# Patient Record
Sex: FEMALE | Race: WHITE | NOT HISPANIC OR LATINO | URBAN - METROPOLITAN AREA
[De-identification: names, ages, dates, MRNs, and addresses within clinical notes are randomized per-mention and may not be internally consistent; named-entity substitution may affect disease eponyms.]

---

## 2019-03-29 ENCOUNTER — INPATIENT (INPATIENT)
Facility: HOSPITAL | Age: 64
LOS: 0 days | Discharge: HOME | End: 2019-03-29
Attending: STUDENT IN AN ORGANIZED HEALTH CARE EDUCATION/TRAINING PROGRAM | Admitting: STUDENT IN AN ORGANIZED HEALTH CARE EDUCATION/TRAINING PROGRAM

## 2019-03-29 ENCOUNTER — TRANSCRIPTION ENCOUNTER (OUTPATIENT)
Age: 64
End: 2019-03-29

## 2019-03-29 VITALS
SYSTOLIC BLOOD PRESSURE: 152 MMHG | RESPIRATION RATE: 16 BRPM | TEMPERATURE: 97 F | HEART RATE: 60 BPM | DIASTOLIC BLOOD PRESSURE: 87 MMHG | OXYGEN SATURATION: 95 %

## 2019-03-29 VITALS
HEART RATE: 92 BPM | OXYGEN SATURATION: 100 % | SYSTOLIC BLOOD PRESSURE: 169 MMHG | RESPIRATION RATE: 20 BRPM | HEIGHT: 62 IN | WEIGHT: 132.06 LBS | TEMPERATURE: 96 F | DIASTOLIC BLOOD PRESSURE: 89 MMHG

## 2019-03-29 DIAGNOSIS — E87.6 HYPOKALEMIA: ICD-10-CM

## 2019-03-29 DIAGNOSIS — R07.9 CHEST PAIN, UNSPECIFIED: ICD-10-CM

## 2019-03-29 DIAGNOSIS — E78.00 PURE HYPERCHOLESTEROLEMIA, UNSPECIFIED: ICD-10-CM

## 2019-03-29 DIAGNOSIS — I10 ESSENTIAL (PRIMARY) HYPERTENSION: ICD-10-CM

## 2019-03-29 LAB
ALBUMIN SERPL ELPH-MCNC: 4.7 G/DL — SIGNIFICANT CHANGE UP (ref 3.5–5.2)
ALBUMIN SERPL ELPH-MCNC: 4.8 G/DL — SIGNIFICANT CHANGE UP (ref 3.5–5.2)
ALP SERPL-CCNC: 73 U/L — SIGNIFICANT CHANGE UP (ref 30–115)
ALP SERPL-CCNC: 77 U/L — SIGNIFICANT CHANGE UP (ref 30–115)
ALT FLD-CCNC: 11 U/L — SIGNIFICANT CHANGE UP (ref 0–41)
ALT FLD-CCNC: 11 U/L — SIGNIFICANT CHANGE UP (ref 0–41)
ANION GAP SERPL CALC-SCNC: 14 MMOL/L — SIGNIFICANT CHANGE UP (ref 7–14)
ANION GAP SERPL CALC-SCNC: 14 MMOL/L — SIGNIFICANT CHANGE UP (ref 7–14)
AST SERPL-CCNC: 20 U/L — SIGNIFICANT CHANGE UP (ref 0–41)
AST SERPL-CCNC: 21 U/L — SIGNIFICANT CHANGE UP (ref 0–41)
BILIRUB SERPL-MCNC: 0.4 MG/DL — SIGNIFICANT CHANGE UP (ref 0.2–1.2)
BILIRUB SERPL-MCNC: 0.4 MG/DL — SIGNIFICANT CHANGE UP (ref 0.2–1.2)
BUN SERPL-MCNC: 11 MG/DL — SIGNIFICANT CHANGE UP (ref 10–20)
BUN SERPL-MCNC: 8 MG/DL — LOW (ref 10–20)
CALCIUM SERPL-MCNC: 9.4 MG/DL — SIGNIFICANT CHANGE UP (ref 8.5–10.1)
CALCIUM SERPL-MCNC: 9.4 MG/DL — SIGNIFICANT CHANGE UP (ref 8.5–10.1)
CHLORIDE SERPL-SCNC: 104 MMOL/L — SIGNIFICANT CHANGE UP (ref 98–110)
CHLORIDE SERPL-SCNC: 105 MMOL/L — SIGNIFICANT CHANGE UP (ref 98–110)
CK MB CFR SERPL CALC: 1.8 NG/ML — SIGNIFICANT CHANGE UP (ref 0.6–6.3)
CK SERPL-CCNC: 113 U/L — SIGNIFICANT CHANGE UP (ref 0–225)
CO2 SERPL-SCNC: 23 MMOL/L — SIGNIFICANT CHANGE UP (ref 17–32)
CO2 SERPL-SCNC: 24 MMOL/L — SIGNIFICANT CHANGE UP (ref 17–32)
CREAT SERPL-MCNC: 0.5 MG/DL — LOW (ref 0.7–1.5)
CREAT SERPL-MCNC: 0.6 MG/DL — LOW (ref 0.7–1.5)
GLUCOSE SERPL-MCNC: 107 MG/DL — HIGH (ref 70–99)
GLUCOSE SERPL-MCNC: 113 MG/DL — HIGH (ref 70–99)
HCT VFR BLD CALC: 37 % — SIGNIFICANT CHANGE UP (ref 37–47)
HCT VFR BLD CALC: 38.6 % — SIGNIFICANT CHANGE UP (ref 37–47)
HCV AB S/CO SERPL IA: 0.54 S/CO — SIGNIFICANT CHANGE UP (ref 0–0.79)
HCV AB SERPL-IMP: SIGNIFICANT CHANGE UP
HGB BLD-MCNC: 12.3 G/DL — SIGNIFICANT CHANGE UP (ref 12–16)
HGB BLD-MCNC: 12.7 G/DL — SIGNIFICANT CHANGE UP (ref 12–16)
MCHC RBC-ENTMCNC: 29.1 PG — SIGNIFICANT CHANGE UP (ref 27–31)
MCHC RBC-ENTMCNC: 29.2 PG — SIGNIFICANT CHANGE UP (ref 27–31)
MCHC RBC-ENTMCNC: 32.9 G/DL — SIGNIFICANT CHANGE UP (ref 32–37)
MCHC RBC-ENTMCNC: 33.2 G/DL — SIGNIFICANT CHANGE UP (ref 32–37)
MCV RBC AUTO: 87.9 FL — SIGNIFICANT CHANGE UP (ref 81–99)
MCV RBC AUTO: 88.3 FL — SIGNIFICANT CHANGE UP (ref 81–99)
NRBC # BLD: 0 /100 WBCS — SIGNIFICANT CHANGE UP (ref 0–0)
NRBC # BLD: 0 /100 WBCS — SIGNIFICANT CHANGE UP (ref 0–0)
PLATELET # BLD AUTO: 214 K/UL — SIGNIFICANT CHANGE UP (ref 130–400)
PLATELET # BLD AUTO: 235 K/UL — SIGNIFICANT CHANGE UP (ref 130–400)
POTASSIUM SERPL-MCNC: 3.4 MMOL/L — LOW (ref 3.5–5)
POTASSIUM SERPL-MCNC: 3.9 MMOL/L — SIGNIFICANT CHANGE UP (ref 3.5–5)
POTASSIUM SERPL-SCNC: 3.4 MMOL/L — LOW (ref 3.5–5)
POTASSIUM SERPL-SCNC: 3.9 MMOL/L — SIGNIFICANT CHANGE UP (ref 3.5–5)
PROT SERPL-MCNC: 7.1 G/DL — SIGNIFICANT CHANGE UP (ref 6–8)
PROT SERPL-MCNC: 7.1 G/DL — SIGNIFICANT CHANGE UP (ref 6–8)
RBC # BLD: 4.21 M/UL — SIGNIFICANT CHANGE UP (ref 4.2–5.4)
RBC # BLD: 4.37 M/UL — SIGNIFICANT CHANGE UP (ref 4.2–5.4)
RBC # FLD: 14.5 % — SIGNIFICANT CHANGE UP (ref 11.5–14.5)
RBC # FLD: 14.6 % — HIGH (ref 11.5–14.5)
SODIUM SERPL-SCNC: 142 MMOL/L — SIGNIFICANT CHANGE UP (ref 135–146)
SODIUM SERPL-SCNC: 142 MMOL/L — SIGNIFICANT CHANGE UP (ref 135–146)
TROPONIN T SERPL-MCNC: <0.01 NG/ML — SIGNIFICANT CHANGE UP
TROPONIN T SERPL-MCNC: <0.01 NG/ML — SIGNIFICANT CHANGE UP
WBC # BLD: 6.57 K/UL — SIGNIFICANT CHANGE UP (ref 4.8–10.8)
WBC # BLD: 7.49 K/UL — SIGNIFICANT CHANGE UP (ref 4.8–10.8)
WBC # FLD AUTO: 6.57 K/UL — SIGNIFICANT CHANGE UP (ref 4.8–10.8)
WBC # FLD AUTO: 7.49 K/UL — SIGNIFICANT CHANGE UP (ref 4.8–10.8)

## 2019-03-29 RX ORDER — ACETAMINOPHEN 500 MG
2 TABLET ORAL
Qty: 0 | Refills: 0 | DISCHARGE
Start: 2019-03-29

## 2019-03-29 RX ORDER — AMLODIPINE BESYLATE 2.5 MG/1
5 TABLET ORAL DAILY
Qty: 0 | Refills: 0 | Status: DISCONTINUED | OUTPATIENT
Start: 2019-03-30 | End: 2019-03-29

## 2019-03-29 RX ORDER — ASPIRIN/CALCIUM CARB/MAGNESIUM 324 MG
81 TABLET ORAL DAILY
Qty: 0 | Refills: 0 | Status: DISCONTINUED | OUTPATIENT
Start: 2019-03-29 | End: 2019-03-29

## 2019-03-29 RX ORDER — ACETAMINOPHEN 500 MG
650 TABLET ORAL EVERY 6 HOURS
Qty: 0 | Refills: 0 | Status: DISCONTINUED | OUTPATIENT
Start: 2019-03-29 | End: 2019-03-29

## 2019-03-29 RX ORDER — CHLORHEXIDINE GLUCONATE 213 G/1000ML
1 SOLUTION TOPICAL
Qty: 0 | Refills: 0 | Status: DISCONTINUED | OUTPATIENT
Start: 2019-03-29 | End: 2019-03-29

## 2019-03-29 RX ORDER — NICOTINE POLACRILEX 2 MG
1 GUM BUCCAL DAILY
Qty: 0 | Refills: 0 | Status: DISCONTINUED | OUTPATIENT
Start: 2019-03-29 | End: 2019-03-29

## 2019-03-29 RX ORDER — NICOTINE POLACRILEX 2 MG
1 GUM BUCCAL
Qty: 0 | Refills: 0 | DISCHARGE
Start: 2019-03-29

## 2019-03-29 RX ORDER — ASPIRIN/CALCIUM CARB/MAGNESIUM 324 MG
1 TABLET ORAL
Qty: 0 | Refills: 0 | DISCHARGE
Start: 2019-03-29

## 2019-03-29 RX ADMIN — Medication 1 PATCH: at 10:39

## 2019-03-29 RX ADMIN — Medication 81 MILLIGRAM(S): at 03:17

## 2019-03-29 NOTE — H&P ADULT - PROBLEM SELECTOR PLAN 1
Also strongly suggestive of muscular skeletal disease, very concerned about risk factors. Serial cardiac enzymes and ask cardiology to see

## 2019-03-29 NOTE — DISCHARGE NOTE PROVIDER - CARE PROVIDER_API CALL
Micha Sharma (MD)  Internal Medicine  [address] [place] [address]  Phone: [phone]  Fax: [phone]  Follow Up Time:     Quirino Mehta)  Cardiovascular Disease; Interventional Cardiology  [address] [place] [address]  Phone: [phone]  Fax: [phone]  Follow Up Time:

## 2019-03-29 NOTE — H&P ADULT - NSHPLABSRESULTS_GEN_ALL_CORE
EKG -  to me nsr with possible junctional ST depression (ER called it normal EKG)                          12.3   7.49  )-----------( 214      ( 29 Mar 2019 01:30 )             37.0     03-29    142  |  104  |  11  ----------------------------<  113<H>  3.4<L>   |  24  |  0.6<L>    Ca    9.4      29 Mar 2019 01:30    TPro  7.1  /  Alb  4.8  /  TBili  0.4  /  DBili  x   /  AST  21  /  ALT  11  /  AlkPhos  73  03-29              Lactate Trend    CARDIAC MARKERS ( 29 Mar 2019 01:30 )  x     / <0.01 ng/mL / x     / x     / x          CAPILLARY BLOOD GLUCOSE    CXR per discussion with ER is normal

## 2019-03-29 NOTE — CONSULT NOTE ADULT - SUBJECTIVE AND OBJECTIVE BOX
CARDIOLOGY CONSULT NOTE     CHIEF COMPLAINT/REASON FOR CONSULT:    HPI:  65yo female presents to the ER with chest pains for 3 days. Location is left with travel to middle and also right sided with pain level reaching 6/10. Notes that she actively lifts weights for exercise. Family history includes mother with heart disease and patient does use tobacco (but told ER she is a former smoker) (29 Mar 2019 04:53)      PAST MEDICAL & SURGICAL HISTORY:  Hypertension  High blood cholesterol      Cardiac Risks:   [x ]HTN, [ ] DM, [x ] Smoking, [x ] FH,  [ ] Lipids        MEDICATIONS:  MEDICATIONS  (STANDING):  aspirin  chewable 81 milliGRAM(s) Oral daily  chlorhexidine 4% Liquid 1 Application(s) Topical <User Schedule>      FAMILY HISTORY:      SOCIAL HISTORY:      [ ] Marital status   Allergies    No Known Allergies          	    REVIEW OF SYSTEMS:  CONSTITUTIONAL: No fever, weight loss, or fatigue  EYES: No eye pain, visual disturbances, or discharge  ENMT:  No difficulty hearing, tinnitus, vertigo; No sinus or throat pain  NECK: No pain or stiffness  RESPIRATORY: No cough, wheezing, chills or hemoptysis; No Shortness of Breath  CARDIOVASCULAR: see above  GASTROINTESTINAL: No abdominal or epigastric pain. No nausea, vomiting, or hematemesis; No diarrhea or constipation. No melena or hematochezia.  GENITOURINARY: No dysuria, frequency, hematuria, or incontinence  NEUROLOGICAL: No headaches, memory loss, loss of strength, numbness, or tremors  SKIN: No itching, burning, rashes, or lesions   	    [ ] All others negative	  [ ] Unable to obtain    PHYSICAL EXAM:  T(C): 35.8 (03-29-19 @ 00:59), Max: 35.8 (03-29-19 @ 00:59)  HR: 92 (03-29-19 @ 00:59) (92 - 92)  BP: 169/89 (03-29-19 @ 00:59) (169/89 - 169/89)  RR: 20 (03-29-19 @ 00:59) (20 - 20)  SpO2: 100% (03-29-19 @ 00:59) (100% - 100%)  Wt(kg): --  I&O's Summary      Appearance: Normal	  Psychiatry: A & O x 3, Mood & affect appropriate  HEENT:   Normal oral mucosa, PERRL, EOMI	  Lymphatic: No lymphadenopathy  Cardiovascular: Normal S1 S2,RRR, No JVD, No murmurs  Respiratory: Lungs clear to auscultation	  Gastrointestinal:  Soft, Non-tender, + BS	  Skin: No rashes, No ecchymoses, No cyanosis	  Neurologic: Non-focal  Extremities: Normal range of motion, No clubbing, cyanosis or edema  Vascular: Peripheral pulses palpable 2+ bilaterally      ECG:  	nsr nsst    	  LABS:	 	    CARDIAC MARKERS:                                    12.3   7.49  )-----------( 214      ( 29 Mar 2019 01:30 )             37.0     03-29    142  |  104  |  11  ----------------------------<  113<H>  3.4<L>   |  24  |  0.6<L>    Ca    9.4      29 Mar 2019 01:30    TPro  7.1  /  Alb  4.8  /  TBili  0.4  /  DBili  x   /  AST  21  /  ALT  11  /  AlkPhos  73  03-29

## 2019-03-29 NOTE — CONSULT NOTE ADULT - ASSESSMENT
Patient 3 days moving chest pain. Not exertional. Pain worse if move sternum. Sternum tender palpation. Neg stress she claims aug. Would r/o mi. F/U Dr galarza
no

## 2019-03-29 NOTE — DISCHARGE NOTE NURSING/CASE MANAGEMENT/SOCIAL WORK - NSDCDPATPORTLINK_GEN_ALL_CORE
You can access the [facility] Patient Portal, offered by [facility], by registering with the following website: [email or web address]

## 2019-03-29 NOTE — ED ADULT NURSE NOTE - NSIMPLEMENTINTERV_GEN_ALL_ED
Implemented All Universal Safety Interventions:  [place] to call system. Call bell, personal items and telephone within reach. Instruct patient to call for assistance. Room bathroom lighting operational. Non-slip footwear when patient is off stretcher. Physically safe environment: no spills, clutter or unnecessary equipment. Stretcher in lowest position, wheels locked, appropriate side rails in place.

## 2019-03-29 NOTE — H&P ADULT - HISTORY OF PRESENT ILLNESS
65yo female presents to the ER with chest pains for 3 days. Location is left with travel to middle and also right sided. Notes that she actively lifts weights for exercise. Family history includes mother with heart disease and patient does use tobacco 63yo female presents to the ER with chest pains for 3 days. Location is left with travel to middle and also right sided with pain level reaching 6/10. Notes that she actively lifts weights for exercise. Family history includes mother with heart disease and patient does use tobacco 63yo female presents to the ER with chest pains for 3 days. Location is left with travel to middle and also right sided with pain level reaching 6/10. Notes that she actively lifts weights for exercise. Family history includes mother with heart disease and patient does use tobacco (but told ER she is a former smoker)

## 2019-03-29 NOTE — DISCHARGE NOTE PROVIDER - HOSPITAL COURSE
65yo female presents to the ER with chest pains for 3 days likely atypical after lifting heavy weights during exercise. Admitted to telemonitoring with 2 sets of CE being negative, and Normal Echo, ruled out ACS. Cardio evaluated and suggest it to be atypical chest pain likely musculoskeltal and fu with cardiology as OP.         patient clinically stable for discharge home today and advised to avid heavy weight lifting for a week.

## 2019-03-29 NOTE — DISCHARGE NOTE PROVIDER - NSDCCPCAREPLAN_GEN_ALL_CORE_FT
PRINCIPAL DISCHARGE DIAGNOSIS  Diagnosis: Chest pain  Assessment and Plan of Treatment: atypical chest pain. analgesic PRN. avoid heavy lifting. fu Cardio OP      SECONDARY DISCHARGE DIAGNOSES  Diagnosis: Hypokalemia  Assessment and Plan of Treatment: improved

## 2019-04-02 DIAGNOSIS — E87.6 HYPOKALEMIA: ICD-10-CM

## 2019-04-02 DIAGNOSIS — I10 ESSENTIAL (PRIMARY) HYPERTENSION: ICD-10-CM

## 2019-04-02 DIAGNOSIS — R07.89 OTHER CHEST PAIN: ICD-10-CM

## 2019-04-02 DIAGNOSIS — R07.9 CHEST PAIN, UNSPECIFIED: ICD-10-CM

## 2019-04-02 DIAGNOSIS — E78.00 PURE HYPERCHOLESTEROLEMIA, UNSPECIFIED: ICD-10-CM

## 2019-06-04 NOTE — ED ADULT TRIAGE NOTE - BP NONINVASIVE SYSTOLIC (MM HG)
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

## 2021-04-20 NOTE — ED PROVIDER NOTE - RESPIRATORY NEGATIVE STATEMENT, MLM
Called patient to schedule a screening mammogram PATIENTPHONEMESSAGE: left message.-     Additional information      no shortness of breath.

## 2021-05-07 NOTE — PATIENT PROFILE ADULT - DO YOU FEEL UNSAFE AT WORK?
Telephone call was made to patient Marcelle Solorzano at the request of Dr. Jenn Lee regarding visit to the OB ED last evening.  Dr. Lee determined that the patient's urinalysis indicated probable urinary tract infection and recommended that patient be started on Keflex 500 mg 4 times daily for 7 days.    Patient had knowledge that she had no known medication allergies.  She requested that prescription be sent to the [facility]'s pharmacy in [facility].  
no

## 2021-07-30 PROBLEM — I10 ESSENTIAL (PRIMARY) HYPERTENSION: Chronic | Status: ACTIVE | Noted: 2019-03-29

## 2021-07-30 PROBLEM — E78.00 PURE HYPERCHOLESTEROLEMIA, UNSPECIFIED: Chronic | Status: ACTIVE | Noted: 2019-03-29

## 2021-11-10 PROBLEM — Z00.00 ENCOUNTER FOR PREVENTIVE HEALTH EXAMINATION: Status: ACTIVE | Noted: 2021-11-10

## 2021-11-16 ENCOUNTER — APPOINTMENT (OUTPATIENT)
Dept: GASTROENTEROLOGY | Facility: CLINIC | Age: 66
End: 2021-11-16
Payer: MEDICARE

## 2021-11-16 DIAGNOSIS — Z86.79 PERSONAL HISTORY OF OTHER DISEASES OF THE CIRCULATORY SYSTEM: ICD-10-CM

## 2021-11-16 DIAGNOSIS — Z86.69 PERSONAL HISTORY OF OTHER DISEASES OF THE NERVOUS SYSTEM AND SENSE ORGANS: ICD-10-CM

## 2021-11-16 DIAGNOSIS — Z12.11 ENCOUNTER FOR SCREENING FOR MALIGNANT NEOPLASM OF COLON: ICD-10-CM

## 2021-11-16 DIAGNOSIS — Z80.8 FAMILY HISTORY OF MALIGNANT NEOPLASM OF OTHER ORGANS OR SYSTEMS: ICD-10-CM

## 2021-11-16 DIAGNOSIS — Z78.9 OTHER SPECIFIED HEALTH STATUS: ICD-10-CM

## 2021-11-16 DIAGNOSIS — Z72.0 TOBACCO USE: ICD-10-CM

## 2021-11-16 PROCEDURE — 99204 OFFICE O/P NEW MOD 45 MIN: CPT | Mod: 95

## 2021-11-16 RX ORDER — DULOXETINE HYDROCHLORIDE 20 MG/1
20 CAPSULE, DELAYED RELEASE ORAL
Refills: 0 | Status: ACTIVE | COMMUNITY

## 2021-11-16 RX ORDER — AMLODIPINE BESYLATE 5 MG/1
5 TABLET ORAL
Refills: 0 | Status: ACTIVE | COMMUNITY

## 2021-11-16 RX ORDER — ROSUVASTATIN CALCIUM 20 MG/1
20 TABLET, FILM COATED ORAL
Refills: 0 | Status: ACTIVE | COMMUNITY

## 2021-11-16 NOTE — ASSESSMENT
[FreeTextEntry1] : 66 year old female patient with family hx of CRC (uncle), never had colonoscopies, ex smoker, anxiety/depression, presents for screening colonoscopy. \par pt denies any blood in stools, abdominal pain, change in bowel habits, or weight loss. \par no UGI sx or dysphagia. \par \par \par Needs screening colonoscopy\par Risks and benefits discussed with patient.\par \par \par

## 2021-11-16 NOTE — PHYSICAL EXAM
[General Appearance - Alert] : alert [Hearing Threshold Finger Rub Not Wetzel] : hearing was normal [] : no respiratory distress [Skin Color & Pigmentation] : normal skin color and pigmentation [Oriented To Time, Place, And Person] : oriented to person, place, and time

## 2021-11-16 NOTE — HISTORY OF PRESENT ILLNESS
[Home] : at home, [unfilled] , at the time of the visit. [Medical Office: ([facility])___] : at the medical office located in  [Verbal consent obtained from patient] : the patient, [unfilled] [FreeTextEntry4] : Janneth Tam [de-identified] : 66 year old female patient with family hx of CRC (uncle), never had colonoscopies, ex smoker, anxiety/depression, presents for screening colonoscopy. \par pt denies any blood in stools, abdominal pain, change in bowel habits, or weight loss. \par no UGI sx or dysphagia. \par

## 2022-03-15 NOTE — ED PROVIDER NOTE - OBJECTIVE STATEMENT
no 65 y/o F smoker presents w left sided intermittent, 6/10 non-radiating cp after working out in the gym today. No back pain. No n/v. No alleviating factors. Not reproducible.

## 2022-08-09 ENCOUNTER — LABORATORY RESULT (OUTPATIENT)
Age: 67
End: 2022-08-09

## 2022-08-12 ENCOUNTER — OUTPATIENT (OUTPATIENT)
Dept: OUTPATIENT SERVICES | Facility: HOSPITAL | Age: 67
LOS: 1 days | Discharge: HOME | End: 2022-08-12

## 2022-08-12 ENCOUNTER — TRANSCRIPTION ENCOUNTER (OUTPATIENT)
Age: 67
End: 2022-08-12

## 2022-08-12 ENCOUNTER — RESULT REVIEW (OUTPATIENT)
Age: 67
End: 2022-08-12

## 2022-08-12 VITALS
SYSTOLIC BLOOD PRESSURE: 138 MMHG | TEMPERATURE: 97 F | HEIGHT: 62 IN | WEIGHT: 128.09 LBS | HEART RATE: 73 BPM | DIASTOLIC BLOOD PRESSURE: 74 MMHG | RESPIRATION RATE: 18 BRPM

## 2022-08-12 VITALS
SYSTOLIC BLOOD PRESSURE: 109 MMHG | HEART RATE: 59 BPM | RESPIRATION RATE: 25 BRPM | DIASTOLIC BLOOD PRESSURE: 60 MMHG | OXYGEN SATURATION: 100 % | TEMPERATURE: 97 F

## 2022-08-12 PROCEDURE — 45385 COLONOSCOPY W/LESION REMOVAL: CPT

## 2022-08-12 PROCEDURE — 88305 TISSUE EXAM BY PATHOLOGIST: CPT | Mod: 26

## 2022-08-12 RX ORDER — ROSUVASTATIN CALCIUM 5 MG/1
1 TABLET ORAL
Qty: 0 | Refills: 0 | DISCHARGE

## 2022-08-12 NOTE — ASU DISCHARGE PLAN (ADULT/PEDIATRIC) - NS MD DC FALL RISK RISK
For information on Fall & Injury Prevention, visit: [email or web address] OR  [email or web address] OR  https://www.cdc.gov/steadi/patient.html

## 2022-08-12 NOTE — ASU PATIENT PROFILE, ADULT - NSICDXPASTMEDICALHX_GEN_ALL_CORE_FT
PAST MEDICAL HISTORY:  Anxiety disorder panic attacks    High blood cholesterol     Hypertension     Sleep apnea

## 2022-08-12 NOTE — CHART NOTE - NSCHARTNOTEFT_GEN_A_CORE
PACU ANESTHESIA PACU ADMISSION NOTE      Procedure:  Post op diagnosis    ____ Intubated  TV:______       Rate: ______      FiO2: ______    __x__ Patent Airway    ____ Full return of protective reflexes    ____ Full recovery from anesthesia / sedation to baseline status    Viitals:  see anesthesia record          Mental Status:  __x__ Awake   _____ Alert   _____ Drowsy   _____ Sedated    Nausea/Vomiting: ____ Yes, See Post - Op Orders      ___x_ No    Pain Scale (0-10): _____    Treatment: ____ None    _x___ See Post - Op/PCA Orders    Post - Operative Fluids:   ____ Oral   _x___ See Post - Op Orders    Plan:         Discharge:   x____Home       _____Floor         _____Critical Care    _____Other:_________________    Comments: uneventful perioperative course; no s/s of anesthesia complications noted; D/C home when criteria met

## 2022-08-12 NOTE — ASU PATIENT PROFILE, ADULT - FALL HARM RISK - UNIVERSAL INTERVENTIONS
Bed in lowest position, wheels locked, appropriate side rails in place/Call bell, personal items and telephone in reach/Instruct patient to call for assistance before getting out of bed or chair/Non-slip footwear when patient is out of bed/[place] to call system/Physically safe environment - no spills, clutter or unnecessary equipment/Purposeful Proactive Rounding/Room/bathroom lighting operational, light cord in reach

## 2022-08-16 DIAGNOSIS — E78.00 PURE HYPERCHOLESTEROLEMIA, UNSPECIFIED: ICD-10-CM

## 2022-08-16 DIAGNOSIS — I10 ESSENTIAL (PRIMARY) HYPERTENSION: ICD-10-CM

## 2022-08-16 DIAGNOSIS — Z79.82 LONG TERM (CURRENT) USE OF ASPIRIN: ICD-10-CM

## 2022-08-16 DIAGNOSIS — K64.4 RESIDUAL HEMORRHOIDAL SKIN TAGS: ICD-10-CM

## 2022-08-16 DIAGNOSIS — R10.9 UNSPECIFIED ABDOMINAL PAIN: ICD-10-CM

## 2022-08-16 DIAGNOSIS — Z80.0 FAMILY HISTORY OF MALIGNANT NEOPLASM OF DIGESTIVE ORGANS: ICD-10-CM

## 2022-08-16 DIAGNOSIS — D12.2 BENIGN NEOPLASM OF ASCENDING COLON: ICD-10-CM

## 2022-08-16 DIAGNOSIS — Z99.89 DEPENDENCE ON OTHER ENABLING MACHINES AND DEVICES: ICD-10-CM

## 2022-08-16 DIAGNOSIS — G47.33 OBSTRUCTIVE SLEEP APNEA (ADULT) (PEDIATRIC): ICD-10-CM

## 2022-08-16 LAB — SURGICAL PATHOLOGY STUDY: SIGNIFICANT CHANGE UP

## 2022-09-01 NOTE — ASU PATIENT PROFILE, ADULT - VISION (WITH CORRECTIVE LENSES IF THE PATIENT USUALLY WEARS THEM):
Pt resting in bed and conversing on phone. Agreeable with POC for stone removal in OR. Denies needs at this time. Call light within reach.    Normal vision: sees adequately in most situations; can see medication labels, newsprint

## 2023-03-04 ENCOUNTER — EMERGENCY (EMERGENCY)
Facility: HOSPITAL | Age: 68
LOS: 0 days | Discharge: HOME | End: 2023-03-04
Attending: EMERGENCY MEDICINE
Payer: MEDICARE

## 2023-03-04 VITALS
WEIGHT: 145.06 LBS | HEIGHT: 66 IN | SYSTOLIC BLOOD PRESSURE: 146 MMHG | TEMPERATURE: 97 F | OXYGEN SATURATION: 99 % | DIASTOLIC BLOOD PRESSURE: 87 MMHG | RESPIRATION RATE: 18 BRPM

## 2023-03-04 VITALS
OXYGEN SATURATION: 98 % | DIASTOLIC BLOOD PRESSURE: 73 MMHG | SYSTOLIC BLOOD PRESSURE: 128 MMHG | TEMPERATURE: 97 F | HEART RATE: 74 BPM

## 2023-03-04 DIAGNOSIS — N39.0 URINARY TRACT INFECTION, SITE NOT SPECIFIED: ICD-10-CM

## 2023-03-04 DIAGNOSIS — I10 ESSENTIAL (PRIMARY) HYPERTENSION: ICD-10-CM

## 2023-03-04 DIAGNOSIS — R33.9 RETENTION OF URINE, UNSPECIFIED: ICD-10-CM

## 2023-03-04 DIAGNOSIS — Z98.890 OTHER SPECIFIED POSTPROCEDURAL STATES: ICD-10-CM

## 2023-03-04 DIAGNOSIS — E78.5 HYPERLIPIDEMIA, UNSPECIFIED: ICD-10-CM

## 2023-03-04 DIAGNOSIS — R30.0 DYSURIA: ICD-10-CM

## 2023-03-04 PROBLEM — F41.9 ANXIETY DISORDER, UNSPECIFIED: Chronic | Status: ACTIVE | Noted: 2022-08-12

## 2023-03-04 LAB
ALBUMIN SERPL ELPH-MCNC: 4.6 G/DL — SIGNIFICANT CHANGE UP (ref 3.5–5.2)
ALP SERPL-CCNC: 93 U/L — SIGNIFICANT CHANGE UP (ref 30–115)
ALT FLD-CCNC: 13 U/L — SIGNIFICANT CHANGE UP (ref 0–41)
ANION GAP SERPL CALC-SCNC: 15 MMOL/L — HIGH (ref 7–14)
APPEARANCE UR: ABNORMAL
AST SERPL-CCNC: 17 U/L — SIGNIFICANT CHANGE UP (ref 0–41)
BASOPHILS # BLD AUTO: 0.06 K/UL — SIGNIFICANT CHANGE UP (ref 0–0.2)
BASOPHILS NFR BLD AUTO: 0.4 % — SIGNIFICANT CHANGE UP (ref 0–1)
BILIRUB SERPL-MCNC: 0.2 MG/DL — SIGNIFICANT CHANGE UP (ref 0.2–1.2)
BILIRUB UR-MCNC: NEGATIVE — SIGNIFICANT CHANGE UP
BUN SERPL-MCNC: 13 MG/DL — SIGNIFICANT CHANGE UP (ref 10–20)
CALCIUM SERPL-MCNC: 9.1 MG/DL — SIGNIFICANT CHANGE UP (ref 8.4–10.5)
CHLORIDE SERPL-SCNC: 100 MMOL/L — SIGNIFICANT CHANGE UP (ref 98–110)
CO2 SERPL-SCNC: 24 MMOL/L — SIGNIFICANT CHANGE UP (ref 17–32)
COLOR SPEC: ABNORMAL
CREAT SERPL-MCNC: 0.6 MG/DL — LOW (ref 0.7–1.5)
DIFF PNL FLD: ABNORMAL
EGFR: 96 ML/MIN/1.73M2 — SIGNIFICANT CHANGE UP
EOSINOPHIL # BLD AUTO: 0.07 K/UL — SIGNIFICANT CHANGE UP (ref 0–0.7)
EOSINOPHIL NFR BLD AUTO: 0.5 % — SIGNIFICANT CHANGE UP (ref 0–8)
GLUCOSE SERPL-MCNC: 135 MG/DL — HIGH (ref 70–99)
GLUCOSE UR QL: NEGATIVE — SIGNIFICANT CHANGE UP
HCT VFR BLD CALC: 36.5 % — LOW (ref 37–47)
HGB BLD-MCNC: 11.9 G/DL — LOW (ref 12–16)
IMM GRANULOCYTES NFR BLD AUTO: 0.3 % — SIGNIFICANT CHANGE UP (ref 0.1–0.3)
KETONES UR-MCNC: NEGATIVE — SIGNIFICANT CHANGE UP
LEUKOCYTE ESTERASE UR-ACNC: ABNORMAL
LYMPHOCYTES # BLD AUTO: 1.42 K/UL — SIGNIFICANT CHANGE UP (ref 1.2–3.4)
LYMPHOCYTES # BLD AUTO: 9.5 % — LOW (ref 20.5–51.1)
MCHC RBC-ENTMCNC: 28.2 PG — SIGNIFICANT CHANGE UP (ref 27–31)
MCHC RBC-ENTMCNC: 32.6 G/DL — SIGNIFICANT CHANGE UP (ref 32–37)
MCV RBC AUTO: 86.5 FL — SIGNIFICANT CHANGE UP (ref 81–99)
MONOCYTES # BLD AUTO: 0.81 K/UL — HIGH (ref 0.1–0.6)
MONOCYTES NFR BLD AUTO: 5.4 % — SIGNIFICANT CHANGE UP (ref 1.7–9.3)
NEUTROPHILS # BLD AUTO: 12.58 K/UL — HIGH (ref 1.4–6.5)
NEUTROPHILS NFR BLD AUTO: 83.9 % — HIGH (ref 42.2–75.2)
NITRITE UR-MCNC: NEGATIVE — SIGNIFICANT CHANGE UP
NRBC # BLD: 0 /100 WBCS — SIGNIFICANT CHANGE UP (ref 0–0)
PH UR: 7 — SIGNIFICANT CHANGE UP (ref 5–8)
PLATELET # BLD AUTO: 308 K/UL — SIGNIFICANT CHANGE UP (ref 130–400)
POTASSIUM SERPL-MCNC: 3.4 MMOL/L — LOW (ref 3.5–5)
POTASSIUM SERPL-SCNC: 3.4 MMOL/L — LOW (ref 3.5–5)
PROT SERPL-MCNC: 7.2 G/DL — SIGNIFICANT CHANGE UP (ref 6–8)
PROT UR-MCNC: 300 MG/DL — SIGNIFICANT CHANGE UP
RBC # BLD: 4.22 M/UL — SIGNIFICANT CHANGE UP (ref 4.2–5.4)
RBC # FLD: 13.5 % — SIGNIFICANT CHANGE UP (ref 11.5–14.5)
SODIUM SERPL-SCNC: 139 MMOL/L — SIGNIFICANT CHANGE UP (ref 135–146)
SP GR SPEC: 1.02 — SIGNIFICANT CHANGE UP (ref 1.01–1.03)
UROBILINOGEN FLD QL: 0.2 — SIGNIFICANT CHANGE UP
WBC # BLD: 14.99 K/UL — HIGH (ref 4.8–10.8)
WBC # FLD AUTO: 14.99 K/UL — HIGH (ref 4.8–10.8)

## 2023-03-04 PROCEDURE — 80053 COMPREHEN METABOLIC PANEL: CPT

## 2023-03-04 PROCEDURE — 96374 THER/PROPH/DIAG INJ IV PUSH: CPT

## 2023-03-04 PROCEDURE — 81001 URINALYSIS AUTO W/SCOPE: CPT

## 2023-03-04 PROCEDURE — 99284 EMERGENCY DEPT VISIT MOD MDM: CPT | Mod: 25

## 2023-03-04 PROCEDURE — 76775 US EXAM ABDO BACK WALL LIM: CPT

## 2023-03-04 PROCEDURE — 85025 COMPLETE CBC W/AUTO DIFF WBC: CPT

## 2023-03-04 PROCEDURE — 51702 INSERT TEMP BLADDER CATH: CPT | Mod: XU

## 2023-03-04 PROCEDURE — 36415 COLL VENOUS BLD VENIPUNCTURE: CPT

## 2023-03-04 PROCEDURE — 76705 ECHO EXAM OF ABDOMEN: CPT | Mod: 26

## 2023-03-04 PROCEDURE — 87086 URINE CULTURE/COLONY COUNT: CPT

## 2023-03-04 RX ORDER — CEFTRIAXONE 500 MG/1
1000 INJECTION, POWDER, FOR SOLUTION INTRAMUSCULAR; INTRAVENOUS ONCE
Refills: 0 | Status: COMPLETED | OUTPATIENT
Start: 2023-03-04 | End: 2023-03-04

## 2023-03-04 RX ORDER — CEFPODOXIME PROXETIL 100 MG
1 TABLET ORAL
Qty: 20 | Refills: 0
Start: 2023-03-04 | End: 2023-03-13

## 2023-03-04 RX ADMIN — CEFTRIAXONE 100 MILLIGRAM(S): 500 INJECTION, POWDER, FOR SOLUTION INTRAMUSCULAR; INTRAVENOUS at 02:21

## 2023-03-04 NOTE — ED PROVIDER NOTE - PROGRESS NOTE DETAILS
Dubose cath placed and bloody noticed in the dubose bag. Labs unremarkable. Will keep the dubose in and have  the pt follow up with PCP and urologist OP. Pt is stable for discharge.

## 2023-03-04 NOTE — ED PROVIDER NOTE - NSFOLLOWUPINSTRUCTIONS_ED_ALL_ED_FT
Please make sure to follow up with your primary care doctor in 3 days.    Please make sure to follow up with your own urologist in 3 days.    Our Emergency Department Referral Coordinators will be reaching out ot you in the next 24-48 hours from 9:00am to 5:00pm (Monday to Friday) with a follow up appointment. Please expect a phone call from the hospital in that time frame. If you do not receive a call or if you have any questions or concerns, you can reach them at [phone] or [phone].      Urinary Tract Infection    A urinary tract infection (UTI) is an infection of any part of the urinary tract, which includes the kidneys, ureters, bladder, and urethra. Risk factors include ignoring your need to urinate, wiping back to front if female, being an uncircumcised male, and having diabetes or a weak immune system. Symptoms include frequent urination, pain or burning with urination, foul smelling urine, cloudy urine, pain in the lower abdomen, blood in the urine, and fever. If you were prescribed an antibiotic medicine, take it as told by your health care provider. Do not stop taking the antibiotic even if you start to feel better.    SEEK IMMEDIATE MEDICAL CARE IF YOU HAVE THE FOLLOWING SYMPTOMS: severe back or abdominal pain, inability to keep fluids or medicine down, dizziness/lightheadedness, or a change in mental status.

## 2023-03-04 NOTE — ED PROVIDER NOTE - ATTENDING APP SHARED VISIT CONTRIBUTION OF CARE
68-year-old female above PMH no blood thinner or antiplatelet use complains of inability to void since 9 PM with lower abdominal discomfort, urine had been clear up until around 9 PM when she noted blood, no dysuria, no fever, no vomiting or diarrhea, was seen by GYN last month for dyspareunia and vaginal burning, diagnosed with atrophic vaginitis, did have pelvic ultrasound as well as endometrial biopsy both of which were normal per patient, on exam vitals appreciated, nontoxic, abdomen soft with suprapubic distention and TTP no G/R, POCUS with retention, will place Farrell

## 2023-03-04 NOTE — ED PROVIDER NOTE - CLINICAL SUMMARY MEDICAL DECISION MAKING FREE TEXT BOX
Patient in urinary retention with blood-tinged urine without clot, labs and UA appreciated, will discharge with leg bag and antibiotics to follow-up with urology for further evaluation.  Patient counseled regarding conditions which should prompt return.

## 2023-03-04 NOTE — ED PROVIDER NOTE - PHYSICAL EXAMINATION
CONSTITUTIONAL: in no apparent distress.   HEAD: Normocephalic; atraumatic.   EYES: Pupils are round and reactive, extra-ocular muscles are intact. Eyelids are normal in appearance without swelling or lesions.   ENT: Hearing is intact with good acuity to spoken voice.  Patient is speaking clearly, not muffled and airway is intact.   RESPIRATORY: No signs of respiratory distress. Lung sounds are clear in all lobes bilaterally without rales, rhonchi, or wheezes.  CARDIOVASCULAR: Regular rate and rhythm.   GI: tender to palpation in the suprapubic area. Abdomen is soft, and without distention. Bowel sounds are present and normoactive in all four quadrants. No masses are noted.   BACK: No evidence of trauma or deformity. No CVA tenderness bilaterally. Normal ROM.   NEURO: A & O x 3. Normal speech. No focal deficit.  PSYCHOLOGICAL: Appropriate mood and affect. Good judgement and insight.

## 2023-03-04 NOTE — ED ADULT TRIAGE NOTE - MODE OF ARRIVAL
Wound Team folloing up on pressure injury to left anterior neck. It has resolved. Aspen collar pads are soiled and they are not carried her so left message for ortho technician at [facility] to see if some pads can be obtained from them.   [employer] Walk in

## 2023-03-04 NOTE — ED PROVIDER NOTE - OBJECTIVE STATEMENT
68-year-old female with past medical history of hypertension and hyperlipidemia who presents with urinary retention.  Reports that she had 1 episode of urination around 9:00 PM last night where she noticed blood in her urine.  Since then, patient has not been able to urinate although with the urgency of urination.  Reports that she had biopsy of her uterus on 2/14/2023, but has been doing well since the procedure.  Also endorses dysuria.  Denies fever, shortness of, chest pain, nausea, vomiting, abdominal pain, and change in bowel movement.

## 2023-03-04 NOTE — ED PROVIDER NOTE - PATIENT PORTAL LINK FT
You can access the FollowMyHealth Patient Portal offered by [facility] by registering at the following website: [email or web address]. By joining [employer]’s FollowMyHealth portal, you will also be able to view your health information using other applications (apps) compatible with our system.

## 2023-03-05 ENCOUNTER — EMERGENCY (EMERGENCY)
Facility: HOSPITAL | Age: 68
LOS: 0 days | Discharge: HOME | End: 2023-03-05
Attending: EMERGENCY MEDICINE
Payer: MEDICARE

## 2023-03-05 VITALS
TEMPERATURE: 97 F | DIASTOLIC BLOOD PRESSURE: 82 MMHG | SYSTOLIC BLOOD PRESSURE: 137 MMHG | HEIGHT: 62 IN | WEIGHT: 130.07 LBS | OXYGEN SATURATION: 98 % | HEART RATE: 74 BPM | RESPIRATION RATE: 17 BRPM

## 2023-03-05 DIAGNOSIS — R33.9 RETENTION OF URINE, UNSPECIFIED: ICD-10-CM

## 2023-03-05 DIAGNOSIS — E78.00 PURE HYPERCHOLESTEROLEMIA, UNSPECIFIED: ICD-10-CM

## 2023-03-05 DIAGNOSIS — F41.0 PANIC DISORDER [EPISODIC PAROXYSMAL ANXIETY]: ICD-10-CM

## 2023-03-05 DIAGNOSIS — N30.11 INTERSTITIAL CYSTITIS (CHRONIC) WITH HEMATURIA: ICD-10-CM

## 2023-03-05 DIAGNOSIS — I10 ESSENTIAL (PRIMARY) HYPERTENSION: ICD-10-CM

## 2023-03-05 LAB
CULTURE RESULTS: SIGNIFICANT CHANGE UP
SPECIMEN SOURCE: SIGNIFICANT CHANGE UP

## 2023-03-05 PROCEDURE — 99284 EMERGENCY DEPT VISIT MOD MDM: CPT

## 2023-03-05 PROCEDURE — 99282 EMERGENCY DEPT VISIT SF MDM: CPT

## 2023-03-05 NOTE — ED PROVIDER NOTE - PATIENT PORTAL LINK FT
You can access the FollowMyHealth Patient Portal offered by [facility] by registering at the following website: [email or web address]. By joining [employer]’s FollowMyHealth portal, you will also be able to view your health information using other applications (apps) compatible with our system.

## 2023-03-05 NOTE — ED PROVIDER NOTE - PHYSICAL EXAMINATION
Vital Signs: I have reviewed the initial vital signs.  Constitutional: well-nourished, no acute distress  Eyes: PERRLA, EOMI,  clear conjunctiva  Cardiovascular: regular rate, regular rhythm, no murmur appreciated  Respiratory: unlabored respiratory effort, clear to auscultation bilaterally  Gastrointestinal: soft, non-tender, non-distended  abdomen, no cva tenderness  : dubose in good position, confirmed placement with bedside ultrasound   Integumentary: warm, dry, no rash  Neurologic: awake, alert, cranial nerves II-XII grossly intact, extremities’ motor and sensory functions grossly intact, no focal deficits

## 2023-03-05 NOTE — ED PROVIDER NOTE - NSFOLLOWUPINSTRUCTIONS_ED_ALL_ED_FT
Our Emergency Department Referral Coordinators will be reaching out to you in the next 24-48 hours from 9:00am to 5:00pm with a follow up appointment. Please expect a phone call from the hospital in that time frame. If you do not receive a call or if you have any questions or concerns, you can reach them at   [phone]        Urinary Tract Infection    A urinary tract infection (UTI) is an infection of any part of the urinary tract, which includes the kidneys, ureters, bladder, and urethra. Risk factors include ignoring your need to urinate, wiping back to front if female, being an uncircumcised male, and having diabetes or a weak immune system. Symptoms include frequent urination, pain or burning with urination, foul smelling urine, cloudy urine, pain in the lower abdomen, blood in the urine, and fever. If you were prescribed an antibiotic medicine, take it as told by your health care provider. Do not stop taking the antibiotic even if you start to feel better.    SEEK IMMEDIATE MEDICAL CARE IF YOU HAVE ANY OF THE FOLLOWING SYMPTOMS: severe back or abdominal pain, fever, inability to keep fluids or medicine down, dizziness/lightheadedness, or a change in mental status.

## 2023-03-05 NOTE — ED PROVIDER NOTE - CARE PROVIDER_API CALL
Pacheco Rosario)  Urology  [address] [place] [address], [address]  [place] [address]  Phone: [phone]  Fax: [phone]  Follow Up Time:

## 2023-03-05 NOTE — ED PROVIDER NOTE - OBJECTIVE STATEMENT
69 y/o female presents to the ED for urinary retention. patient s/p urinary retention dx with UTI on Friday , awaiting results of culture. patient currently on antibx patient s/p endometrial bx 2 weeks prior. patient denies any fevers, back pain. patient with gross hematuria. patient has upcoming urology appt

## 2023-03-05 NOTE — ED PROVIDER NOTE - NS ED ROS FT
Review of Systems    Constitutional: (-) fever or chills  respiratory: (-) cough (-) shortness of breath  Cardiovascular: (-) syncope, palpitations or chest pain  GI: (-) no abdominal pain, vomiting or diarrhea  Integumentary: (-) rash or painful lymph nodes  msk: no joint pain or painful ROM  : hematuria with retention  Neurological: (-) headache or head injury

## 2023-03-05 NOTE — ED PROVIDER NOTE - CLINICAL SUMMARY MEDICAL DECISION MAKING FREE TEXT BOX
Patient required flushing of Farrell.  Now improved.  Stable for discharge and outpatient follow up.

## 2023-03-05 NOTE — ED PROVIDER NOTE - PROGRESS NOTE DETAILS
still awaiting culture results. bedside dubose irrigation with small clots and light blood colored urine. patient without any abdominal pain. dubose in good position confirmed by ultrasound

## 2023-03-07 ENCOUNTER — APPOINTMENT (OUTPATIENT)
Dept: UROLOGY | Facility: CLINIC | Age: 68
End: 2023-03-07
Payer: MEDICARE

## 2023-03-07 VITALS
OXYGEN SATURATION: 99 % | BODY MASS INDEX: 24.8 KG/M2 | WEIGHT: 140 LBS | DIASTOLIC BLOOD PRESSURE: 80 MMHG | HEIGHT: 63 IN | HEART RATE: 65 BPM | SYSTOLIC BLOOD PRESSURE: 145 MMHG | RESPIRATION RATE: 17 BRPM | TEMPERATURE: 97.5 F

## 2023-03-07 PROCEDURE — 99204 OFFICE O/P NEW MOD 45 MIN: CPT

## 2023-03-07 NOTE — PHYSICAL EXAM
[General Appearance - Well Developed] : well developed [General Appearance - Well Nourished] : well nourished [Normal Appearance] : normal appearance [Well Groomed] : well groomed [General Appearance - In No Acute Distress] : no acute distress [Edema] : no peripheral edema [Respiration, Rhythm And Depth] : normal respiratory rhythm and effort [Exaggerated Use Of Accessory Muscles For Inspiration] : no accessory muscle use [Normal Station and Gait] : the gait and station were normal for the patient's age [] : no rash [Oriented To Time, Place, And Person] : oriented to person, place, and time [Affect] : the affect was normal [Mood] : the mood was normal [Not Anxious] : not anxious [FreeTextEntry1] : 16 Fr Farrell catheter in place and drain, old, tea color urine with some clots.

## 2023-03-07 NOTE — ASSESSMENT
[FreeTextEntry1] : \par \par Impression/plan: 68 year-old female with hx of kidney stones presents to the office for hematuria and urinary retention. \par \par 1. CBC, BMP\par 2. Urine cytology: gross heme w/u\par 3 CT urogram- STAT\par 4. F/u cystoscopy: gross heme w/u\par 5. Would keep Farrell for now until imaging elucidates picture.

## 2023-03-07 NOTE — HISTORY OF PRESENT ILLNESS
[FreeTextEntry1] : 68 year-old female with a hx of kidney stones, presents to the office s/p [place] ER visit over the past weekend. She first noticed some blood on Friday night and went to the ER on Saturday for urinary retention. Farrell catheter was placed. PVR 1100 ml. No imaging was done in the ER. Patient was started on Cefpodoxime 200 mg BID for 10 days. She does reports clots and the urine is starting to clear up. Denies fevers, but did have chills. \par Active smoker. Reports that she has cut down to 4 cigarettes a day over the past year. She has had kidney stones in the past, saw Dr. Rubin had started ER on medication to "break up the stones". Hematuria w/u in 2018 was negative with Dr. Rubin included cysto and CT Urogram. \par \par

## 2023-03-07 NOTE — LETTER BODY
[Dear  ___] : Dear  [unfilled], [Consult Letter:] : I had the pleasure of evaluating your patient, [unfilled]. [Please see my note below.] : Please see my note below. [Consult Closing:] : Thank you very much for allowing me to participate in the care of this patient.  If you have any questions, please do not hesitate to contact me. [Sincerely,] : Sincerely, [FreeTextEntry3] : Joe Patel MD\par System Director Urogynecology/[facility]\par Department of Urology\par [facility] \par   at [facility]\par  of Urology\par [employer] [facility] [employer] at [facility]\par

## 2023-03-08 ENCOUNTER — OUTPATIENT (OUTPATIENT)
Dept: OUTPATIENT SERVICES | Facility: HOSPITAL | Age: 68
LOS: 1 days | End: 2023-03-08
Payer: MEDICARE

## 2023-03-08 ENCOUNTER — NON-APPOINTMENT (OUTPATIENT)
Age: 68
End: 2023-03-08

## 2023-03-08 ENCOUNTER — APPOINTMENT (OUTPATIENT)
Dept: CT IMAGING | Facility: HOSPITAL | Age: 68
End: 2023-03-08

## 2023-03-08 LAB — URINE CYTOLOGY: NORMAL

## 2023-03-08 PROCEDURE — 74178 CT ABD&PLV WO CNTR FLWD CNTR: CPT

## 2023-03-08 PROCEDURE — 82565 ASSAY OF CREATININE: CPT

## 2023-03-08 PROCEDURE — 74178 CT ABD&PLV WO CNTR FLWD CNTR: CPT | Mod: 26

## 2023-03-09 ENCOUNTER — APPOINTMENT (OUTPATIENT)
Dept: UROLOGY | Facility: CLINIC | Age: 68
End: 2023-03-09
Payer: MEDICARE

## 2023-03-09 ENCOUNTER — NON-APPOINTMENT (OUTPATIENT)
Age: 68
End: 2023-03-09

## 2023-03-09 VITALS
TEMPERATURE: 98.5 F | OXYGEN SATURATION: 99 % | SYSTOLIC BLOOD PRESSURE: 120 MMHG | DIASTOLIC BLOOD PRESSURE: 74 MMHG | HEART RATE: 71 BPM

## 2023-03-09 LAB
ANION GAP SERPL CALC-SCNC: 14 MMOL/L
BASOPHILS # BLD AUTO: 0.03 K/UL
BASOPHILS NFR BLD AUTO: 0.3 %
BUN SERPL-MCNC: 10 MG/DL
CALCIUM SERPL-MCNC: 9.4 MG/DL
CHLORIDE SERPL-SCNC: 100 MMOL/L
CO2 SERPL-SCNC: 25 MMOL/L
CREAT SERPL-MCNC: 0.53 MG/DL
EGFR: 99 ML/MIN/1.73M2
EOSINOPHIL # BLD AUTO: 0.06 K/UL
EOSINOPHIL NFR BLD AUTO: 0.7 %
GLUCOSE SERPL-MCNC: 107 MG/DL
HCT VFR BLD CALC: 32.7 %
HGB BLD-MCNC: 10.5 G/DL
IMM GRANULOCYTES NFR BLD AUTO: 0.1 %
LYMPHOCYTES # BLD AUTO: 1.99 K/UL
LYMPHOCYTES NFR BLD AUTO: 23.1 %
MAN DIFF?: NORMAL
MCHC RBC-ENTMCNC: 28.8 PG
MCHC RBC-ENTMCNC: 32.1 GM/DL
MCV RBC AUTO: 89.6 FL
MONOCYTES # BLD AUTO: 0.75 K/UL
MONOCYTES NFR BLD AUTO: 8.7 %
NEUTROPHILS # BLD AUTO: 5.77 K/UL
NEUTROPHILS NFR BLD AUTO: 67.1 %
PLATELET # BLD AUTO: 305 K/UL
POTASSIUM SERPL-SCNC: 4.1 MMOL/L
RBC # BLD: 3.65 M/UL
RBC # FLD: 13.7 %
SODIUM SERPL-SCNC: 139 MMOL/L
WBC # FLD AUTO: 8.61 K/UL

## 2023-03-09 PROCEDURE — 52000 CYSTOURETHROSCOPY: CPT

## 2023-03-09 PROCEDURE — 99215 OFFICE O/P EST HI 40 MIN: CPT | Mod: 25

## 2023-03-10 ENCOUNTER — EMERGENCY (EMERGENCY)
Facility: HOSPITAL | Age: 68
LOS: 0 days | Discharge: HOME | End: 2023-03-10
Attending: STUDENT IN AN ORGANIZED HEALTH CARE EDUCATION/TRAINING PROGRAM
Payer: MEDICARE

## 2023-03-10 VITALS
RESPIRATION RATE: 18 BRPM | HEART RATE: 80 BPM | HEIGHT: 62 IN | OXYGEN SATURATION: 96 % | WEIGHT: 134.04 LBS | SYSTOLIC BLOOD PRESSURE: 159 MMHG | DIASTOLIC BLOOD PRESSURE: 72 MMHG | TEMPERATURE: 96 F

## 2023-03-10 DIAGNOSIS — G47.30 SLEEP APNEA, UNSPECIFIED: ICD-10-CM

## 2023-03-10 DIAGNOSIS — Z96.0 PRESENCE OF UROGENITAL IMPLANTS: ICD-10-CM
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
## 2023-03-10 DIAGNOSIS — I10 ESSENTIAL (PRIMARY) HYPERTENSION: ICD-10-CM

## 2023-03-10 DIAGNOSIS — T83.091A OTHER MECHANICAL COMPLICATION OF INDWELLING URETHRAL CATHETER, INITIAL ENCOUNTER: ICD-10-CM
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
## 2023-03-10 DIAGNOSIS — F41.0 PANIC DISORDER [EPISODIC PAROXYSMAL ANXIETY]: ICD-10-CM

## 2023-03-10 DIAGNOSIS — E78.00 PURE HYPERCHOLESTEROLEMIA, UNSPECIFIED: ICD-10-CM

## 2023-03-10 PROCEDURE — 99283 EMERGENCY DEPT VISIT LOW MDM: CPT

## 2023-03-10 PROCEDURE — 99282 EMERGENCY DEPT VISIT SF MDM: CPT

## 2023-03-10 NOTE — ED PROVIDER NOTE - OBJECTIVE STATEMENT
69 yo female, pmh of htn, hld, currently getting w/u for lesion in bladder, had urinary retention, dubose in place, noticed this am decreased outpt. Denies fever, chills, cp, sob, nvd, dysuria, hematuria.

## 2023-03-10 NOTE — ED PROVIDER NOTE - ATTENDING APP SHARED VISIT CONTRIBUTION OF CARE
69 yo F with hx of anxiety, HLD, HTN, SHANEL who presents with clogging of dubose. Pt is currently being worked up for possible bladder cancer and was seen in ED 5 days ago for urinary retention s/p dubose placement. Today noticed decreased UOP so brought to ED. No fever, chills, hematuria, pain.    CONSTITUTIONAL: well developed, nontoxic appearing, in no acute distress, speaking in full sentences  SKIN: warm, dry, no rash, cap refill < 2 seconds  HEENT: normocephalic, atraumatic, no conjunctival erythema, moist mucous membranes, patent airway  NECK: supple  CV:  regular rate, regular rhythm, 2+ radial pulses bilaterally  RESP: no wheezes, no rales, no rhonchi, normal work of breathing  ABD: soft, no tenderness, nondistended, no rebound, no guarding  MSK: normal ROM, no cyanosis, no edema  NEURO: alert, oriented, grossly unremarkable  PSYCH: cooperative, appropriate    Dubose irrigated with return of clots and normal output into bag. No further workup indicated. Stable for d/c home.

## 2023-03-10 NOTE — ED PROVIDER NOTE - CLINICAL SUMMARY MEDICAL DECISION MAKING FREE TEXT BOX
Pt here with decreased output in dubose bag. Dubose irrigated with return of clots and normal output into bag. Not likely from dehydration or dubose misplacement. No further workup indicated. Stable for d/c home.

## 2023-03-10 NOTE — ED PROVIDER NOTE - PHYSICAL EXAMINATION
Physical Exam    Vital Signs: I have reviewed the initial vital signs.  Constitutional: appears stated age, no acute distress  Eyes: Conjunctiva pink, Sclera clear  Cardiovascular: S1 and S2, regular rate, regular rhythm, well-perfused extremities, radial pulses equal and 2+, pedal pulses 2+ and equal  Respiratory: unlabored respiratory effort, clear to auscultation bilaterally no wheezing, rales and rhonchi  Gastrointestinal: soft, non-tender abdomen, no pulsatile mass, normal bowl sounds  : decreased outpt in dubose catheter bag   Musculoskeletal: supple neck, no lower extremity edema, no midline tenderness  Integumentary: warm, dry, no rash  Neurologic: awake, alert nvi

## 2023-03-10 NOTE — ED PROCEDURE NOTE - GENERAL PROCEDURE DETAILS
Left message of normal XR, no fracture, slight arthritic changes in foot.  Continue with supportive care, ice, rest and elevation, may use Tylenol up to 4000 mg per day.  No fasting needed prior to next visit.  Call with questions.     flushed with sterile water, clots irrigated, dubose flowing now

## 2023-03-10 NOTE — ED PROVIDER NOTE - PATIENT PORTAL LINK FT
You can access the FollowMyHealth Patient Portal offered by [facility] by registering at the following website: [email or web address]. By joining [facility]’s FollowMyHealth portal, you will also be able to view your health information using other applications (apps) compatible with our system.

## 2023-03-11 LAB — BACTERIA UR CULT: NORMAL

## 2023-03-14 ENCOUNTER — APPOINTMENT (OUTPATIENT)
Dept: UROLOGY | Facility: CLINIC | Age: 68
End: 2023-03-14

## 2023-03-21 VITALS
HEIGHT: 62.5 IN | SYSTOLIC BLOOD PRESSURE: 145 MMHG | WEIGHT: 130.07 LBS | RESPIRATION RATE: 17 BRPM | TEMPERATURE: 98 F | HEART RATE: 69 BPM | DIASTOLIC BLOOD PRESSURE: 79 MMHG | OXYGEN SATURATION: 96 %

## 2023-03-21 NOTE — ASU PATIENT PROFILE, ADULT - FALL HARM RISK - UNIVERSAL INTERVENTIONS
Bed in lowest position, wheels locked, appropriate side rails in place/Call bell, personal items and telephone in reach/Instruct patient to call for assistance before getting out of bed or chair/Non-slip footwear when patient is out of bed/[place] to call system/Physically safe environment - no spills, clutter or unnecessary equipment/Purposeful Proactive Rounding/Room/bathroom lighting operational, light cord in reach

## 2023-03-21 NOTE — ASU PATIENT PROFILE, ADULT - ALCOHOL USE HISTORY SINGLE SELECT
Dr Uribe notified of patient status. Virk balloon out @ 1940. Difficult to perform SVE, patient didn't tolerate well - 3/50/-3. Plan will be to start pitocin following patient eating dinner. RN placed orders per MD.    never

## 2023-03-21 NOTE — ASU PATIENT PROFILE, ADULT - HAVE YOU RECEIVED AT LEAST TWO PFIZER AND/OR MODERNA VACCINATIONS (IN ANY COMBINATION) AND/OR ONE JOHNSON & JOHNSON VACCINATION?
Anabelle Desai is a 73 year old female presenting for   Chief Complaint   Patient presents with   • Heart Problem     Reports Latex allergy or sensitivity.    Medication verified, no changes  Refills needed today: No    Health Maintenance Summary     Topic Due On Due Status Completed On    Osteoporosis Screening Sep 10, 2006 Overdue     Colorectal Cancer Screening - Colonoscopy Aug 26, 2021 Not Due Aug 26, 2016    Immunization-Zoster Sep 10, 2001 Overdue     Immunization - Pneumococcal  Completed Feb 1, 2017    Immunization - TDAP Pregnancy  Hidden     Medicare Wellness Visit Apr 6, 2018 Not Due Apr 6, 2017    IMMUNIZATION - DTaP/Tdap/Td Jul 10, 2016 Overdue Jul 10, 2006    Immunization-Influenza  Completed Sep 26, 2016            Patient is due for topics as listed above, she wishes to defer to PCP.       Yes

## 2023-03-21 NOTE — ASU PATIENT PROFILE, ADULT - NSICDXPASTMEDICALHX_GEN_ALL_CORE_FT
PAST MEDICAL HISTORY:  Anxiety disorder panic attacks    High blood cholesterol     Hypertension     Sleep apnea      PAST MEDICAL HISTORY:  Anxiety disorder panic attacks    High blood cholesterol     Hypertension     Sleep apnea Bipap

## 2023-03-22 ENCOUNTER — OUTPATIENT (OUTPATIENT)
Dept: OUTPATIENT SERVICES | Facility: HOSPITAL | Age: 68
LOS: 1 days | Discharge: HOME | End: 2023-03-22
Payer: MEDICARE

## 2023-03-22 ENCOUNTER — RESULT REVIEW (OUTPATIENT)
Age: 68
End: 2023-03-22

## 2023-03-22 ENCOUNTER — TRANSCRIPTION ENCOUNTER (OUTPATIENT)
Age: 68
End: 2023-03-22

## 2023-03-22 ENCOUNTER — APPOINTMENT (OUTPATIENT)
Dept: UROLOGY | Facility: HOSPITAL | Age: 68
End: 2023-03-22

## 2023-03-22 VITALS
OXYGEN SATURATION: 96 % | DIASTOLIC BLOOD PRESSURE: 61 MMHG | HEART RATE: 62 BPM | SYSTOLIC BLOOD PRESSURE: 137 MMHG | RESPIRATION RATE: 15 BRPM

## 2023-03-22 DIAGNOSIS — N75.0 CYST OF BARTHOLIN'S GLAND: Chronic | ICD-10-CM

## 2023-03-22 PROCEDURE — 88344 IMHCHEM/IMCYTCHM EA MLT ANTB: CPT

## 2023-03-22 PROCEDURE — 52005 CYSTO W/URTRL CATHJ: CPT | Mod: LT

## 2023-03-22 PROCEDURE — 88341 IMHCHEM/IMCYTCHM EA ADD ANTB: CPT

## 2023-03-22 PROCEDURE — C1769: CPT

## 2023-03-22 PROCEDURE — 88342 IMHCHEM/IMCYTCHM 1ST ANTB: CPT | Mod: 26

## 2023-03-22 PROCEDURE — 52240 CYSTOSCOPY AND TREATMENT: CPT

## 2023-03-22 PROCEDURE — 76000 FLUOROSCOPY <1 HR PHYS/QHP: CPT

## 2023-03-22 PROCEDURE — 88305 TISSUE EXAM BY PATHOLOGIST: CPT

## 2023-03-22 PROCEDURE — C1758: CPT

## 2023-03-22 PROCEDURE — 88341 IMHCHEM/IMCYTCHM EA ADD ANTB: CPT | Mod: 26

## 2023-03-22 PROCEDURE — 88305 TISSUE EXAM BY PATHOLOGIST: CPT | Mod: 26

## 2023-03-22 DEVICE — GUIDEWIRE SENSOR DUAL-FLEX NITINOL STRAIGHT .035" X 150CM
Type: IMPLANTABLE DEVICE | Status: NON-FUNCTIONAL
Removed: 2023-03-22

## 2023-03-22 DEVICE — GWIRE ZEBRA 038/150 ANG
Type: IMPLANTABLE DEVICE | Status: NON-FUNCTIONAL
Removed: 2023-03-22

## 2023-03-22 DEVICE — URETERAL CATH FLEXIMA OPEN END 5FR 70CM
Type: IMPLANTABLE DEVICE | Status: NON-FUNCTIONAL
Removed: 2023-03-22

## 2023-03-22 RX ORDER — PHENAZOPYRIDINE HCL 100 MG
1 TABLET ORAL
Qty: 9 | Refills: 0
Start: 2023-03-22 | End: 2023-03-24

## 2023-03-22 RX ORDER — CLONAZEPAM 1 MG
0.75 TABLET ORAL
Qty: 0 | Refills: 0 | DISCHARGE

## 2023-03-22 RX ORDER — SODIUM CHLORIDE 9 MG/ML
1000 INJECTION INTRAMUSCULAR; INTRAVENOUS; SUBCUTANEOUS
Refills: 0 | Status: DISCONTINUED | OUTPATIENT
Start: 2023-03-22 | End: 2023-03-22

## 2023-03-22 RX ORDER — OXYBUTYNIN CHLORIDE 5 MG
5 TABLET ORAL ONCE
Refills: 0 | Status: COMPLETED | OUTPATIENT
Start: 2023-03-22 | End: 2023-03-22

## 2023-03-22 RX ORDER — ACETAMINOPHEN 500 MG
650 TABLET ORAL EVERY 6 HOURS
Refills: 0 | Status: DISCONTINUED | OUTPATIENT
Start: 2023-03-22 | End: 2023-03-22

## 2023-03-22 RX ORDER — PHENAZOPYRIDINE HCL 100 MG
100 TABLET ORAL ONCE
Refills: 0 | Status: COMPLETED | OUTPATIENT
Start: 2023-03-22 | End: 2023-03-22

## 2023-03-22 RX ORDER — KETOROLAC TROMETHAMINE 30 MG/ML
10 SYRINGE (ML) INJECTION ONCE
Refills: 0 | Status: DISCONTINUED | OUTPATIENT
Start: 2023-03-22 | End: 2023-03-22

## 2023-03-22 RX ADMIN — Medication 100 MILLIGRAM(S): at 19:03

## 2023-03-22 RX ADMIN — Medication 5 MILLIGRAM(S): at 19:03

## 2023-03-22 NOTE — BRIEF OPERATIVE NOTE - NSICDXBRIEFPOSTOP_GEN_ALL_CORE_FT
POST-OP DIAGNOSIS:  Bladder neoplasm 22-Mar-2023 18:34:01  Hayley Moulton  Urethral cancer 22-Mar-2023 18:34:12  Hayley Moulton

## 2023-03-22 NOTE — BRIEF OPERATIVE NOTE - NSICDXBRIEFPROCEDURE_GEN_ALL_CORE_FT
PROCEDURES:  Cystourethroscopy with bladder tumor resection, large 22-Mar-2023 18:33:35  Hayley Moulton

## 2023-03-22 NOTE — ASU DISCHARGE PLAN (ADULT/PEDIATRIC) - NS MD DC FALL RISK RISK
For information on Fall & Injury Prevention, visit: [email or web address] OR  [email or web address] OR  https://www.cdc.gov/steadi/patient.html

## 2023-03-22 NOTE — PRE-ANESTHESIA EVALUATION ADULT - NSPROPOSEDPROCEDFT_GEN_ALL_CORE
Cystoscopy, transurethral resection of bladder tumor large, cystoscopy with insertion oif ureteral stent, TURBT possible ureteral

## 2023-03-22 NOTE — BRIEF OPERATIVE NOTE - OPERATION/FINDINGS
Necrotic but solid mass extending from urethra to left trigone. Mass is fixed and large on bimanual exam

## 2023-03-22 NOTE — BRIEF OPERATIVE NOTE - NSICDXBRIEFPREOP_GEN_ALL_CORE_FT
PRE-OP DIAGNOSIS:  Bladder tumor 22-Mar-2023 16:16:11  Hayley Moulton  Urethral tumor 22-Mar-2023 16:16:30  Hayley Moulton  
PRE-OP DIAGNOSIS:  Bladder tumor 22-Mar-2023 16:16:11  Hayley Moulton  Urethral tumor 22-Mar-2023 16:16:30  Hayley Moulton

## 2023-03-31 LAB — SURGICAL PATHOLOGY STUDY: SIGNIFICANT CHANGE UP

## 2023-04-11 ENCOUNTER — APPOINTMENT (OUTPATIENT)
Dept: UROLOGY | Facility: CLINIC | Age: 68
End: 2023-04-11
Payer: MEDICARE

## 2023-04-11 VITALS
OXYGEN SATURATION: 99 % | RESPIRATION RATE: 16 BRPM | DIASTOLIC BLOOD PRESSURE: 77 MMHG | SYSTOLIC BLOOD PRESSURE: 149 MMHG | HEART RATE: 61 BPM | TEMPERATURE: 97.8 F

## 2023-04-11 PROBLEM — G47.30 SLEEP APNEA, UNSPECIFIED: Chronic | Status: ACTIVE | Noted: 2022-08-12

## 2023-04-11 PROCEDURE — 99215 OFFICE O/P EST HI 40 MIN: CPT

## 2023-04-13 ENCOUNTER — EMERGENCY (EMERGENCY)
Facility: HOSPITAL | Age: 68
LOS: 0 days | Discharge: HOME | End: 2023-04-13
Attending: EMERGENCY MEDICINE
Payer: MEDICARE

## 2023-04-13 VITALS
SYSTOLIC BLOOD PRESSURE: 187 MMHG | DIASTOLIC BLOOD PRESSURE: 92 MMHG | OXYGEN SATURATION: 98 % | HEART RATE: 102 BPM | WEIGHT: 136.03 LBS | HEIGHT: 62.5 IN | TEMPERATURE: 97 F | RESPIRATION RATE: 18 BRPM

## 2023-04-13 VITALS — HEART RATE: 69 BPM | SYSTOLIC BLOOD PRESSURE: 137 MMHG | DIASTOLIC BLOOD PRESSURE: 71 MMHG

## 2023-04-13 DIAGNOSIS — R33.9 RETENTION OF URINE, UNSPECIFIED: ICD-10-CM

## 2023-04-13 DIAGNOSIS — E78.00 PURE HYPERCHOLESTEROLEMIA, UNSPECIFIED: ICD-10-CM

## 2023-04-13 DIAGNOSIS — F41.0 PANIC DISORDER [EPISODIC PAROXYSMAL ANXIETY]: ICD-10-CM

## 2023-04-13 DIAGNOSIS — R10.9 UNSPECIFIED ABDOMINAL PAIN: ICD-10-CM

## 2023-04-13 DIAGNOSIS — I10 ESSENTIAL (PRIMARY) HYPERTENSION: ICD-10-CM

## 2023-04-13 DIAGNOSIS — G47.30 SLEEP APNEA, UNSPECIFIED: ICD-10-CM

## 2023-04-13 DIAGNOSIS — N75.0 CYST OF BARTHOLIN'S GLAND: Chronic | ICD-10-CM

## 2023-04-13 LAB
ALBUMIN SERPL ELPH-MCNC: 4.5 G/DL — SIGNIFICANT CHANGE UP (ref 3.5–5.2)
ALP SERPL-CCNC: 92 U/L — SIGNIFICANT CHANGE UP (ref 30–115)
ALT FLD-CCNC: 21 U/L — SIGNIFICANT CHANGE UP (ref 0–41)
ANION GAP SERPL CALC-SCNC: 11 MMOL/L — SIGNIFICANT CHANGE UP (ref 7–14)
APPEARANCE UR: CLEAR — SIGNIFICANT CHANGE UP
AST SERPL-CCNC: 22 U/L — SIGNIFICANT CHANGE UP (ref 0–41)
BACTERIA # UR AUTO: ABNORMAL
BASOPHILS # BLD AUTO: 0.02 K/UL — SIGNIFICANT CHANGE UP (ref 0–0.2)
BASOPHILS NFR BLD AUTO: 0.2 % — SIGNIFICANT CHANGE UP (ref 0–1)
BILIRUB SERPL-MCNC: 0.2 MG/DL — SIGNIFICANT CHANGE UP (ref 0.2–1.2)
BILIRUB UR-MCNC: NEGATIVE — SIGNIFICANT CHANGE UP
BUN SERPL-MCNC: 13 MG/DL — SIGNIFICANT CHANGE UP (ref 10–20)
CALCIUM SERPL-MCNC: 9.7 MG/DL — SIGNIFICANT CHANGE UP (ref 8.4–10.5)
CHLORIDE SERPL-SCNC: 103 MMOL/L — SIGNIFICANT CHANGE UP (ref 98–110)
CO2 SERPL-SCNC: 27 MMOL/L — SIGNIFICANT CHANGE UP (ref 17–32)
COLOR SPEC: YELLOW — SIGNIFICANT CHANGE UP
CREAT SERPL-MCNC: 0.7 MG/DL — SIGNIFICANT CHANGE UP (ref 0.7–1.5)
DIFF PNL FLD: ABNORMAL
EGFR: 93 ML/MIN/1.73M2 — SIGNIFICANT CHANGE UP
EOSINOPHIL # BLD AUTO: 0.06 K/UL — SIGNIFICANT CHANGE UP (ref 0–0.7)
EOSINOPHIL NFR BLD AUTO: 0.7 % — SIGNIFICANT CHANGE UP (ref 0–8)
EPI CELLS # UR: ABNORMAL /HPF
GLUCOSE SERPL-MCNC: 117 MG/DL — HIGH (ref 70–99)
GLUCOSE UR QL: NEGATIVE MG/DL — SIGNIFICANT CHANGE UP
HCT VFR BLD CALC: 33.4 % — LOW (ref 37–47)
HGB BLD-MCNC: 10.8 G/DL — LOW (ref 12–16)
IMM GRANULOCYTES NFR BLD AUTO: 0.5 % — HIGH (ref 0.1–0.3)
KETONES UR-MCNC: NEGATIVE — SIGNIFICANT CHANGE UP
LEUKOCYTE ESTERASE UR-ACNC: ABNORMAL
LYMPHOCYTES # BLD AUTO: 1.73 K/UL — SIGNIFICANT CHANGE UP (ref 1.2–3.4)
LYMPHOCYTES # BLD AUTO: 21.2 % — SIGNIFICANT CHANGE UP (ref 20.5–51.1)
MCHC RBC-ENTMCNC: 28 PG — SIGNIFICANT CHANGE UP (ref 27–31)
MCHC RBC-ENTMCNC: 32.3 G/DL — SIGNIFICANT CHANGE UP (ref 32–37)
MCV RBC AUTO: 86.5 FL — SIGNIFICANT CHANGE UP (ref 81–99)
MONOCYTES # BLD AUTO: 0.62 K/UL — HIGH (ref 0.1–0.6)
MONOCYTES NFR BLD AUTO: 7.6 % — SIGNIFICANT CHANGE UP (ref 1.7–9.3)
NEUTROPHILS # BLD AUTO: 5.68 K/UL — SIGNIFICANT CHANGE UP (ref 1.4–6.5)
NEUTROPHILS NFR BLD AUTO: 69.8 % — SIGNIFICANT CHANGE UP (ref 42.2–75.2)
NITRITE UR-MCNC: NEGATIVE — SIGNIFICANT CHANGE UP
NRBC # BLD: 0 /100 WBCS — SIGNIFICANT CHANGE UP (ref 0–0)
PH UR: 7 — SIGNIFICANT CHANGE UP (ref 5–8)
PLATELET # BLD AUTO: 331 K/UL — SIGNIFICANT CHANGE UP (ref 130–400)
PMV BLD: 10.1 FL — SIGNIFICANT CHANGE UP (ref 7.4–10.4)
POTASSIUM SERPL-MCNC: 4.1 MMOL/L — SIGNIFICANT CHANGE UP (ref 3.5–5)
POTASSIUM SERPL-SCNC: 4.1 MMOL/L — SIGNIFICANT CHANGE UP (ref 3.5–5)
PROT SERPL-MCNC: 7.1 G/DL — SIGNIFICANT CHANGE UP (ref 6–8)
PROT UR-MCNC: 30 MG/DL
RBC # BLD: 3.86 M/UL — LOW (ref 4.2–5.4)
RBC # FLD: 14.4 % — SIGNIFICANT CHANGE UP (ref 11.5–14.5)
RBC CASTS # UR COMP ASSIST: ABNORMAL /HPF
SODIUM SERPL-SCNC: 141 MMOL/L — SIGNIFICANT CHANGE UP (ref 135–146)
SP GR SPEC: 1.01 — SIGNIFICANT CHANGE UP (ref 1.01–1.03)
UROBILINOGEN FLD QL: 0.2 MG/DL — SIGNIFICANT CHANGE UP
WBC # BLD: 8.15 K/UL — SIGNIFICANT CHANGE UP (ref 4.8–10.8)
WBC # FLD AUTO: 8.15 K/UL — SIGNIFICANT CHANGE UP (ref 4.8–10.8)
WBC UR QL: ABNORMAL /HPF

## 2023-04-13 PROCEDURE — 85025 COMPLETE CBC W/AUTO DIFF WBC: CPT

## 2023-04-13 PROCEDURE — 99284 EMERGENCY DEPT VISIT MOD MDM: CPT

## 2023-04-13 PROCEDURE — 87077 CULTURE AEROBIC IDENTIFY: CPT

## 2023-04-13 PROCEDURE — 81001 URINALYSIS AUTO W/SCOPE: CPT

## 2023-04-13 PROCEDURE — 87086 URINE CULTURE/COLONY COUNT: CPT

## 2023-04-13 PROCEDURE — 36415 COLL VENOUS BLD VENIPUNCTURE: CPT

## 2023-04-13 PROCEDURE — 99283 EMERGENCY DEPT VISIT LOW MDM: CPT | Mod: 25

## 2023-04-13 PROCEDURE — 51702 INSERT TEMP BLADDER CATH: CPT

## 2023-04-13 PROCEDURE — 80053 COMPREHEN METABOLIC PANEL: CPT

## 2023-04-13 PROCEDURE — 87186 SC STD MICRODIL/AGAR DIL: CPT

## 2023-04-13 NOTE — ED PROVIDER NOTE - OBJECTIVE STATEMENT
68-year-old female with past history of urethral mass anxiety hypertension presents with suprapubic abdominal pain radiating to the back. Patient states this felt like her urinary obstructions that have happened in the past. Patient sees urologist at [place] and states that patient received TURP but needs to go through chemoradiation in order to reduce size of urethral mass prior to resection. Bedside ultrasound shows 891 mL of urine in bladder with mild hydro bilaterally. After insertion of Farrell catheter patient states that pain has resolved.

## 2023-04-13 NOTE — ED PROVIDER NOTE - NSFOLLOWUPINSTRUCTIONS_ED_ALL_ED_FT
You have a history of urinary retention, and have an indwelling Farrell catheter to alleviate your bladder of urine. Please continue to take your medications as prescribed and change your catheter per instructions from your urology team. Please follow up with the urology team in the outpatient setting

## 2023-04-13 NOTE — ED PROVIDER NOTE - CARE PROVIDERS DIRECT ADDRESSES
[email or web address]

## 2023-04-13 NOTE — ED PROVIDER NOTE - PATIENT PORTAL LINK FT
You can access the FollowMyHealth Patient Portal offered by [facility] by registering at the following website: [email or web address]. By joining [facility]’s FollowMyHealth portal, you will also be able to view your health information using other applications (apps) compatible with our system.

## 2023-04-13 NOTE — ED PROVIDER NOTE - CARE PROVIDER_API CALL
Gurinder Avilez)  Urology  [address] [place] [address], [address]  [place] [address]  Phone: [phone]  Fax: [phone]  Follow Up Time: 1-3 Days

## 2023-04-13 NOTE — ED PROVIDER NOTE - CLINICAL SUMMARY MEDICAL DECISION MAKING FREE TEXT BOX
Patient 2 days status post removal of Farrell after TURB returns in retention since last night, no fever, no back pain, Farrell placed with return of 1200 cc mariama urine, labs and studies appreciated, is afebrile without elevated WBC and no bacteria in urine no antibiotics at this time, will follow culture, discharge with leg bag to follow-up with her urologist.  Patient counseled regarding conditions which should prompt return.

## 2023-04-13 NOTE — ED ADULT NURSE NOTE - NSICDXPASTMEDICALHX_GEN_ALL_CORE_FT
PAST MEDICAL HISTORY:  Anxiety disorder panic attacks    High blood cholesterol     Hypertension     Sleep apnea Bipap

## 2023-04-13 NOTE — ED ADULT NURSE REASSESSMENT NOTE - NS ED NURSE REASSESS COMMENT FT1
patient refused changing dubose bag to leg bag; patient agreed to taking leg bag home; 1000cc of urine drained from dubose bag

## 2023-04-13 NOTE — ED PROVIDER NOTE - PHYSICAL EXAMINATION
PHYSICAL EXAM: I have reviewed current vital signs.  GENERAL: NAD, well-nourished; well-developed.  HEAD:  Normocephalic, atraumatic.  EYES: EOMI, PERRL, conjunctiva and sclera clear.  ENT: MMM, no erythema/exudates.  NECK: Supple, no JVD.  CHEST/LUNG: Clear to auscultation bilaterally; no wheezes, rales, or rhonchi.  HEART: Regular rate and rhythm, normal S1 and S2; no murmurs, rubs, or gallops.  ABDOMEN: Soft, Suprapubic abd tenderness, nondistended.  (+) cva tenderness bilaterally  EXTREMITIES:  2+ peripheral pulses; no clubbing, cyanosis, or edema.  PSYCH: Cooperative, appropriate, normal mood and affect.  NEUROLOGY: A&O x 3. Motor 5/5. Sensory intact. No focal neurological deficits. CN II - XII intact. (-) dysmetria, facial droop, pronator drift.  SKIN: Warm and dry.

## 2023-04-13 NOTE — ED PROVIDER NOTE - PROGRESS NOTE DETAILS
brigida- Patient reports alleviation of symptoms 900 mL of urine in Farrell bag after insertion. Patient states she feels better lab work unremarkable for ANT urine sent advised patient to follow-up with urologist at earliest convenience.

## 2023-04-13 NOTE — ED PROVIDER NOTE - NS ED ROS FT
Constitutional:  No fevers or chills.  Eyes:  No visual changes, eye pain, or discharge.  ENT:  No hearing changes. No sore throat.  Neck:  No neck pain or stiffness.  Cardiac:  No CP or edema.  Resp:  No cough or SOB. No hemoptysis.   GI:  No nausea, vomiting, diarrhea, (+) abdominal pain.  :  No dysuria, frequency, or hematuria.  (+) urinary retention  MSK:  No myalgias or joint pain/swelling.  Neuro:  No headache, dizziness, or weakness.  Skin:  No skin rash.

## 2023-04-17 NOTE — ADDENDUM
[FreeTextEntry1] : 4/14/2023: NCCT\par Thyroid nodule. (she had those recently checked)\par MIld centrilobular and moderate paraseptal emphysema is most pronounces in the apices. There are associated Blebs. Mild scarring. No discrete nodules. Mild ectasia. No interstitial disease. ADrenals are normal. No bone lesions\par \par 4/17/2023:\par Pt had dubose put back in for retention. They just called her with results saying she had a UTI. She was asymptomatic. I told her it is okay for her to take the Augmentin even though it may just be ASB. She understood. She sees Dr. Banks on Wednesday. Her questions were answered

## 2023-04-17 NOTE — HISTORY OF PRESENT ILLNESS
[FreeTextEntry1] : Language: English\par Date of First visit: 3/9/2023\par Accompanied by: - Pratik\par Contact info: ***\par Referring Provider/PCP: Alondra Van\par [facility] [phone]\par fax [phone]\par \par \par \par CC/ Problem List:\par \par ===============================================================================\par FIRST VISIT:\par The patient was a 68 year female with an extensive smoking history and kidney stones who first presented on 2023 for gross hematuria. \par She first noticed some blood on 3/3/2023 and went to the ER on Saturday for urinary retention. Dubose catheter was placed. PVR 1100 ml. No imaging was done in the ER. Patient was started on Cefpodoxime 200 mg BID for 10 days. She does reports clots and the urine is starting to clear up. Denies fevers, but did have chills. \par \par She is an active smoker. Reports that she has cut down to 4 cigarettes a day over the past year. She has had kidney stones in the past, saw Dr. Rubin had started ER on medication to "break up the stones". Hematuria w/u in 2018 was negative with Dr. Rubin included cysto and CT Urogram. \par \par \par -------------------------------------------------------------------------------------------\par INTERVAL VISITS:\par \par Her cysto showed bladder and urethral abnormalities that almost seemed submucosal\par \par The patient's age today 2023 is 68 year old.\par Please note interval events and changes in PMH, PSH, MEDS and ALLERGIES were reviewed.\par \par On 3/22/2023 she underwent a TURBT of her bladder neck and bladder which revealed at least T2 HG TCC. Interestingly some of the surface mucosa was NOT involved.\par She has been doing well with her dubose. She has not had dysuria/urethral burning. She quit smoking in mid March. She is eating a lot.\par \par \par ===============================================================================\par \par PMH: pre-HTN, Sleep apnea, M spike (monoclonal spike)\par PSH: Bartholin's cyst\par POBH: (if applicable) \par FH: Mother had heart disease, Father had bone cancer\par \par ALL: NKDA\par MEDS: Amlodipine, Cymbalta, Clonopin, Crestor, Vit D, MVI, Calcium, Pre-biotic/probiotic, Metamucil, Beet juice\par SOC: Active smoker trying to quit, Denies etOH, denies drugs\par \par \par ROS: Review of Systems is as per HPI unless otherwise denoted below\par \par \par ===============================================================================\par DATA: \par \par LABS:-------------------------------------------------------------------------------------------------------------------\par 3/7/2023: HCT 32.7, sCRe 0.53\par 3/9/2023: Urine culture negative\par \par RADS:-------------------------------------------------------------------------------------------------------------------\par 3/8/2023: CTU\par 4.5 cm irregular thick walled rim-enhancing mass with central cystic/necrotic changes centered at level of urethra (left side near the bladder, moving more anteriorly distally) extending close to left ureteral orifice without hydronephrosis, suspect urothelial malignancy. Recommend cystoscopy and tissue biopsy correlation.\par No evidence of upper tract disease, distant metastases or suspicious lymphadenopathy.\par Thickened endometrial complex, correlate with pathology results given history of recent biopsy.\par films personally reviewed\par \par \par PATHOLOGY/CYTOLOGY:-------------------------------------------------------------------------------------------\par 3/7/2023: Cytology [facility]\par \par 3/22/2023: TURBT with bladder neck and bladder sections\par a. Left bladder neck: \par HG TCC with glandular differentiation, T2, no LVI\par Surface involvement not seen\par b Left trigone\par HG TCC with glandular differentiation, T2, no LVI\par c. Anterior bladder neck\par HG TCC, T2, no LVI, surface involvement not seen\par d. Bladder neck biopsy\par HG TCC with glandular differentiation, T2, No LVI, surface involvement not seen\par \par \par VOIDING STUDIES: ----------------------------------------------------------------------------------------------------\par \par \par \par STONE STUDIES: (Analysis/LLSA)----------------------------------------------------------------------------------\par \par \par \par PROCEDURES: -----------------------------------------------------------------------------------------------\par 3/9/2023: Cysto\par The patient had a flat solid appearing tumor involving the left trigone and lateral wall potentially going into the urethra.  There was some posterior wall / dome inflammation as well  I could not see either UO. ? Mass in the urethra\par A dubose was replaced after this procedure under aseptic conditions. Clear urine was obtained.\par \par 2023: TOV\par I filled her with about 160cc. She voided into the toilet without straining. She did not leak. Her PVR was 23cc.\par We gave her a dose of Cefuroxime 500mg po.\par ===============================================================================\par \par PHYSICAL EXAM:\par \par GEN: AAOx3, NAD, Habitus: normal\par \par BARRIERS to CARE: none\par \par PSYCH: Appropriate Behavior, Affect Congruent\par \par HEENT: AT/NC Trachea midline. \par \par Lungs: No labored breathing.\par \par NEURO: + Movement, all 4 extremities grossly intact without deficits. No tremors.\par \par SKIN: Warm dry. No visible rashes or ulcers\par \par GAIT: Gait normal, Stability good\par \par  FOCUSED: -------done 3/9/2023------------------------------------------------\par \par Bladder: Nondistended, Nontender, No masses\par \par Ext Genitalia: Normal, Atrophic\par \par Urethra: Normal, no masses, fixed and immobile\par \par Vagina: Normal angle, Short length Length, mass palpated anterior vaginal wall around urethra bladder, this mass is fixed and solid\par \par =======================================================================================\par \par \par \par ASSESSMENT and PLAN\par \par The patient is a 68 year female with a history of the following:\par \par 1. 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 We discussed her path results at length.\par I do not think she is currently resectable. I think she will need chemo to see if she becomes resectable and then we can consider this. She understood.\par \par I encouraged her to continue her smoking cessation, stay health and eat healthily. She agreed to this. \par \par She passed her TOV today 2023. The patient is aware that she may have dysuria or hematuria after this procedure. This should resolve. If these symptoms get worse or she develops fever or can't void, the patient will call our office. Discharge instructions were given.\par \par -----------------------------------------------------------------------------------------------------\par LABS/TESTS Ordered: NCCT CT chest, Med Onc\par Meds Ordered:\par Follow up: 1 month for PVR symptom check\par -----------------------------------------------------------------------------------------------------\par \[facility] The total amount of time I have personally spent preparing for this visit, reviewing the patient's test results, obtaining external history, ordering tests/medications, documenting clinical information, communicating with and counseling the patient/family and/or caregiver(s), and spent face to face with the patient explaining the above was  45 minutes.\par \[facility] Thank you for allowing me to assist in the care of your patient. Should you have any questions please do not hesitate to reach out to me.\par \[facility] \[facility] Hayley Moulton MD\[facility] Associate \[facility] Department of Urology\[facility] Phone: [phone]\[facility] Fax: [phone] \[place] [address] [facility] [address]\[facility] \[facility]

## 2023-04-19 ENCOUNTER — NON-APPOINTMENT (OUTPATIENT)
Age: 68
End: 2023-04-19

## 2023-04-19 ENCOUNTER — LABORATORY RESULT (OUTPATIENT)
Age: 68
End: 2023-04-19

## 2023-04-19 ENCOUNTER — APPOINTMENT (OUTPATIENT)
Dept: HEMATOLOGY ONCOLOGY | Facility: CLINIC | Age: 68
End: 2023-04-19
Payer: MEDICARE

## 2023-04-19 ENCOUNTER — RESULT REVIEW (OUTPATIENT)
Age: 68
End: 2023-04-19

## 2023-04-19 VITALS
RESPIRATION RATE: 18 BRPM | TEMPERATURE: 97.3 F | HEART RATE: 72 BPM | WEIGHT: 137 LBS | OXYGEN SATURATION: 98 % | HEIGHT: 63 IN | BODY MASS INDEX: 24.27 KG/M2 | SYSTOLIC BLOOD PRESSURE: 123 MMHG | DIASTOLIC BLOOD PRESSURE: 80 MMHG

## 2023-04-19 PROCEDURE — 99205 OFFICE O/P NEW HI 60 MIN: CPT | Mod: 25

## 2023-04-19 PROCEDURE — 36415 COLL VENOUS BLD VENIPUNCTURE: CPT

## 2023-04-19 RX ORDER — PHENAZOPYRIDINE 200 MG/1
200 TABLET, FILM COATED ORAL 3 TIMES DAILY
Qty: 10 | Refills: 0 | Status: DISCONTINUED | COMMUNITY
Start: 2023-03-22 | End: 2023-04-19

## 2023-04-19 RX ORDER — PEG-3350, SODIUM SULFATE, SODIUM CHLORIDE, POTASSIUM CHLORIDE, SODIUM ASCORBATE AND ASCORBIC ACID 7.5-2.691G
100 KIT ORAL
Qty: 1 | Refills: 0 | Status: DISCONTINUED | COMMUNITY
Start: 2021-11-16 | End: 2023-04-19

## 2023-04-19 RX ORDER — POLYETHYLENE GLYCOL 3350 AND ELECTROLYTES WITH LEMON FLAVOR 236; 22.74; 6.74; 5.86; 2.97 G/4L; G/4L; G/4L; G/4L; G/4L
236 POWDER, FOR SOLUTION ORAL
Qty: 1 | Refills: 0 | Status: DISCONTINUED | COMMUNITY
Start: 2021-11-16 | End: 2023-04-19

## 2023-04-19 RX ORDER — LIDOCAINE AND PRILOCAINE 25; 25 MG/G; MG/G
2.5-2.5 CREAM TOPICAL
Qty: 1 | Refills: 2 | Status: ACTIVE | COMMUNITY
Start: 2023-04-19 | End: 1900-01-01

## 2023-04-19 NOTE — HISTORY OF PRESENT ILLNESS
[de-identified] : Sandra Alston is a 68 year old here referred by Dr. Moultno for evaluation of newly diagnosed bladder cancer\par \par Oncologic history:\par 1.  bladder cancer\par --presented with hematuria and urinary retention\par --CT scan urogram 3/8/23 showed a 4.5 cm thick walled, rim enhancing mass with necrosis at the level of the urethra extending close to the L ureteral orifice.  No lymphadenopathy.  no visceral metastatic disease\par --cystoscopy 3/9/23 showed a flat solid appearing tumor at the L trigone and Lateral wall of the bladder extending into the urethra\par --TURBT 3/22/23:  biopsies from L bladder neck, L trigone, anterior bladder neck, and bladder neck all showing high grade, muscle invasive urothelial carcinoma.\par --chest CT 4/14/23 negative for thoracic metastatic disease\par \par CT urogram also showed a thickened endometrial stripe - patient reports this is a known finding and an endometrial biopsy with a gynecologist last year that was normal/negative.\par \par PMH also notable for MGUS - no records - follows with Dr Brown on [address].  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 , lives on [address]

## 2023-04-19 NOTE — PHYSICAL EXAM
[Restricted in physically strenuous activity but ambulatory and able to carry out work of a light or sedentary nature] : Status 1- Restricted in physically strenuous activity but ambulatory and able to carry out work of a light or sedentary nature, e.g., light house work, office work [Normal] : affect appropriate [de-identified] : Farrell catheter in place with clear yellow urine

## 2023-04-19 NOTE — ASSESSMENT
[FreeTextEntry1] : Sandra Alston is a 68 year old female who presented with gross hematuria and urinary retention.  CT urogram showed a 4 cm tumor in the bladder and extending into the mid urethra.  No lymphadenopathy or distant metastatic disease on CT c/a/p.  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  \par \par Plan:\par 1.  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  this is a potentially curable malignancy\par --Standard of care for muscle invasive bladder cancer is neoadjuvant chemotherapy.  Based on her age, functional status, comorbid conditions - recommend treatment with gemcitabine and cisplatin x 4 cycles (12 weeks) if tolerated.  RIsks, side effects of chemotherapy discussed in detail w/ patient and literature provided.  Discussed alternative options of MVAC chemotherapy which is more toxic and would carry significant risk at her age;  also second opinion consultation.  Pt consented to proceed w/ gem/cis chemotherapy\par --refer to IR for port placement\par --Rx for zofran PRN for chemo induced nausea and loperamide PRN for chemo induced diarrhea\par --labs today pre-chemo:  CBC, CMP, and PT/PTT in preparation for port placement.\par \par 2.  urinary retention\par --dubose in place, has f/u with Dr Moulton 5/2023 for change.\par \par 3.  MGUS - history noted, follows with hematologist on [place].  defer further w/u for now.\par \par RTC for C2 chemotherapy\par with CBC, CMP, mag.

## 2023-04-19 NOTE — REVIEW OF SYSTEMS
[Fatigue] : fatigue [Dysuria] : dysuria [Negative] : Heme/Lymph [Fever] : no fever [Chills] : no chills [Night Sweats] : no night sweats [Recent Change In Weight] : ~T no recent weight change [Vaginal Discharge] : no vaginal discharge [Dysmenorrhea/Abn Vaginal Bleeding] : no dysmenorrhea/abnormal vaginal bleeding [FreeTextEntry8] : h/o hematuria, Farrell catheter in place, c/c burning pain due to catheter

## 2023-04-20 LAB
APTT BLD: 31.9 SEC
INR PPP: 1.12
PT BLD: 13.4 SEC

## 2023-04-25 ENCOUNTER — APPOINTMENT (OUTPATIENT)
Dept: INTERVENTIONAL RADIOLOGY/VASCULAR | Facility: HOSPITAL | Age: 68
End: 2023-04-25
Payer: MEDICARE

## 2023-04-25 ENCOUNTER — OUTPATIENT (OUTPATIENT)
Dept: OUTPATIENT SERVICES | Facility: HOSPITAL | Age: 68
LOS: 1 days | End: 2023-04-25
Payer: MEDICARE

## 2023-04-25 DIAGNOSIS — N75.0 CYST OF BARTHOLIN'S GLAND: Chronic | ICD-10-CM

## 2023-04-25 PROCEDURE — 36561 INSERT TUNNELED CV CATH: CPT

## 2023-04-25 PROCEDURE — 76937 US GUIDE VASCULAR ACCESS: CPT | Mod: 26

## 2023-04-25 PROCEDURE — C1769: CPT

## 2023-04-25 PROCEDURE — 76937 US GUIDE VASCULAR ACCESS: CPT

## 2023-04-25 PROCEDURE — C1788: CPT

## 2023-04-26 RX ORDER — DEXAMETHASONE 0.5 MG/5ML
10 ELIXIR ORAL ONCE
Refills: 0 | Status: COMPLETED | OUTPATIENT
Start: 2023-04-27 | End: 2023-04-27

## 2023-04-26 RX ORDER — PALONOSETRON HYDROCHLORIDE 0.25 MG/5ML
0.25 INJECTION, SOLUTION INTRAVENOUS ONCE
Refills: 0 | Status: COMPLETED | OUTPATIENT
Start: 2023-04-27 | End: 2023-04-27

## 2023-04-26 RX ORDER — SODIUM CHLORIDE 9 MG/ML
1000 INJECTION INTRAMUSCULAR; INTRAVENOUS; SUBCUTANEOUS ONCE
Refills: 0 | Status: COMPLETED | OUTPATIENT
Start: 2023-04-27 | End: 2023-04-27

## 2023-04-26 RX ORDER — SODIUM CHLORIDE 9 MG/ML
1000 INJECTION, SOLUTION INTRAVENOUS
Refills: 0 | Status: COMPLETED | OUTPATIENT
Start: 2023-05-18 | End: 2023-05-18

## 2023-04-26 RX ORDER — SODIUM CHLORIDE 9 MG/ML
1000 INJECTION, SOLUTION INTRAVENOUS
Refills: 0 | Status: COMPLETED | OUTPATIENT
Start: 2023-06-29 | End: 2023-06-29

## 2023-04-26 RX ORDER — FOSAPREPITANT DIMEGLUMINE 150 MG/5ML
150 INJECTION, POWDER, LYOPHILIZED, FOR SOLUTION INTRAVENOUS ONCE
Refills: 0 | Status: COMPLETED | OUTPATIENT
Start: 2023-04-27 | End: 2023-04-27

## 2023-04-26 RX ORDER — SODIUM CHLORIDE 9 MG/ML
1000 INJECTION, SOLUTION INTRAVENOUS
Refills: 0 | Status: COMPLETED | OUTPATIENT
Start: 2023-04-27 | End: 2023-04-27

## 2023-04-27 ENCOUNTER — OUTPATIENT (OUTPATIENT)
Dept: OUTPATIENT SERVICES | Facility: HOSPITAL | Age: 68
LOS: 1 days | End: 2023-04-27
Payer: MEDICARE

## 2023-04-27 ENCOUNTER — NON-APPOINTMENT (OUTPATIENT)
Age: 68
End: 2023-04-27

## 2023-04-27 ENCOUNTER — APPOINTMENT (OUTPATIENT)
Dept: INFUSION THERAPY | Facility: CLINIC | Age: 68
End: 2023-04-27

## 2023-04-27 VITALS
OXYGEN SATURATION: 99 % | RESPIRATION RATE: 18 BRPM | SYSTOLIC BLOOD PRESSURE: 150 MMHG | HEART RATE: 75 BPM | WEIGHT: 136.91 LBS | HEIGHT: 63 IN | TEMPERATURE: 97 F | DIASTOLIC BLOOD PRESSURE: 83 MMHG

## 2023-04-27 VITALS
RESPIRATION RATE: 18 BRPM | SYSTOLIC BLOOD PRESSURE: 145 MMHG | TEMPERATURE: 97 F | DIASTOLIC BLOOD PRESSURE: 75 MMHG | OXYGEN SATURATION: 99 % | HEART RATE: 75 BPM

## 2023-04-27 DIAGNOSIS — N75.0 CYST OF BARTHOLIN'S GLAND: Chronic | ICD-10-CM

## 2023-04-27 DIAGNOSIS — C68.0 MALIGNANT NEOPLASM OF URETHRA: ICD-10-CM

## 2023-04-27 PROCEDURE — 96367 TX/PROPH/DG ADDL SEQ IV INF: CPT

## 2023-04-27 PROCEDURE — 96361 HYDRATE IV INFUSION ADD-ON: CPT

## 2023-04-27 PROCEDURE — 96413 CHEMO IV INFUSION 1 HR: CPT

## 2023-04-27 PROCEDURE — 96417 CHEMO IV INFUS EACH ADDL SEQ: CPT

## 2023-04-27 PROCEDURE — 96375 TX/PRO/DX INJ NEW DRUG ADDON: CPT

## 2023-04-27 RX ORDER — CISPLATIN 1 MG/ML
115 INJECTION, SOLUTION INTRAVENOUS ONCE
Refills: 0 | Status: COMPLETED | OUTPATIENT
Start: 2023-04-27 | End: 2023-04-27

## 2023-04-27 RX ORDER — GEMCITABINE 38 MG/ML
1650 INJECTION, SOLUTION INTRAVENOUS ONCE
Refills: 0 | Status: COMPLETED | OUTPATIENT
Start: 2023-04-27 | End: 2023-04-27

## 2023-04-27 RX ADMIN — FOSAPREPITANT DIMEGLUMINE 150 MILLIGRAM(S): 150 INJECTION, POWDER, LYOPHILIZED, FOR SOLUTION INTRAVENOUS at 13:20

## 2023-04-27 RX ADMIN — FOSAPREPITANT DIMEGLUMINE 500 MILLIGRAM(S): 150 INJECTION, POWDER, LYOPHILIZED, FOR SOLUTION INTRAVENOUS at 12:50

## 2023-04-27 RX ADMIN — GEMCITABINE 1650 MILLIGRAM(S): 38 INJECTION, SOLUTION INTRAVENOUS at 13:55

## 2023-04-27 RX ADMIN — Medication 300 UNIT(S): at 17:10

## 2023-04-27 RX ADMIN — PALONOSETRON HYDROCHLORIDE 0.25 MILLIGRAM(S): 0.25 INJECTION, SOLUTION INTRAVENOUS at 13:25

## 2023-04-27 RX ADMIN — Medication 10 MILLIGRAM(S): at 12:50

## 2023-04-27 RX ADMIN — SODIUM CHLORIDE 1000 MILLILITER(S): 9 INJECTION INTRAMUSCULAR; INTRAVENOUS; SUBCUTANEOUS at 11:35

## 2023-04-27 RX ADMIN — CISPLATIN 115 MILLIGRAM(S): 1 INJECTION, SOLUTION INTRAVENOUS at 14:55

## 2023-04-27 RX ADMIN — Medication 204 MILLIGRAM(S): at 12:35

## 2023-04-27 RX ADMIN — GEMCITABINE 1650 MILLIGRAM(S): 38 INJECTION, SOLUTION INTRAVENOUS at 13:25

## 2023-04-27 RX ADMIN — SODIUM CHLORIDE 1000 MILLILITER(S): 9 INJECTION, SOLUTION INTRAVENOUS at 17:00

## 2023-04-27 RX ADMIN — SODIUM CHLORIDE 507 MILLILITER(S): 9 INJECTION, SOLUTION INTRAVENOUS at 14:55

## 2023-04-27 RX ADMIN — SODIUM CHLORIDE 1000 MILLILITER(S): 9 INJECTION INTRAMUSCULAR; INTRAVENOUS; SUBCUTANEOUS at 12:35

## 2023-04-27 RX ADMIN — CISPLATIN 115 MILLIGRAM(S): 1 INJECTION, SOLUTION INTRAVENOUS at 13:55

## 2023-05-02 ENCOUNTER — APPOINTMENT (OUTPATIENT)
Dept: INTERVENTIONAL RADIOLOGY/VASCULAR | Facility: HOSPITAL | Age: 68
End: 2023-05-02

## 2023-05-04 ENCOUNTER — LABORATORY RESULT (OUTPATIENT)
Age: 68
End: 2023-05-04

## 2023-05-04 ENCOUNTER — NON-APPOINTMENT (OUTPATIENT)
Age: 68
End: 2023-05-04

## 2023-05-04 ENCOUNTER — OUTPATIENT (OUTPATIENT)
Dept: OUTPATIENT SERVICES | Facility: HOSPITAL | Age: 68
LOS: 1 days | End: 2023-05-04
Payer: MEDICARE

## 2023-05-04 ENCOUNTER — APPOINTMENT (OUTPATIENT)
Dept: HEMATOLOGY ONCOLOGY | Facility: CLINIC | Age: 68
End: 2023-05-04
Payer: MEDICARE

## 2023-05-04 ENCOUNTER — APPOINTMENT (OUTPATIENT)
Dept: UROLOGY | Facility: CLINIC | Age: 68
End: 2023-05-04

## 2023-05-04 ENCOUNTER — APPOINTMENT (OUTPATIENT)
Dept: INFUSION THERAPY | Facility: CLINIC | Age: 68
End: 2023-05-04

## 2023-05-04 VITALS
TEMPERATURE: 98 F | HEIGHT: 63 IN | RESPIRATION RATE: 18 BRPM | WEIGHT: 138.01 LBS | HEART RATE: 71 BPM | OXYGEN SATURATION: 98 % | SYSTOLIC BLOOD PRESSURE: 159 MMHG | DIASTOLIC BLOOD PRESSURE: 54 MMHG

## 2023-05-04 VITALS
TEMPERATURE: 98 F | OXYGEN SATURATION: 98 % | SYSTOLIC BLOOD PRESSURE: 125 MMHG | RESPIRATION RATE: 18 BRPM | DIASTOLIC BLOOD PRESSURE: 75 MMHG | HEART RATE: 71 BPM

## 2023-05-04 VITALS
HEART RATE: 71 BPM | SYSTOLIC BLOOD PRESSURE: 159 MMHG | BODY MASS INDEX: 24.45 KG/M2 | RESPIRATION RATE: 18 BRPM | WEIGHT: 138 LBS | TEMPERATURE: 97.7 F | HEIGHT: 63 IN | DIASTOLIC BLOOD PRESSURE: 84 MMHG | OXYGEN SATURATION: 98 %

## 2023-05-04 DIAGNOSIS — C68.0 MALIGNANT NEOPLASM OF URETHRA: ICD-10-CM

## 2023-05-04 DIAGNOSIS — N75.0 CYST OF BARTHOLIN'S GLAND: Chronic | ICD-10-CM

## 2023-05-04 LAB
ALBUMIN SERPL ELPH-MCNC: 3.8 G/DL
ALP BLD-CCNC: 74 U/L
ALT SERPL-CCNC: 27 U/L
ANION GAP SERPL CALC-SCNC: 10 MMOL/L
AST SERPL-CCNC: 26 U/L
BILIRUB SERPL-MCNC: 0.4 MG/DL
BUN SERPL-MCNC: 10 MG/DL
CALCIUM SERPL-MCNC: 9.6 MG/DL
CHLORIDE SERPL-SCNC: 105 MMOL/L
CO2 SERPL-SCNC: 28 MMOL/L
CREAT SERPL-MCNC: 0.6 MG/DL
EGFR: 96 ML/MIN/1.73M2
GLUCOSE SERPL-MCNC: 93 MG/DL
MAGNESIUM SERPL-MCNC: 2.3 MG/DL
POTASSIUM SERPL-SCNC: 3.7 MMOL/L
PROT SERPL-MCNC: 7.3 G/DL
SODIUM SERPL-SCNC: 143 MMOL/L

## 2023-05-04 PROCEDURE — 96413 CHEMO IV INFUSION 1 HR: CPT

## 2023-05-04 PROCEDURE — 96375 TX/PRO/DX INJ NEW DRUG ADDON: CPT

## 2023-05-04 PROCEDURE — 99214 OFFICE O/P EST MOD 30 MIN: CPT

## 2023-05-04 RX ORDER — DEXAMETHASONE 0.5 MG/5ML
10 ELIXIR ORAL ONCE
Refills: 0 | Status: COMPLETED | OUTPATIENT
Start: 2023-05-04 | End: 2023-05-04

## 2023-05-04 RX ORDER — GEMCITABINE 38 MG/ML
1650 INJECTION, SOLUTION INTRAVENOUS ONCE
Refills: 0 | Status: COMPLETED | OUTPATIENT
Start: 2023-05-04 | End: 2023-05-04

## 2023-05-04 RX ORDER — PALONOSETRON HYDROCHLORIDE 0.25 MG/5ML
0.25 INJECTION, SOLUTION INTRAVENOUS ONCE
Refills: 0 | Status: COMPLETED | OUTPATIENT
Start: 2023-05-04 | End: 2023-05-04

## 2023-05-04 RX ORDER — AMOXICILLIN 875 MG/1
TABLET, FILM COATED ORAL
Refills: 0 | Status: DISCONTINUED | COMMUNITY
End: 2023-05-04

## 2023-05-04 RX ADMIN — GEMCITABINE 1650 MILLIGRAM(S): 38 INJECTION, SOLUTION INTRAVENOUS at 12:57

## 2023-05-04 RX ADMIN — PALONOSETRON HYDROCHLORIDE 0.25 MILLIGRAM(S): 0.25 INJECTION, SOLUTION INTRAVENOUS at 12:42

## 2023-05-04 RX ADMIN — GEMCITABINE 1650 MILLIGRAM(S): 38 INJECTION, SOLUTION INTRAVENOUS at 13:25

## 2023-05-04 RX ADMIN — Medication 10 MILLIGRAM(S): at 12:45

## 2023-05-04 RX ADMIN — Medication 204 MILLIGRAM(S): at 12:30

## 2023-05-04 NOTE — PHYSICAL EXAM
[Restricted in physically strenuous activity but ambulatory and able to carry out work of a light or sedentary nature] : Status 1- Restricted in physically strenuous activity but ambulatory and able to carry out work of a light or sedentary nature, e.g., light house work, office work [Normal] : affect appropriate [de-identified] : chemoport in place c/d/i [de-identified] : Farrell catheter in place with clear yellow urine

## 2023-05-04 NOTE — ASSESSMENT
[FreeTextEntry1] : Sandra Alston is a 68 year old female who presented with gross hematuria and urinary retention.  CT urogram showed a 4 cm tumor in the bladder and extending into the mid urethra.  No lymphadenopathy or distant metastatic disease on CT c/a/p.  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  Initiated neoadjuvant chemotherapy with gemcitabine and cisplatin 4/27/23. \par \par Plan:\par 1.  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  Prognosis was also reviewed;  this is a potentially curable malignancy\par --Standard of care for muscle invasive bladder cancer is neoadjuvant chemotherapy.  Based on her age, functional status, comorbid conditions - recommended treatment with gemcitabine and cisplatin x 4 cycles (12 weeks) if tolerated.  \par - Initiated gem/cis 4/27/23\par --s/p IR port placement 4/25/23\par -- Labs reviewed, patient clear for C1D8 of chemo today\par \par 2.  urinary retention\par --dubose in place, has f/u with Dr Moulton 5/2023 for change.\par - Will have patient visit office today for dubose evaluation given leakage\par \par 3.  MGUS - history noted, follows with hematologist on [place].  defer further w/u for now.\par \par 4.  Iron deficiency \par - on PO iron\par - repeat studies next visit, consider IV iron if still deficient.\par \par C2 day 1 on 5/18/23 (labs on 5/16)\par RTC for C2, day 8 on 5/25/23\par with CBC, CMP, mag, ferritin, iron panel

## 2023-05-04 NOTE — HISTORY OF PRESENT ILLNESS
[de-identified] : Sandra Alston is a 68 year old here referred by Dr. Moulton for evaluation for urothelial carcinoma involving bladder and urethra\par \par Oncologic history:\par 1.  urothelial carcinoma involving bladder and urethra\par --presented with hematuria and urinary retention\par --CT scan urogram 3/8/23 showed a 4.5 cm thick walled, rim enhancing mass with necrosis at the level of the urethra extending close to the L ureteral orifice.  No lymphadenopathy.  no visceral metastatic disease\par --cystoscopy 3/9/23 showed a flat solid appearing tumor at the L trigone and Lateral wall of the bladder extending into the urethra\par --TURBT 3/22/23:  biopsies from L bladder neck, L trigone, anterior bladder neck, and bladder neck all showing high grade, muscle invasive urothelial carcinoma.\par --chest CT 4/14/23 negative for thoracic metastatic disease\par --neoadjuvant chemotherapy with cisplatin and gemcitabine 4/27/23 - \par \par CT urogram also showed a thickened endometrial stripe - patient reports this is a known finding and an endometrial biopsy with a gynecologist last year that was normal/negative.\par \par PMH also notable for MGUS - no records - follows with Dr Brown on [place].  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 She has had some leakage from her catheter after it was pulled by mistake while cooking. She reports fatigue on day 3 after chemo that improved with rest. She had an episode of nausea that improved after zofran and episode of headache that improved after tylenol. She will delay her hair dyeing until after chemo. Is taking multivitamin and iron.\par \par

## 2023-05-04 NOTE — END OF VISIT
[] : Fellow [FreeTextEntry3] : I evaluated the patient with the Oncology fellow, Dr Delaney.  tolerated C1, day 1 of gem/cis.  proceeding w/ C1, day 8 today.  Farrell change for leakage.  Monitor tinnitus.  Re-evaluate iron stores for next office visit.  Return precautions reviewed

## 2023-05-04 NOTE — REVIEW OF SYSTEMS
[Fatigue] : fatigue [Dysuria] : dysuria [Negative] : Heme/Lymph [Fever] : no fever [Chills] : no chills [Night Sweats] : no night sweats [Recent Change In Weight] : ~T no recent weight change [Vaginal Discharge] : no vaginal discharge [Dysmenorrhea/Abn Vaginal Bleeding] : no dysmenorrhea/abnormal vaginal bleeding [FreeTextEntry2] : improved [FreeTextEntry4] : occsaional "humming noise in ears" [FreeTextEntry8] : h/o hematuria, Farrell catheter in place, c/c some leakage and occasional burning pain due to catheter

## 2023-05-11 ENCOUNTER — APPOINTMENT (OUTPATIENT)
Dept: INTERVENTIONAL RADIOLOGY/VASCULAR | Facility: HOSPITAL | Age: 68
End: 2023-05-11

## 2023-05-16 ENCOUNTER — APPOINTMENT (OUTPATIENT)
Dept: HEMATOLOGY ONCOLOGY | Facility: CLINIC | Age: 68
End: 2023-05-16

## 2023-05-16 ENCOUNTER — APPOINTMENT (OUTPATIENT)
Dept: UROLOGY | Facility: CLINIC | Age: 68
End: 2023-05-16
Payer: MEDICARE

## 2023-05-16 VITALS
HEART RATE: 71 BPM | DIASTOLIC BLOOD PRESSURE: 72 MMHG | TEMPERATURE: 98 F | OXYGEN SATURATION: 99 % | SYSTOLIC BLOOD PRESSURE: 152 MMHG

## 2023-05-16 LAB
ALBUMIN SERPL ELPH-MCNC: 4.2 G/DL
ALP BLD-CCNC: 98 U/L
ALT SERPL-CCNC: 16 U/L
ANION GAP SERPL CALC-SCNC: 10 MMOL/L
AST SERPL-CCNC: 17 U/L
BASOPHILS # BLD AUTO: 0.02 K/UL
BASOPHILS NFR BLD AUTO: 0.3 %
BILIRUB SERPL-MCNC: <0.2 MG/DL
BILIRUB UR QL STRIP: NORMAL
BUN SERPL-MCNC: 13 MG/DL
CALCIUM SERPL-MCNC: 9.2 MG/DL
CHLORIDE SERPL-SCNC: 105 MMOL/L
CLARITY UR: CLEAR
CO2 SERPL-SCNC: 26 MMOL/L
COLLECTION METHOD: NORMAL
CREAT SERPL-MCNC: 0.53 MG/DL
EGFR: 99 ML/MIN/1.73M2
EOSINOPHIL # BLD AUTO: 0.05 K/UL
EOSINOPHIL NFR BLD AUTO: 0.8 %
FERRITIN SERPL-MCNC: 56 NG/ML
GLUCOSE SERPL-MCNC: 89 MG/DL
GLUCOSE UR-MCNC: NORMAL
HCG UR QL: 0.2 EU/DL
HCT VFR BLD CALC: 33 %
HGB BLD-MCNC: 10.6 G/DL
HGB UR QL STRIP.AUTO: NORMAL
IMM GRANULOCYTES NFR BLD AUTO: 0.3 %
IRON SATN MFR SERPL: 19 %
IRON SERPL-MCNC: 56 UG/DL
KETONES UR-MCNC: NORMAL
LEUKOCYTE ESTERASE UR QL STRIP: NORMAL
LYMPHOCYTES # BLD AUTO: 2.08 K/UL
LYMPHOCYTES NFR BLD AUTO: 31.4 %
MAGNESIUM SERPL-MCNC: 2.3 MG/DL
MAN DIFF?: NORMAL
MCHC RBC-ENTMCNC: 28 PG
MCHC RBC-ENTMCNC: 32.1 GM/DL
MCV RBC AUTO: 87.3 FL
MONOCYTES # BLD AUTO: 0.85 K/UL
MONOCYTES NFR BLD AUTO: 12.8 %
NEUTROPHILS # BLD AUTO: 3.61 K/UL
NEUTROPHILS NFR BLD AUTO: 54.4 %
NITRITE UR QL STRIP: NORMAL
PH UR STRIP: 7
PLATELET # BLD AUTO: 301 K/UL
POTASSIUM SERPL-SCNC: 4.1 MMOL/L
PROT SERPL-MCNC: 7 G/DL
PROT UR STRIP-MCNC: 30
RBC # BLD: 3.78 M/UL
RBC # FLD: 15.1 %
SODIUM SERPL-SCNC: 141 MMOL/L
SP GR UR STRIP: 1.02
TIBC SERPL-MCNC: 299 UG/DL
UIBC SERPL-MCNC: 243 UG/DL
WBC # FLD AUTO: 6.63 K/UL

## 2023-05-16 PROCEDURE — 99213 OFFICE O/P EST LOW 20 MIN: CPT | Mod: 25

## 2023-05-16 PROCEDURE — 51702 INSERT TEMP BLADDER CATH: CPT

## 2023-05-16 PROCEDURE — 81003 URINALYSIS AUTO W/O SCOPE: CPT | Mod: QW

## 2023-05-16 NOTE — HISTORY OF PRESENT ILLNESS
[FreeTextEntry1] : Language: English\par Date of First visit: 3/9/2023\par Accompanied by: - Pratik\par Contact info: ***\par Referring Provider/PCP: Alondra Van\par [facility] [phone]\par fax [phone]\par \par \par \par CC/ Problem List:\par \par ===============================================================================\par FIRST VISIT:\par The patient was a 68 year female with an extensive smoking history and kidney stones who first presented on 2023 for gross hematuria. \par She first noticed some blood on 3/3/2023 and went to the ER on Saturday for urinary retention. Dubose catheter was placed. PVR 1100 ml. No imaging was done in the ER. Patient was started on Cefpodoxime 200 mg BID for 10 days. She does reports clots and the urine is starting to clear up. Denies fevers, but did have chills. \par \par She is an active smoker. Reports that she has cut down to 4 cigarettes a day over the past year. She has had kidney stones in the past, saw Dr. Rubin had started ER on medication to "break up the stones". Hematuria w/u in 2018 was negative with Dr. Rubin included cysto and CT Urogram. \par \par \par -------------------------------------------------------------------------------------------\par INTERVAL VISITS:\par \par Her cysto showed bladder and urethral abnormalities that almost seemed submucosal\par \par The patient's age today 2023 is 68 year old.\par Please note interval events and changes in PMH, PSH, MEDS and ALLERGIES were reviewed.\par \par On 3/22/2023 she underwent a TURBT of her bladder neck and bladder which revealed at least T2 HG TCC. Interestingly some of the surface mucosa was NOT involved.\par She has been doing well with her dubose. She has not had dysuria/urethral burning. She quit smoking in mid March. She is eating a lot.\par \par On 2023 she passed her TOV but on 2023 th pt had her dubose put back in for retention. They just called her with results saying she had a UTI. She was asymptomatic. \par \par She saw Dr. Banks and was started on neoadjuvant chemo (Silex/Cis) on 2023. \par \par The patient's age today 2023 is 68 year old.\par Please note interval events and changes in PMH, PSH, MEDS and ALLERGIES were reviewed.\par She started her chemo and feels good. She is eating well and is staying active.\par She denies pain (she in fact says her pelvic pain has largely resolved). She gets occasional burning in the genitals but it is helped by water based lube. She also has occasional urine around the catheter but it is mild.\par \par ===============================================================================\par \par PMH: pre-HTN, Sleep apnea, M spike (monoclonal spike)\par PSH: Bartholin's cyst\par POBH: (if applicable) \par FH: Mother had heart disease, Father had bone cancer\par \par ALL: NKDA\par MEDS: Amlodipine, Cymbalta, Clonopin, Crestor, Vit D, MVI, Calcium, Pre-biotic/probiotic, Metamucil, Beet juice\par SOC: Active smoker trying to quit, Denies etOH, denies drugs\par \par \par ROS: Review of Systems is as per HPI unless otherwise denoted below\par \par \par ===============================================================================\par DATA: \par \par LABS:-------------------------------------------------------------------------------------------------------------------\par 3/7/2023: HCT 32.7, sCRe 0.53\par 3/9/2023: Urine culture negative\par \par RADS:-------------------------------------------------------------------------------------------------------------------\par 3/8/2023: CTU\par 4.5 cm irregular thick walled rim-enhancing mass with central cystic/necrotic changes centered at level of urethra (left side near the bladder, moving more anteriorly distally) extending close to left ureteral orifice without hydronephrosis, suspect urothelial malignancy. Recommend cystoscopy and tissue biopsy correlation.\par No evidence of upper tract disease, distant metastases or suspicious lymphadenopathy.\par Thickened endometrial complex, correlate with pathology results given history of recent biopsy.\par films personally reviewed\par \par 2023: NCCT\par Thyroid nodule. (she had those recently checked)\par MIld centrilobular and moderate paraseptal emphysema is most pronounces in the apices. There are associated Blebs. Mild scarring. No discrete nodules. Mild ectasia. No interstitial disease. ADrenals are normal. No bone lesions\par \par \par PATHOLOGY/CYTOLOGY:-------------------------------------------------------------------------------------------\par 3/7/2023: Cytology NM\par \par 3/22/2023: TURBT with bladder neck and bladder sections\par a. Left bladder neck: \par HG TCC with glandular differentiation, T2, no LVI\par Surface involvement not seen\par b Left trigone\par HG TCC with glandular differentiation, T2, no LVI\par c. Anterior bladder neck\par HG TCC, T2, no LVI, surface involvement not seen\par d. Bladder neck biopsy\par HG TCC with glandular differentiation, T2, No LVI, surface involvement not seen\par \par \par VOIDING STUDIES: ----------------------------------------------------------------------------------------------------\par 2023: TOV\par I filled her with about 160cc. She voided into the toilet without straining. She did not leak. Her PVR was 23cc.\par We gave her a dose of Cefuroxime 500mg po.\par \par 2023: Dubose put back in for retention\par \par \par \par STONE STUDIES: (Analysis/LLSA)----------------------------------------------------------------------------------\par \par \par \par PROCEDURES: -----------------------------------------------------------------------------------------------\par 3/9/2023: Cysto\par The patient had a flat solid appearing tumor involving the left trigone and lateral wall potentially going into the urethra.  There was some posterior wall / dome inflammation as well  I could not see either UO. ? Mass in the urethra\par A dubose was replaced after this procedure under aseptic conditions. Clear urine was obtained.\par \par 2023: Cath change\par The pts' catheter was changed aseptically to a new 16Fr Dubose connected to SD.\par Clear urine effluxed out.\par \par ===============================================================================\par \par PHYSICAL EXAM:\par \par GEN: AAOx3, NAD, Habitus: normal\par \par BARRIERS to CARE: none\par \par PSYCH: Appropriate Behavior, Affect Congruent\par \par HEENT: AT/NC Trachea midline. \par \par Lungs: No labored breathing.\par \par NEURO: + Movement, all 4 extremities grossly intact without deficits. No tremors.\par \par SKIN: Warm dry. No visible rashes or ulcers\par \par GAIT: Gait normal, Stability good\par \par  FOCUSED: -------done 3/9/2023------------------------------------------------\par \par Bladder: Nondistended, Nontender, No masses\par \par Ext Genitalia: Normal, Atrophic\par \par Urethra: Normal, no masses, fixed and immobile\par \par Vagina: Normal angle, Short length Length, mass palpated anterior vaginal wall around urethra bladder, this mass is fixed and solid\par \par 5/15/2023: Tight introitus with +pelvic mass. \par \par =======================================================================================\par \par \par ASSESSMENT and PLAN\par \par The patient is a 68 year female with a history of the following:\par \par 1. 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 We changed her dubose today.\par She is on Silex/Cis and is pending a CT in July or August. I will have her RTC for a dubose change in one month but if her CT shows significant disease regression, we may want to consider TOV again.\par \par She understood. She was again given Dubose bag teaching\par \par -----------------------------------------------------------------------------------------------------\par LABS/TESTS Ordered: NCCT CT chest, Med Onc\par Meds Ordered:\par Follow up: 1 month for PVR symptom check\par -----------------------------------------------------------------------------------------------------\par \par The total amount of time I have personally spent preparing for this visit, reviewing the patient's test results, obtaining external history, ordering tests/medications, documenting clinical information, communicating with and counseling the patient/family and/or caregiver(s), and spent face to face with the patient explaining the above was  25 minutes.\par \par Thank you for allowing me to assist in the care of your patient. Should you have any questions please do not hesitate to reach out to me.\par \par \par Hayley Moulton MD\[facility] \[facility] Department of Urology\[facility] Phone: [phone]\[facility] Fax: [phone]\[facility] \[facility] [address] [address] [place]\[facility] [address]\[facility] \[facility]

## 2023-05-17 ENCOUNTER — NON-APPOINTMENT (OUTPATIENT)
Age: 68
End: 2023-05-17

## 2023-05-18 ENCOUNTER — APPOINTMENT (OUTPATIENT)
Dept: INFUSION THERAPY | Facility: CLINIC | Age: 68
End: 2023-05-18

## 2023-05-18 ENCOUNTER — OUTPATIENT (OUTPATIENT)
Dept: OUTPATIENT SERVICES | Facility: HOSPITAL | Age: 68
LOS: 1 days | End: 2023-05-18
Payer: MEDICARE

## 2023-05-18 VITALS
HEART RATE: 74 BPM | SYSTOLIC BLOOD PRESSURE: 131 MMHG | RESPIRATION RATE: 16 BRPM | WEIGHT: 141.1 LBS | OXYGEN SATURATION: 99 % | TEMPERATURE: 98 F | DIASTOLIC BLOOD PRESSURE: 66 MMHG | HEIGHT: 63 IN

## 2023-05-18 VITALS
OXYGEN SATURATION: 98 % | HEART RATE: 76 BPM | TEMPERATURE: 98 F | SYSTOLIC BLOOD PRESSURE: 130 MMHG | DIASTOLIC BLOOD PRESSURE: 72 MMHG | RESPIRATION RATE: 17 BRPM

## 2023-05-18 DIAGNOSIS — N75.0 CYST OF BARTHOLIN'S GLAND: Chronic | ICD-10-CM

## 2023-05-18 DIAGNOSIS — C68.0 MALIGNANT NEOPLASM OF URETHRA: ICD-10-CM

## 2023-05-18 PROCEDURE — 96417 CHEMO IV INFUS EACH ADDL SEQ: CPT

## 2023-05-18 PROCEDURE — 96413 CHEMO IV INFUSION 1 HR: CPT

## 2023-05-18 PROCEDURE — 96375 TX/PRO/DX INJ NEW DRUG ADDON: CPT

## 2023-05-18 PROCEDURE — 96367 TX/PROPH/DG ADDL SEQ IV INF: CPT

## 2023-05-18 RX ORDER — FOSAPREPITANT DIMEGLUMINE 150 MG/5ML
150 INJECTION, POWDER, LYOPHILIZED, FOR SOLUTION INTRAVENOUS ONCE
Refills: 0 | Status: COMPLETED | OUTPATIENT
Start: 2023-05-18 | End: 2023-05-18

## 2023-05-18 RX ORDER — GEMCITABINE 38 MG/ML
1650 INJECTION, SOLUTION INTRAVENOUS ONCE
Refills: 0 | Status: COMPLETED | OUTPATIENT
Start: 2023-05-18 | End: 2023-05-18

## 2023-05-18 RX ORDER — PALONOSETRON HYDROCHLORIDE 0.25 MG/5ML
0.25 INJECTION, SOLUTION INTRAVENOUS ONCE
Refills: 0 | Status: COMPLETED | OUTPATIENT
Start: 2023-05-18 | End: 2023-05-18

## 2023-05-18 RX ORDER — DEXAMETHASONE 0.5 MG/5ML
10 ELIXIR ORAL ONCE
Refills: 0 | Status: COMPLETED | OUTPATIENT
Start: 2023-05-18 | End: 2023-05-18

## 2023-05-18 RX ORDER — CISPLATIN 1 MG/ML
115 INJECTION, SOLUTION INTRAVENOUS ONCE
Refills: 0 | Status: COMPLETED | OUTPATIENT
Start: 2023-05-18 | End: 2023-05-18

## 2023-05-18 RX ORDER — SODIUM CHLORIDE 9 MG/ML
1000 INJECTION INTRAMUSCULAR; INTRAVENOUS; SUBCUTANEOUS ONCE
Refills: 0 | Status: COMPLETED | OUTPATIENT
Start: 2023-05-18 | End: 2023-05-18

## 2023-05-18 RX ORDER — ACETAMINOPHEN 500 MG
650 TABLET ORAL ONCE
Refills: 0 | Status: COMPLETED | OUTPATIENT
Start: 2023-05-18 | End: 2023-05-18

## 2023-05-18 RX ADMIN — SODIUM CHLORIDE 507 MILLILITER(S): 9 INJECTION, SOLUTION INTRAVENOUS at 15:17

## 2023-05-18 RX ADMIN — SODIUM CHLORIDE 1000 MILLILITER(S): 9 INJECTION INTRAMUSCULAR; INTRAVENOUS; SUBCUTANEOUS at 12:15

## 2023-05-18 RX ADMIN — CISPLATIN 115 MILLIGRAM(S): 1 INJECTION, SOLUTION INTRAVENOUS at 13:16

## 2023-05-18 RX ADMIN — FOSAPREPITANT DIMEGLUMINE 500 MILLIGRAM(S): 150 INJECTION, POWDER, LYOPHILIZED, FOR SOLUTION INTRAVENOUS at 12:25

## 2023-05-18 RX ADMIN — Medication 300 UNIT(S): at 16:40

## 2023-05-18 RX ADMIN — Medication 650 MILLIGRAM(S): at 15:00

## 2023-05-18 RX ADMIN — Medication 10 MILLIGRAM(S): at 13:15

## 2023-05-18 RX ADMIN — GEMCITABINE 1650 MILLIGRAM(S): 38 INJECTION, SOLUTION INTRAVENOUS at 14:20

## 2023-05-18 RX ADMIN — GEMCITABINE 1650 MILLIGRAM(S): 38 INJECTION, SOLUTION INTRAVENOUS at 14:50

## 2023-05-18 RX ADMIN — Medication 650 MILLIGRAM(S): at 15:30

## 2023-05-18 RX ADMIN — Medication 204 MILLIGRAM(S): at 13:00

## 2023-05-18 RX ADMIN — CISPLATIN 115 MILLIGRAM(S): 1 INJECTION, SOLUTION INTRAVENOUS at 14:16

## 2023-05-18 RX ADMIN — SODIUM CHLORIDE 1000 MILLILITER(S): 9 INJECTION, SOLUTION INTRAVENOUS at 16:35

## 2023-05-18 RX ADMIN — PALONOSETRON HYDROCHLORIDE 0.25 MILLIGRAM(S): 0.25 INJECTION, SOLUTION INTRAVENOUS at 12:20

## 2023-05-18 RX ADMIN — FOSAPREPITANT DIMEGLUMINE 150 MILLIGRAM(S): 150 INJECTION, POWDER, LYOPHILIZED, FOR SOLUTION INTRAVENOUS at 12:55

## 2023-05-18 RX ADMIN — SODIUM CHLORIDE 1000 MILLILITER(S): 9 INJECTION INTRAMUSCULAR; INTRAVENOUS; SUBCUTANEOUS at 13:15

## 2023-05-25 ENCOUNTER — APPOINTMENT (OUTPATIENT)
Dept: INFUSION THERAPY | Facility: CLINIC | Age: 68
End: 2023-05-25

## 2023-05-25 ENCOUNTER — OUTPATIENT (OUTPATIENT)
Dept: OUTPATIENT SERVICES | Facility: HOSPITAL | Age: 68
LOS: 1 days | End: 2023-05-25
Payer: MEDICARE

## 2023-05-25 ENCOUNTER — LABORATORY RESULT (OUTPATIENT)
Age: 68
End: 2023-05-25

## 2023-05-25 ENCOUNTER — APPOINTMENT (OUTPATIENT)
Dept: HEMATOLOGY ONCOLOGY | Facility: CLINIC | Age: 68
End: 2023-05-25
Payer: MEDICARE

## 2023-05-25 VITALS
DIASTOLIC BLOOD PRESSURE: 84 MMHG | TEMPERATURE: 98.1 F | SYSTOLIC BLOOD PRESSURE: 156 MMHG | HEART RATE: 74 BPM | BODY MASS INDEX: 24.8 KG/M2 | OXYGEN SATURATION: 99 % | HEIGHT: 63 IN | RESPIRATION RATE: 18 BRPM | WEIGHT: 140 LBS

## 2023-05-25 VITALS
WEIGHT: 139.99 LBS | SYSTOLIC BLOOD PRESSURE: 156 MMHG | DIASTOLIC BLOOD PRESSURE: 84 MMHG | RESPIRATION RATE: 17 BRPM | OXYGEN SATURATION: 99 % | HEART RATE: 74 BPM | HEIGHT: 63 IN | TEMPERATURE: 98 F

## 2023-05-25 DIAGNOSIS — N75.0 CYST OF BARTHOLIN'S GLAND: Chronic | ICD-10-CM

## 2023-05-25 DIAGNOSIS — C68.0 MALIGNANT NEOPLASM OF URETHRA: ICD-10-CM

## 2023-05-25 LAB
ALBUMIN SERPL ELPH-MCNC: 3.9 G/DL
ALP BLD-CCNC: 93 U/L
ALT SERPL-CCNC: 22 U/L
ANION GAP SERPL CALC-SCNC: 1 MMOL/L
AST SERPL-CCNC: 25 U/L
BILIRUB SERPL-MCNC: 0.4 MG/DL
BUN SERPL-MCNC: 8 MG/DL
CALCIUM SERPL-MCNC: 10.1 MG/DL
CHLORIDE SERPL-SCNC: 106 MMOL/L
CO2 SERPL-SCNC: 28 MMOL/L
CREAT SERPL-MCNC: 0.5 MG/DL
EGFR: 101 ML/MIN/1.73M2
GLUCOSE SERPL-MCNC: 88 MG/DL
MAGNESIUM SERPL-MCNC: 2.3 MG/DL
POTASSIUM SERPL-SCNC: 4.3 MMOL/L
PROT SERPL-MCNC: 7.2 G/DL
SODIUM SERPL-SCNC: 135 MMOL/L

## 2023-05-25 PROCEDURE — 96375 TX/PRO/DX INJ NEW DRUG ADDON: CPT

## 2023-05-25 PROCEDURE — 99214 OFFICE O/P EST MOD 30 MIN: CPT | Mod: 25

## 2023-05-25 PROCEDURE — 36415 COLL VENOUS BLD VENIPUNCTURE: CPT

## 2023-05-25 PROCEDURE — 96413 CHEMO IV INFUSION 1 HR: CPT

## 2023-05-25 RX ORDER — GEMCITABINE 38 MG/ML
1650 INJECTION, SOLUTION INTRAVENOUS ONCE
Refills: 0 | Status: COMPLETED | OUTPATIENT
Start: 2023-05-25 | End: 2023-05-25

## 2023-05-25 RX ORDER — DEXAMETHASONE 0.5 MG/5ML
10 ELIXIR ORAL ONCE
Refills: 0 | Status: COMPLETED | OUTPATIENT
Start: 2023-05-25 | End: 2023-05-25

## 2023-05-25 RX ORDER — PALONOSETRON HYDROCHLORIDE 0.25 MG/5ML
0.25 INJECTION, SOLUTION INTRAVENOUS ONCE
Refills: 0 | Status: COMPLETED | OUTPATIENT
Start: 2023-05-25 | End: 2023-05-25

## 2023-05-25 RX ADMIN — GEMCITABINE 1650 MILLIGRAM(S): 38 INJECTION, SOLUTION INTRAVENOUS at 12:35

## 2023-05-25 RX ADMIN — GEMCITABINE 1650 MILLIGRAM(S): 38 INJECTION, SOLUTION INTRAVENOUS at 12:05

## 2023-05-25 RX ADMIN — Medication 204 MILLIGRAM(S): at 11:40

## 2023-05-25 RX ADMIN — Medication 10 MILLIGRAM(S): at 11:55

## 2023-05-25 RX ADMIN — Medication 300 UNIT(S): at 12:40

## 2023-05-25 RX ADMIN — PALONOSETRON HYDROCHLORIDE 0.25 MILLIGRAM(S): 0.25 INJECTION, SOLUTION INTRAVENOUS at 12:03

## 2023-05-25 NOTE — HISTORY OF PRESENT ILLNESS
[de-identified] : Sandra Alston is a 68 year old here referred by Dr. Moulton for evaluation for urothelial carcinoma involving bladder and urethra.  Today here for f/u with C2D8 chemo. \par \par Oncologic history:\par 1.  urothelial carcinoma involving bladder and urethra\par --presented with hematuria and urinary retention\par --CT scan urogram 3/8/23 showed a 4.5 cm thick walled, rim enhancing mass with necrosis at the level of the urethra extending close to the L ureteral orifice.  No lymphadenopathy.  no visceral metastatic disease\par --cystoscopy 3/9/23 showed a flat solid appearing tumor at the L trigone and Lateral wall of the bladder extending into the urethra\par --TURBT 3/22/23:  biopsies from L bladder neck, L trigone, anterior bladder neck, and bladder neck all showing high grade, muscle invasive urothelial carcinoma.\par --chest CT 4/14/23 negative for thoracic metastatic disease\par --neoadjuvant chemotherapy with cisplatin and gemcitabine 4/27/23 - \par \par CT urogram also showed a thickened endometrial stripe - patient reports this is a known finding and an endometrial biopsy with a gynecologist last year that was normal/negative.\par \par PMH also notable for MGUS - no records - follows with Dr Brown on [place].  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 She had low energy with fatigue after the last treatment now feels ok.  She reports scalp itching, especially worse at night, occasional headache and humming noise in ears(improved than before),  denies nausea, vomiting, constipation, diarrhea, hearing loss, blood in urine or stool, or abdominal pain.

## 2023-05-25 NOTE — REVIEW OF SYSTEMS
[Fatigue] : fatigue [Dysuria] : dysuria [Negative] : Heme/Lymph [Fever] : no fever [Chills] : no chills [Night Sweats] : no night sweats [Recent Change In Weight] : ~T no recent weight change [Vaginal Discharge] : no vaginal discharge [Dysmenorrhea/Abn Vaginal Bleeding] : no dysmenorrhea/abnormal vaginal bleeding [Skin Rash] : no skin rash [Skin Wound] : no skin wound [Confused] : no confusion [Dizziness] : no dizziness [Fainting] : no fainting [Difficulty Walking] : no difficulty walking [FreeTextEntry4] : occasional "humming noise in ears"- improved  [FreeTextEntry8] : h/o hematuria, dubose catheter in place [de-identified] : itching in scalp [de-identified] : occasional headache

## 2023-05-25 NOTE — PHYSICAL EXAM
[Restricted in physically strenuous activity but ambulatory and able to carry out work of a light or sedentary nature] : Status 1- Restricted in physically strenuous activity but ambulatory and able to carry out work of a light or sedentary nature, e.g., light house work, office work [Normal] : affect appropriate [de-identified] : chemoport in place, no sign of infection  [de-identified] : Farrell catheter in place with clear yellow urine   [de-identified] :  no rash or lesion in  scalp

## 2023-05-25 NOTE — ASSESSMENT
[FreeTextEntry1] : Sandra Alston is a 68 year old female who presented with gross hematuria and urinary retention.  CT urogram showed a 4 cm tumor in the bladder and extending into the mid urethra.  No lymphadenopathy or distant metastatic disease on CT c/a/p.  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  Initiated neoadjuvant chemotherapy with gemcitabine and cisplatin 4/27/23. \par \par Plan:\par 1. 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  Prognosis was also reviewed;  this is a potentially curable malignancy\par --Standard of care for muscle invasive bladder cancer is neoadjuvant chemotherapy.  Based on her age, functional status, comorbid conditions - recommended treatment with gemcitabine and cisplatin x 4 cycles (12 weeks) if tolerated.  \par - Initiated gem/cis 4/27/23, tolerating chemo well with manageable toxicity, lab reviewed and ok to proceed with C2D8. \par --s/p IR port placement 4/25/23\par \par 2. urinary retention\par --dubose in place, has f/u with Dr Moulton 6/20/23 for change.\par \par 3.  MGUS - history noted, follows with hematologist on [place].  defer further w/u for now.\par \par 4. h/o  Iron deficiency \par --on PO iron, Ferritin/Iron/TIBC WNL on 5/16/23\par --can stop PO iron if constipation persists. \par \par 5. scalp itching\par --On exam, no rash, discharge or lesions in scalp\par --could be related to hair falling\par --monitor now. \par \par I saw and evaluated the patient with Dr. Piedra\par C3D1 on 6/8\par RTC with C3 day 8 on 6/15/23\par with CBC, CMP, mag

## 2023-06-02 NOTE — ASU PREOP CHECKLIST - INTERNAL PROSTHESES
needs refill on ppi and h2 blocker due to gerd from her hiatal hernia had recurrent trever, pillcam showed avm Had the push enteroscopy IOL b/l; Farrell catheter with leg bag/yes(specify)

## 2023-06-08 ENCOUNTER — APPOINTMENT (OUTPATIENT)
Dept: INFUSION THERAPY | Facility: CLINIC | Age: 68
End: 2023-06-08

## 2023-06-08 ENCOUNTER — NON-APPOINTMENT (OUTPATIENT)
Age: 68
End: 2023-06-08

## 2023-06-08 ENCOUNTER — OUTPATIENT (OUTPATIENT)
Dept: OUTPATIENT SERVICES | Facility: HOSPITAL | Age: 68
LOS: 1 days | End: 2023-06-08
Payer: MEDICARE

## 2023-06-08 VITALS
SYSTOLIC BLOOD PRESSURE: 127 MMHG | DIASTOLIC BLOOD PRESSURE: 82 MMHG | HEIGHT: 63 IN | HEART RATE: 71 BPM | OXYGEN SATURATION: 99 % | TEMPERATURE: 98 F | WEIGHT: 145.95 LBS | RESPIRATION RATE: 18 BRPM

## 2023-06-08 VITALS
HEART RATE: 72 BPM | TEMPERATURE: 97 F | SYSTOLIC BLOOD PRESSURE: 135 MMHG | OXYGEN SATURATION: 98 % | RESPIRATION RATE: 16 BRPM | DIASTOLIC BLOOD PRESSURE: 83 MMHG

## 2023-06-08 DIAGNOSIS — N75.0 CYST OF BARTHOLIN'S GLAND: Chronic | ICD-10-CM

## 2023-06-08 DIAGNOSIS — C68.0 MALIGNANT NEOPLASM OF URETHRA: ICD-10-CM

## 2023-06-08 LAB
ALBUMIN SERPL ELPH-MCNC: 3.7 G/DL — SIGNIFICANT CHANGE UP (ref 3.3–5)
ALP SERPL-CCNC: 81 U/L — SIGNIFICANT CHANGE UP (ref 40–120)
ALT FLD-CCNC: 26 U/L — SIGNIFICANT CHANGE UP (ref 10–45)
ANION GAP SERPL CALC-SCNC: 7 MMOL/L — SIGNIFICANT CHANGE UP (ref 5–17)
AST SERPL-CCNC: 27 U/L — SIGNIFICANT CHANGE UP (ref 10–40)
BILIRUB SERPL-MCNC: 0.4 MG/DL — SIGNIFICANT CHANGE UP (ref 0.2–1.2)
BUN SERPL-MCNC: 10 MG/DL — SIGNIFICANT CHANGE UP (ref 7–23)
CALCIUM SERPL-MCNC: 9.8 MG/DL — SIGNIFICANT CHANGE UP (ref 8.4–10.5)
CHLORIDE SERPL-SCNC: 106 MMOL/L — SIGNIFICANT CHANGE UP (ref 96–108)
CO2 SERPL-SCNC: 30 MMOL/L — SIGNIFICANT CHANGE UP (ref 22–31)
CREAT SERPL-MCNC: 0.8 MG/DL — SIGNIFICANT CHANGE UP (ref 0.5–1.3)
EGFR: 79 ML/MIN/1.73M2 — SIGNIFICANT CHANGE UP
GLUCOSE SERPL-MCNC: 90 MG/DL — SIGNIFICANT CHANGE UP (ref 70–99)
HCT VFR BLD CALC: 32.7 % — LOW (ref 34.5–45)
HGB BLD-MCNC: 10.5 G/DL — LOW (ref 11.5–15.5)
LYMPHOCYTES # BLD AUTO: 2.3 K/UL — SIGNIFICANT CHANGE UP (ref 1–3.3)
LYMPHOCYTES # BLD AUTO: 33.5 % — SIGNIFICANT CHANGE UP (ref 13–44)
MAGNESIUM SERPL-MCNC: 2.3 MG/DL — SIGNIFICANT CHANGE UP (ref 1.6–2.6)
MCHC RBC-ENTMCNC: 28 PG — SIGNIFICANT CHANGE UP (ref 27–34)
MCHC RBC-ENTMCNC: 32.1 GM/DL — SIGNIFICANT CHANGE UP (ref 32–36)
MCV RBC AUTO: 87.2 FL — SIGNIFICANT CHANGE UP (ref 80–100)
NEUTROPHILS # BLD AUTO: 3.7 K/UL — SIGNIFICANT CHANGE UP (ref 1.8–7.4)
NEUTROPHILS NFR BLD AUTO: 52.4 % — SIGNIFICANT CHANGE UP (ref 43–77)
PLATELET # BLD AUTO: 225 K/UL — SIGNIFICANT CHANGE UP (ref 150–400)
POTASSIUM SERPL-MCNC: 3.8 MMOL/L — SIGNIFICANT CHANGE UP (ref 3.5–5.3)
POTASSIUM SERPL-SCNC: 3.8 MMOL/L — SIGNIFICANT CHANGE UP (ref 3.5–5.3)
PROT SERPL-MCNC: 7.1 G/DL — SIGNIFICANT CHANGE UP (ref 6–8.3)
RBC # BLD: 3.75 M/UL — LOW (ref 3.8–5.2)
RBC # FLD: 18.3 % — HIGH (ref 10.3–14.5)
SODIUM SERPL-SCNC: 143 MMOL/L — SIGNIFICANT CHANGE UP (ref 135–145)
WBC # BLD: 7 K/UL — SIGNIFICANT CHANGE UP (ref 3.8–10.5)
WBC # FLD AUTO: 7 K/UL — SIGNIFICANT CHANGE UP (ref 3.8–10.5)

## 2023-06-08 PROCEDURE — 80053 COMPREHEN METABOLIC PANEL: CPT

## 2023-06-08 PROCEDURE — 96417 CHEMO IV INFUS EACH ADDL SEQ: CPT

## 2023-06-08 PROCEDURE — 96375 TX/PRO/DX INJ NEW DRUG ADDON: CPT

## 2023-06-08 PROCEDURE — 36415 COLL VENOUS BLD VENIPUNCTURE: CPT

## 2023-06-08 PROCEDURE — 85025 COMPLETE CBC W/AUTO DIFF WBC: CPT

## 2023-06-08 PROCEDURE — 83735 ASSAY OF MAGNESIUM: CPT

## 2023-06-08 PROCEDURE — 96367 TX/PROPH/DG ADDL SEQ IV INF: CPT

## 2023-06-08 PROCEDURE — 96413 CHEMO IV INFUSION 1 HR: CPT

## 2023-06-08 RX ORDER — SODIUM CHLORIDE 9 MG/ML
1000 INJECTION, SOLUTION INTRAVENOUS
Refills: 0 | Status: COMPLETED | OUTPATIENT
Start: 2023-06-08 | End: 2023-06-08

## 2023-06-08 RX ORDER — FOSAPREPITANT DIMEGLUMINE 150 MG/5ML
150 INJECTION, POWDER, LYOPHILIZED, FOR SOLUTION INTRAVENOUS ONCE
Refills: 0 | Status: COMPLETED | OUTPATIENT
Start: 2023-06-08 | End: 2023-06-08

## 2023-06-08 RX ORDER — SODIUM CHLORIDE 9 MG/ML
1000 INJECTION INTRAMUSCULAR; INTRAVENOUS; SUBCUTANEOUS ONCE
Refills: 0 | Status: COMPLETED | OUTPATIENT
Start: 2023-06-08 | End: 2023-06-08

## 2023-06-08 RX ORDER — GEMCITABINE 38 MG/ML
1650 INJECTION, SOLUTION INTRAVENOUS ONCE
Refills: 0 | Status: COMPLETED | OUTPATIENT
Start: 2023-06-08 | End: 2023-06-08

## 2023-06-08 RX ORDER — PALONOSETRON HYDROCHLORIDE 0.25 MG/5ML
0.25 INJECTION, SOLUTION INTRAVENOUS ONCE
Refills: 0 | Status: COMPLETED | OUTPATIENT
Start: 2023-06-08 | End: 2023-06-08

## 2023-06-08 RX ORDER — CISPLATIN 1 MG/ML
115 INJECTION, SOLUTION INTRAVENOUS ONCE
Refills: 0 | Status: COMPLETED | OUTPATIENT
Start: 2023-06-08 | End: 2023-06-08

## 2023-06-08 RX ORDER — DEXAMETHASONE 0.5 MG/5ML
10 ELIXIR ORAL ONCE
Refills: 0 | Status: COMPLETED | OUTPATIENT
Start: 2023-06-08 | End: 2023-06-08

## 2023-06-08 RX ADMIN — PALONOSETRON HYDROCHLORIDE 0.25 MILLIGRAM(S): 0.25 INJECTION, SOLUTION INTRAVENOUS at 10:04

## 2023-06-08 RX ADMIN — FOSAPREPITANT DIMEGLUMINE 150 MILLIGRAM(S): 150 INJECTION, POWDER, LYOPHILIZED, FOR SOLUTION INTRAVENOUS at 11:21

## 2023-06-08 RX ADMIN — SODIUM CHLORIDE 1000 MILLILITER(S): 9 INJECTION, SOLUTION INTRAVENOUS at 15:24

## 2023-06-08 RX ADMIN — GEMCITABINE 1650 MILLIGRAM(S): 38 INJECTION, SOLUTION INTRAVENOUS at 13:25

## 2023-06-08 RX ADMIN — Medication 10 MILLIGRAM(S): at 10:51

## 2023-06-08 RX ADMIN — SODIUM CHLORIDE 1000 MILLILITER(S): 9 INJECTION INTRAMUSCULAR; INTRAVENOUS; SUBCUTANEOUS at 09:48

## 2023-06-08 RX ADMIN — SODIUM CHLORIDE 507 MILLILITER(S): 9 INJECTION, SOLUTION INTRAVENOUS at 13:26

## 2023-06-08 RX ADMIN — CISPLATIN 115 MILLIGRAM(S): 1 INJECTION, SOLUTION INTRAVENOUS at 11:42

## 2023-06-08 RX ADMIN — SODIUM CHLORIDE 1000 MILLILITER(S): 9 INJECTION INTRAMUSCULAR; INTRAVENOUS; SUBCUTANEOUS at 10:48

## 2023-06-08 RX ADMIN — GEMCITABINE 1650 MILLIGRAM(S): 38 INJECTION, SOLUTION INTRAVENOUS at 12:52

## 2023-06-08 RX ADMIN — CISPLATIN 115 MILLIGRAM(S): 1 INJECTION, SOLUTION INTRAVENOUS at 12:42

## 2023-06-08 RX ADMIN — FOSAPREPITANT DIMEGLUMINE 500 MILLIGRAM(S): 150 INJECTION, POWDER, LYOPHILIZED, FOR SOLUTION INTRAVENOUS at 10:51

## 2023-06-08 RX ADMIN — Medication 300 UNIT(S): at 15:24

## 2023-06-08 RX ADMIN — Medication 204 MILLIGRAM(S): at 10:36

## 2023-06-15 ENCOUNTER — OUTPATIENT (OUTPATIENT)
Dept: OUTPATIENT SERVICES | Facility: HOSPITAL | Age: 68
LOS: 1 days | End: 2023-06-15
Payer: MEDICARE

## 2023-06-15 ENCOUNTER — LABORATORY RESULT (OUTPATIENT)
Age: 68
End: 2023-06-15

## 2023-06-15 ENCOUNTER — APPOINTMENT (OUTPATIENT)
Dept: INFUSION THERAPY | Facility: CLINIC | Age: 68
End: 2023-06-15

## 2023-06-15 ENCOUNTER — APPOINTMENT (OUTPATIENT)
Dept: HEMATOLOGY ONCOLOGY | Facility: CLINIC | Age: 68
End: 2023-06-15
Payer: MEDICARE

## 2023-06-15 VITALS
DIASTOLIC BLOOD PRESSURE: 74 MMHG | HEART RATE: 60 BPM | HEIGHT: 63 IN | TEMPERATURE: 97 F | SYSTOLIC BLOOD PRESSURE: 134 MMHG | OXYGEN SATURATION: 100 % | WEIGHT: 147.05 LBS | RESPIRATION RATE: 18 BRPM

## 2023-06-15 VITALS
OXYGEN SATURATION: 98 % | RESPIRATION RATE: 16 BRPM | SYSTOLIC BLOOD PRESSURE: 118 MMHG | DIASTOLIC BLOOD PRESSURE: 73 MMHG | HEART RATE: 68 BPM | TEMPERATURE: 98 F

## 2023-06-15 VITALS
RESPIRATION RATE: 18 BRPM | WEIGHT: 146.98 LBS | HEART RATE: 60 BPM | OXYGEN SATURATION: 100 % | BODY MASS INDEX: 26.04 KG/M2 | DIASTOLIC BLOOD PRESSURE: 74 MMHG | HEIGHT: 63 IN | TEMPERATURE: 97.1 F | SYSTOLIC BLOOD PRESSURE: 137 MMHG

## 2023-06-15 DIAGNOSIS — C68.0 MALIGNANT NEOPLASM OF URETHRA: ICD-10-CM

## 2023-06-15 DIAGNOSIS — R53.83 OTHER FATIGUE: ICD-10-CM

## 2023-06-15 DIAGNOSIS — N75.0 CYST OF BARTHOLIN'S GLAND: Chronic | ICD-10-CM

## 2023-06-15 DIAGNOSIS — T45.1X5A OTHER FATIGUE: ICD-10-CM

## 2023-06-15 LAB — MAGNESIUM SERPL-MCNC: 2 MG/DL

## 2023-06-15 PROCEDURE — 99213 OFFICE O/P EST LOW 20 MIN: CPT | Mod: 25

## 2023-06-15 PROCEDURE — 36415 COLL VENOUS BLD VENIPUNCTURE: CPT

## 2023-06-15 PROCEDURE — 96413 CHEMO IV INFUSION 1 HR: CPT

## 2023-06-15 PROCEDURE — 96375 TX/PRO/DX INJ NEW DRUG ADDON: CPT

## 2023-06-15 RX ORDER — PALONOSETRON HYDROCHLORIDE 0.25 MG/5ML
0.25 INJECTION, SOLUTION INTRAVENOUS ONCE
Refills: 0 | Status: COMPLETED | OUTPATIENT
Start: 2023-06-15 | End: 2023-06-15

## 2023-06-15 RX ORDER — DEXAMETHASONE 0.5 MG/5ML
10 ELIXIR ORAL ONCE
Refills: 0 | Status: COMPLETED | OUTPATIENT
Start: 2023-06-15 | End: 2023-06-15

## 2023-06-15 RX ORDER — GEMCITABINE 38 MG/ML
1650 INJECTION, SOLUTION INTRAVENOUS ONCE
Refills: 0 | Status: COMPLETED | OUTPATIENT
Start: 2023-06-15 | End: 2023-06-15

## 2023-06-15 RX ADMIN — GEMCITABINE 1650 MILLIGRAM(S): 38 INJECTION, SOLUTION INTRAVENOUS at 12:22

## 2023-06-15 RX ADMIN — GEMCITABINE 1650 MILLIGRAM(S): 38 INJECTION, SOLUTION INTRAVENOUS at 12:52

## 2023-06-15 RX ADMIN — Medication 204 MILLIGRAM(S): at 11:39

## 2023-06-15 RX ADMIN — Medication 10 MILLIGRAM(S): at 11:54

## 2023-06-15 RX ADMIN — Medication 300 UNIT(S): at 13:00

## 2023-06-15 RX ADMIN — PALONOSETRON HYDROCHLORIDE 0.25 MILLIGRAM(S): 0.25 INJECTION, SOLUTION INTRAVENOUS at 11:39

## 2023-06-15 NOTE — HISTORY OF PRESENT ILLNESS
[de-identified] : Sandra Alston is a 68 year old here referred by Dr. Moulton for evaluation for urothelial carcinoma involving bladder and urethra.  Today here for f/u with C3D8 chemo. \par \par Oncologic history:\par 1.  urothelial carcinoma involving bladder and urethra\par --presented with hematuria and urinary retention\par --CT scan urogram 3/8/23 showed a 4.5 cm thick walled, rim enhancing mass with necrosis at the level of the urethra extending close to the L ureteral orifice.  No lymphadenopathy.  no visceral metastatic disease\par --cystoscopy 3/9/23 showed a flat solid appearing tumor at the L trigone and Lateral wall of the bladder extending into the urethra\par --TURBT 3/22/23:  biopsies from L bladder neck, L trigone, anterior bladder neck, and bladder neck all showing high grade, muscle invasive urothelial carcinoma.\par --chest CT 4/14/23 negative for thoracic metastatic disease\par --neoadjuvant chemotherapy with cisplatin and gemcitabine 4/27/23 - \par \par CT urogram also showed a thickened endometrial stripe - patient reports this is a known finding and an endometrial biopsy with a gynecologist last year that was normal/negative.\par \par PMH also notable for MGUS - no records - follows with Dr Brown on [place].  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 She is feeling fine except for low energy with fatigue a few days after the chemo, scalp itching resolved with baby oil, occasional humming noise in ears, stable.  She gained some weight. Denies nausea, vomiting, constipation, diarrhea, hearing loss, blood in urine or stool, or abdominal pain.

## 2023-06-15 NOTE — REVIEW OF SYSTEMS
[Fatigue] : fatigue [Dysuria] : dysuria [Negative] : Heme/Lymph [Fever] : no fever [Chills] : no chills [Night Sweats] : no night sweats [Recent Change In Weight] : ~T recent weight change [Vaginal Discharge] : no vaginal discharge [Dysmenorrhea/Abn Vaginal Bleeding] : no dysmenorrhea/abnormal vaginal bleeding [Skin Rash] : no skin rash [Skin Wound] : no skin wound [Confused] : no confusion [Dizziness] : no dizziness [Fainting] : no fainting [Difficulty Walking] : no difficulty walking [FreeTextEntry2] : weight gain [FreeTextEntry4] : occasional "humming noise in ears"- improved  [FreeTextEntry8] :  dubose catheter in place [de-identified] : occasional headache

## 2023-06-15 NOTE — PHYSICAL EXAM
[Restricted in physically strenuous activity but ambulatory and able to carry out work of a light or sedentary nature] : Status 1- Restricted in physically strenuous activity but ambulatory and able to carry out work of a light or sedentary nature, e.g., light house work, office work [Normal] : normal appearance, no rash, nodules, vesicles, ulcers, erythema [de-identified] : chemoport in place, no sign of infection  [de-identified] : dubose catheter in place with clear yellow urine

## 2023-06-15 NOTE — ASSESSMENT
[FreeTextEntry1] : Sandra Alston is a 68 year old female who presented with gross hematuria and urinary retention.  CT urogram showed a 4 cm tumor in the bladder and extending into the mid urethra.  No lymphadenopathy or distant metastatic disease on CT c/a/p.  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  Initiated neoadjuvant chemotherapy with gemcitabine and cisplatin 4/27/23. \par \par Plan:\par 1. 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  Prognosis was also reviewed;  this is a potentially curable malignancy\par --Standard of care for muscle invasive bladder cancer is neoadjuvant chemotherapy.  Based on her age, functional status, comorbid conditions - recommended treatment with gemcitabine and cisplatin x 4 cycles (12 weeks) if tolerated.  \par - Initiated gem/cis 4/27/23, tolerating chemo well with manageable toxicity, lab reviewed and ok to proceed with C3D8\par --s/p IR port placement 4/25/23\par --will complete cycle 4 and repeat CT C/A/P in late July. \par \par 2. urinary retention\par --dubose in place, has f/u with Dr Moulton 6/20/23 for change.\par \par 3.  MGUS - history noted, follows with hematologist on [place].  defer further w/u for now.\par \par 4. h/o  Iron deficiency \par --on PO iron, Ferritin/Iron/TIBC WNL on 5/16/23\par --can stop PO iron if constipation persists. \par \par 5. scalp itching-resolved \par \par I saw and evaluated the patient with Dr. Piedra\par C4D1 on 6/29\par RTC with C4d8 on 7/6\par with CBC, CMP, mag

## 2023-06-20 ENCOUNTER — APPOINTMENT (OUTPATIENT)
Dept: UROLOGY | Facility: CLINIC | Age: 68
End: 2023-06-20
Payer: MEDICARE

## 2023-06-20 VITALS
DIASTOLIC BLOOD PRESSURE: 84 MMHG | TEMPERATURE: 97.9 F | OXYGEN SATURATION: 99 % | SYSTOLIC BLOOD PRESSURE: 165 MMHG | HEART RATE: 72 BPM

## 2023-06-20 PROCEDURE — 99213 OFFICE O/P EST LOW 20 MIN: CPT | Mod: 25

## 2023-06-20 PROCEDURE — 51702 INSERT TEMP BLADDER CATH: CPT

## 2023-06-20 NOTE — HISTORY OF PRESENT ILLNESS
[FreeTextEntry1] : Language: English\par Date of First visit: 3/9/2023\par Accompanied by: - Pratik\par Contact info: ***\par Referring Provider/PCP: Alondra Van\par [facility] [phone]\par fax [phone]\par \par \par \par CC/ Problem List:\par \par ===============================================================================\par FIRST VISIT:\par The patient was a 68 year female with an extensive smoking history and kidney stones who first presented on 2023 for gross hematuria. \par She first noticed some blood on 3/3/2023 and went to the ER on Saturday for urinary retention. Dubose catheter was placed. PVR 1100 ml. No imaging was done in the ER. Patient was started on Cefpodoxime 200 mg BID for 10 days. She does reports clots and the urine is starting to clear up. Denies fevers, but did have chills. \par \par She is an active smoker. Reports that she has cut down to 4 cigarettes a day over the past year. She has had kidney stones in the past, saw Dr. Rubin had started ER on medication to "break up the stones". Hematuria w/u in 2018 was negative with Dr. Rubin included cysto and CT Urogram. \par \par \par -------------------------------------------------------------------------------------------\par INTERVAL VISITS:\par \par Her cysto showed bladder and urethral abnormalities that almost seemed submucosal\par \par On 3/22/2023 she underwent a TURBT of her bladder neck and bladder which revealed at least T2 HG TCC. Interestingly some of the surface mucosa was NOT involved.\par She has been doing well with her dubose. She has not had dysuria/urethral burning. She quit smoking in mid March. She is eating a lot.\par \par On 2023 she passed her TOV but on 2023 th pt had her dubose put back in for retention. They just called her with results saying she had a UTI. She was asymptomatic. \par \par She saw Dr. Banks and was started on neoadjuvant chemo (Hillrose/Cis) on 2023. The plan is for her to get four cycles and have a repeat CT in 2023.\par \par The patient's age today 2023 is 68 year old.\par Please note interval events and changes in PMH, PSH, MEDS and ALLERGIES were reviewed.\par She states she is eating a little too much and needs to cut down. She is tolerating chemo VERY well.\par She is also tolerating her dubose well.\par \par ===============================================================================\par \par PMH: pre-HTN, Sleep apnea, M spike (monoclonal spike), urothelial carcinoma\par PSH: Bartholin's cyst, TURBT\par POBH: (if applicable) \par FH: Mother had heart disease, Father had bone cancer\par \par ALL: NKDA\par MEDS: Amlodipine, Cymbalta, Clonopin, Crestor, Vit D, MVI, Calcium, Pre-biotic/probiotic, Metamucil, Beet juice\par SOC: Active smoker trying to quit, Denies EtOH, denies drugs\par \par \par ROS: Review of Systems is as per HPI unless otherwise denoted below\par \par \par ===============================================================================\par DATA: \par \par LABS:-------------------------------------------------------------------------------------------------------------------\par 3/7/2023: HCT 32.7, sCRe 0.53\par 3/9/2023: Urine culture negative\par \par RADS:-------------------------------------------------------------------------------------------------------------------\par 3/8/2023: CTU\par 4.5 cm irregular thick walled rim-enhancing mass with central cystic/necrotic changes centered at level of urethra (left side near the bladder, moving more anteriorly distally) extending close to left ureteral orifice without hydronephrosis, suspect urothelial malignancy. Recommend cystoscopy and tissue biopsy correlation.\par No evidence of upper tract disease, distant metastases or suspicious lymphadenopathy.\par Thickened endometrial complex, correlate with pathology results given history of recent biopsy.\par films personally reviewed\par \par 2023: NCCT\par Thyroid nodule. (she had those recently checked)\par MIld centrilobular and moderate paraseptal emphysema is most pronounces in the apices. There are associated Blebs. Mild scarring. No discrete nodules. Mild ectasia. No interstitial disease. ADrenals are normal. No bone lesions\par \par \par PATHOLOGY/CYTOLOGY:-------------------------------------------------------------------------------------------\par 3/7/2023: Cytology [facility]\par \par 3/22/2023: TURBT with bladder neck and bladder sections\par a. Left bladder neck: \par HG TCC with glandular differentiation, T2, no LVI\par Surface involvement not seen\par b Left trigone\par HG TCC with glandular differentiation, T2, no LVI\par c. Anterior bladder neck\par HG TCC, T2, no LVI, surface involvement not seen\par d. Bladder neck biopsy\par HG TCC with glandular differentiation, T2, No LVI, surface involvement not seen\par \par \par VOIDING STUDIES: ----------------------------------------------------------------------------------------------------\par 2023: TOV\par I filled her with about 160cc. She voided into the toilet without straining. She did not leak. Her PVR was 23cc.\par We gave her a dose of Cefuroxime 500mg po.\par \par 2023: Dubose put back in for retention\par 2023: Dubose change\par 2023: Dbuose change to 16Fr under aseptic conditions. Clear urine effluxed from catheter; Chaperone: AVEL Lin\par \par \par \par STONE STUDIES: (Analysis/LLSA)----------------------------------------------------------------------------------\par \par \par \par PROCEDURES: -----------------------------------------------------------------------------------------------\par 3/9/2023: Cysto\par The patient had a flat solid appearing tumor involving the left trigone and lateral wall potentially going into the urethra.  There was some posterior wall / dome inflammation as well  I could not see either UO. ? Mass in the urethra\par A dubose was replaced after this procedure under aseptic conditions. Clear urine was obtained.\par \par 2023: Cath change\par The pts' catheter was changed aseptically to a new 16Fr Dubose connected to SD.\par Clear urine effluxed out.\par \par ===============================================================================\par \par PHYSICAL EXAM:\par \par GEN: AAOx3, NAD, Habitus: normal\par \par BARRIERS to CARE: none\par \par PSYCH: Appropriate Behavior, Affect Congruent\par \par HEENT: AT/NC Trachea midline. \par \par Lungs: No labored breathing.\par \par NEURO: + Movement, all 4 extremities grossly intact without deficits. No tremors.\par \par SKIN: Warm dry. No visible rashes or ulcers\par \par GAIT: Gait normal, Stability good\par \par  FOCUSED: -------done 2023------------------------------------------------\par \par Bladder: Nondistended, Nontender, No masses\par \par Ext Genitalia: Normal, VERY Atrophic\par \par Urethra: Normal, no masses, fixed and immobile\par \par 5/15/2023: Tight introitus with +pelvic mass. \par \par =======================================================================================\par \par \par ASSESSMENT and PLAN\par \par The patient is a 68 year female with a history of the following:\par \par 1. 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 We changed her dubose today.\par She is on Hillrose/Cis and is pending a CT after finishing her chemo in July.\par we discussed the importance of good nutrition and keeping a stable weight. \par \par She will return to see me after her CT and depending on her disease progression we will consider a TOV.\par \par \par \par -----------------------------------------------------------------------------------------------------\par LABS/TESTS Ordered: \par Meds Ordered:\par Follow up: 1 month for TOV vs Dubose change depending on CT results\par -----------------------------------------------------------------------------------------------------\par \par The total amount of time I have personally spent preparing for this visit, reviewing the patient's test results, obtaining external history, ordering tests/medications, documenting clinical information, communicating with and counseling the patient/family and/or caregiver(s), and spent face to face with the patient explaining the above was  25 minutes.\par \par Thank you for allowing me to assist in the care of your patient. Should you have any questions please do not hesitate to reach out to me.\par \par \[facility] Hayley Moulton MD\[facility] Associate \[facility] Department of Urology\[facility] Phone: [phone] Fax: [phone]\[facility] \[facility] [address]\[facility] [address]\[facility] \[facility]

## 2023-06-29 ENCOUNTER — APPOINTMENT (OUTPATIENT)
Dept: INFUSION THERAPY | Facility: CLINIC | Age: 68
End: 2023-06-29

## 2023-06-29 ENCOUNTER — OUTPATIENT (OUTPATIENT)
Dept: OUTPATIENT SERVICES | Facility: HOSPITAL | Age: 68
LOS: 1 days | End: 2023-06-29
Payer: MEDICARE

## 2023-06-29 VITALS
HEART RATE: 67 BPM | RESPIRATION RATE: 18 BRPM | TEMPERATURE: 97 F | DIASTOLIC BLOOD PRESSURE: 70 MMHG | SYSTOLIC BLOOD PRESSURE: 139 MMHG | WEIGHT: 147.93 LBS | OXYGEN SATURATION: 99 % | HEIGHT: 63 IN

## 2023-06-29 VITALS
HEART RATE: 73 BPM | OXYGEN SATURATION: 96 % | RESPIRATION RATE: 18 BRPM | SYSTOLIC BLOOD PRESSURE: 125 MMHG | DIASTOLIC BLOOD PRESSURE: 80 MMHG | TEMPERATURE: 98 F

## 2023-06-29 DIAGNOSIS — C68.0 MALIGNANT NEOPLASM OF URETHRA: ICD-10-CM

## 2023-06-29 LAB
ALBUMIN SERPL ELPH-MCNC: 3.5 G/DL — SIGNIFICANT CHANGE UP (ref 3.3–5)
ALP SERPL-CCNC: 70 U/L — SIGNIFICANT CHANGE UP (ref 40–120)
ALT FLD-CCNC: 20 U/L — SIGNIFICANT CHANGE UP (ref 10–45)
ANION GAP SERPL CALC-SCNC: 10 MMOL/L — SIGNIFICANT CHANGE UP (ref 5–17)
AST SERPL-CCNC: 25 U/L — SIGNIFICANT CHANGE UP (ref 10–40)
BILIRUB SERPL-MCNC: 0.4 MG/DL — SIGNIFICANT CHANGE UP (ref 0.2–1.2)
BUN SERPL-MCNC: 12 MG/DL — SIGNIFICANT CHANGE UP (ref 7–23)
CALCIUM SERPL-MCNC: 9.2 MG/DL — SIGNIFICANT CHANGE UP (ref 8.4–10.5)
CHLORIDE SERPL-SCNC: 107 MMOL/L — SIGNIFICANT CHANGE UP (ref 96–108)
CO2 SERPL-SCNC: 27 MMOL/L — SIGNIFICANT CHANGE UP (ref 22–31)
CREAT SERPL-MCNC: 0.5 MG/DL — SIGNIFICANT CHANGE UP (ref 0.5–1.3)
EGFR: 101 ML/MIN/1.73M2 — SIGNIFICANT CHANGE UP
GLUCOSE SERPL-MCNC: 94 MG/DL — SIGNIFICANT CHANGE UP (ref 70–99)
HCT VFR BLD CALC: 30.2 % — LOW (ref 34.5–45)
HGB BLD-MCNC: 10 G/DL — LOW (ref 11.5–15.5)
LYMPHOCYTES # BLD AUTO: 2.4 K/UL — SIGNIFICANT CHANGE UP (ref 1–3.3)
LYMPHOCYTES # BLD AUTO: 39.5 % — SIGNIFICANT CHANGE UP (ref 13–44)
MAGNESIUM SERPL-MCNC: 2.2 MG/DL — SIGNIFICANT CHANGE UP (ref 1.6–2.6)
MCHC RBC-ENTMCNC: 29.3 PG — SIGNIFICANT CHANGE UP (ref 27–34)
MCHC RBC-ENTMCNC: 33.1 GM/DL — SIGNIFICANT CHANGE UP (ref 32–36)
MCV RBC AUTO: 88.6 FL — SIGNIFICANT CHANGE UP (ref 80–100)
NEUTROPHILS # BLD AUTO: 2.8 K/UL — SIGNIFICANT CHANGE UP (ref 1.8–7.4)
NEUTROPHILS NFR BLD AUTO: 46.6 % — SIGNIFICANT CHANGE UP (ref 43–77)
PLATELET # BLD AUTO: 259 K/UL — SIGNIFICANT CHANGE UP (ref 150–400)
POTASSIUM SERPL-MCNC: 3.7 MMOL/L — SIGNIFICANT CHANGE UP (ref 3.5–5.3)
POTASSIUM SERPL-SCNC: 3.7 MMOL/L — SIGNIFICANT CHANGE UP (ref 3.5–5.3)
PROT SERPL-MCNC: 6.7 G/DL — SIGNIFICANT CHANGE UP (ref 6–8.3)
RBC # BLD: 3.41 M/UL — LOW (ref 3.8–5.2)
RBC # FLD: 20.9 % — HIGH (ref 10.3–14.5)
SODIUM SERPL-SCNC: 144 MMOL/L — SIGNIFICANT CHANGE UP (ref 135–145)
WBC # BLD: 6 K/UL — SIGNIFICANT CHANGE UP (ref 3.8–10.5)
WBC # FLD AUTO: 6 K/UL — SIGNIFICANT CHANGE UP (ref 3.8–10.5)

## 2023-06-29 PROCEDURE — 36415 COLL VENOUS BLD VENIPUNCTURE: CPT

## 2023-06-29 PROCEDURE — 96367 TX/PROPH/DG ADDL SEQ IV INF: CPT

## 2023-06-29 PROCEDURE — 96361 HYDRATE IV INFUSION ADD-ON: CPT

## 2023-06-29 PROCEDURE — 80053 COMPREHEN METABOLIC PANEL: CPT

## 2023-06-29 PROCEDURE — 96413 CHEMO IV INFUSION 1 HR: CPT

## 2023-06-29 PROCEDURE — 96417 CHEMO IV INFUS EACH ADDL SEQ: CPT

## 2023-06-29 PROCEDURE — 96375 TX/PRO/DX INJ NEW DRUG ADDON: CPT

## 2023-06-29 PROCEDURE — 85025 COMPLETE CBC W/AUTO DIFF WBC: CPT

## 2023-06-29 PROCEDURE — 83735 ASSAY OF MAGNESIUM: CPT

## 2023-06-29 RX ORDER — FOSAPREPITANT DIMEGLUMINE 150 MG/5ML
150 INJECTION, POWDER, LYOPHILIZED, FOR SOLUTION INTRAVENOUS ONCE
Refills: 0 | Status: COMPLETED | OUTPATIENT
Start: 2023-06-29 | End: 2023-06-29

## 2023-06-29 RX ORDER — SODIUM CHLORIDE 9 MG/ML
1000 INJECTION INTRAMUSCULAR; INTRAVENOUS; SUBCUTANEOUS ONCE
Refills: 0 | Status: COMPLETED | OUTPATIENT
Start: 2023-06-29 | End: 2023-06-29

## 2023-06-29 RX ORDER — GEMCITABINE 38 MG/ML
1700 INJECTION, SOLUTION INTRAVENOUS ONCE
Refills: 0 | Status: COMPLETED | OUTPATIENT
Start: 2023-06-29 | End: 2023-06-29

## 2023-06-29 RX ORDER — CISPLATIN 1 MG/ML
120 INJECTION, SOLUTION INTRAVENOUS ONCE
Refills: 0 | Status: COMPLETED | OUTPATIENT
Start: 2023-06-29 | End: 2023-06-29

## 2023-06-29 RX ORDER — PALONOSETRON HYDROCHLORIDE 0.25 MG/5ML
0.25 INJECTION, SOLUTION INTRAVENOUS ONCE
Refills: 0 | Status: COMPLETED | OUTPATIENT
Start: 2023-06-29 | End: 2023-06-29

## 2023-06-29 RX ORDER — DEXAMETHASONE 0.5 MG/5ML
10 ELIXIR ORAL ONCE
Refills: 0 | Status: COMPLETED | OUTPATIENT
Start: 2023-06-29 | End: 2023-06-29

## 2023-06-29 RX ADMIN — Medication 204 MILLIGRAM(S): at 10:53

## 2023-06-29 RX ADMIN — PALONOSETRON HYDROCHLORIDE 0.25 MILLIGRAM(S): 0.25 INJECTION, SOLUTION INTRAVENOUS at 10:53

## 2023-06-29 RX ADMIN — CISPLATIN 120 MILLIGRAM(S): 1 INJECTION, SOLUTION INTRAVENOUS at 11:34

## 2023-06-29 RX ADMIN — GEMCITABINE 1700 MILLIGRAM(S): 38 INJECTION, SOLUTION INTRAVENOUS at 11:11

## 2023-06-29 RX ADMIN — FOSAPREPITANT DIMEGLUMINE 500 MILLIGRAM(S): 150 INJECTION, POWDER, LYOPHILIZED, FOR SOLUTION INTRAVENOUS at 10:23

## 2023-06-29 RX ADMIN — SODIUM CHLORIDE 1000 MILLILITER(S): 9 INJECTION, SOLUTION INTRAVENOUS at 14:30

## 2023-06-29 RX ADMIN — Medication 10 MILLIGRAM(S): at 11:08

## 2023-06-29 RX ADMIN — SODIUM CHLORIDE 507 MILLILITER(S): 9 INJECTION, SOLUTION INTRAVENOUS at 12:35

## 2023-06-29 RX ADMIN — SODIUM CHLORIDE 1000 MILLILITER(S): 9 INJECTION INTRAMUSCULAR; INTRAVENOUS; SUBCUTANEOUS at 10:32

## 2023-06-29 RX ADMIN — Medication 300 UNIT(S): at 14:45

## 2023-06-29 RX ADMIN — GEMCITABINE 1700 MILLIGRAM(S): 38 INJECTION, SOLUTION INTRAVENOUS at 11:33

## 2023-06-29 RX ADMIN — FOSAPREPITANT DIMEGLUMINE 150 MILLIGRAM(S): 150 INJECTION, POWDER, LYOPHILIZED, FOR SOLUTION INTRAVENOUS at 10:53

## 2023-06-29 RX ADMIN — SODIUM CHLORIDE 1000 MILLILITER(S): 9 INJECTION INTRAMUSCULAR; INTRAVENOUS; SUBCUTANEOUS at 09:32

## 2023-06-29 RX ADMIN — CISPLATIN 120 MILLIGRAM(S): 1 INJECTION, SOLUTION INTRAVENOUS at 12:34

## 2023-07-06 ENCOUNTER — OUTPATIENT (OUTPATIENT)
Dept: OUTPATIENT SERVICES | Facility: HOSPITAL | Age: 68
LOS: 1 days | End: 2023-07-06
Payer: MEDICARE

## 2023-07-06 ENCOUNTER — LABORATORY RESULT (OUTPATIENT)
Age: 68
End: 2023-07-06

## 2023-07-06 ENCOUNTER — NON-APPOINTMENT (OUTPATIENT)
Age: 68
End: 2023-07-06

## 2023-07-06 ENCOUNTER — APPOINTMENT (OUTPATIENT)
Dept: HEMATOLOGY ONCOLOGY | Facility: CLINIC | Age: 68
End: 2023-07-06
Payer: MEDICARE

## 2023-07-06 ENCOUNTER — APPOINTMENT (OUTPATIENT)
Dept: INFUSION THERAPY | Facility: CLINIC | Age: 68
End: 2023-07-06

## 2023-07-06 VITALS
HEART RATE: 64 BPM | BODY MASS INDEX: 26.75 KG/M2 | RESPIRATION RATE: 18 BRPM | DIASTOLIC BLOOD PRESSURE: 79 MMHG | WEIGHT: 151 LBS | OXYGEN SATURATION: 97 % | SYSTOLIC BLOOD PRESSURE: 153 MMHG | HEIGHT: 63 IN | TEMPERATURE: 97.6 F

## 2023-07-06 VITALS
DIASTOLIC BLOOD PRESSURE: 84 MMHG | SYSTOLIC BLOOD PRESSURE: 161 MMHG | RESPIRATION RATE: 20 BRPM | OXYGEN SATURATION: 98 % | HEART RATE: 84 BPM | TEMPERATURE: 98 F

## 2023-07-06 VITALS
OXYGEN SATURATION: 97 % | RESPIRATION RATE: 20 BRPM | SYSTOLIC BLOOD PRESSURE: 134 MMHG | HEART RATE: 72 BPM | TEMPERATURE: 98 F | DIASTOLIC BLOOD PRESSURE: 74 MMHG

## 2023-07-06 DIAGNOSIS — C68.0 MALIGNANT NEOPLASM OF URETHRA: ICD-10-CM

## 2023-07-06 DIAGNOSIS — K59.00 CONSTIPATION, UNSPECIFIED: ICD-10-CM

## 2023-07-06 DIAGNOSIS — Z09 ENCOUNTER FOR FOLLOW-UP EXAMINATION AFTER COMPLETED TREATMENT FOR CONDITIONS OTHER THAN MALIGNANT NEOPLASM: ICD-10-CM

## 2023-07-06 DIAGNOSIS — N75.0 CYST OF BARTHOLIN'S GLAND: Chronic | ICD-10-CM

## 2023-07-06 LAB
ALBUMIN SERPL ELPH-MCNC: 3.7 G/DL
ALP BLD-CCNC: 77 U/L
ALT SERPL-CCNC: 22 U/L
ANION GAP SERPL CALC-SCNC: 6 MMOL/L
AST SERPL-CCNC: 25 U/L
BILIRUB SERPL-MCNC: 0.3 MG/DL
BUN SERPL-MCNC: 10 MG/DL
CALCIUM SERPL-MCNC: 9.2 MG/DL
CHLORIDE SERPL-SCNC: 104 MMOL/L
CO2 SERPL-SCNC: 28 MMOL/L
CREAT SERPL-MCNC: 0.5 MG/DL
EGFR: 101 ML/MIN/1.73M2
GLUCOSE SERPL-MCNC: 93 MG/DL
MAGNESIUM SERPL-MCNC: 2.2 MG/DL
POTASSIUM SERPL-SCNC: 4.1 MMOL/L
PROT SERPL-MCNC: 7 G/DL
SODIUM SERPL-SCNC: 138 MMOL/L

## 2023-07-06 PROCEDURE — 99214 OFFICE O/P EST MOD 30 MIN: CPT | Mod: 25

## 2023-07-06 PROCEDURE — 96413 CHEMO IV INFUSION 1 HR: CPT

## 2023-07-06 PROCEDURE — 36415 COLL VENOUS BLD VENIPUNCTURE: CPT

## 2023-07-06 PROCEDURE — 96375 TX/PRO/DX INJ NEW DRUG ADDON: CPT

## 2023-07-06 RX ORDER — DEXAMETHASONE 0.5 MG/5ML
10 ELIXIR ORAL ONCE
Refills: 0 | Status: COMPLETED | OUTPATIENT
Start: 2023-07-06 | End: 2023-07-06

## 2023-07-06 RX ORDER — PALONOSETRON HYDROCHLORIDE 0.25 MG/5ML
0.25 INJECTION, SOLUTION INTRAVENOUS ONCE
Refills: 0 | Status: COMPLETED | OUTPATIENT
Start: 2023-07-06 | End: 2023-07-06

## 2023-07-06 RX ORDER — GEMCITABINE 38 MG/ML
1700 INJECTION, SOLUTION INTRAVENOUS ONCE
Refills: 0 | Status: COMPLETED | OUTPATIENT
Start: 2023-07-06 | End: 2023-07-06

## 2023-07-06 RX ADMIN — Medication 10 MILLIGRAM(S): at 11:24

## 2023-07-06 RX ADMIN — PALONOSETRON HYDROCHLORIDE 0.25 MILLIGRAM(S): 0.25 INJECTION, SOLUTION INTRAVENOUS at 11:33

## 2023-07-06 RX ADMIN — Medication 204 MILLIGRAM(S): at 11:07

## 2023-07-06 RX ADMIN — GEMCITABINE 1700 MILLIGRAM(S): 38 INJECTION, SOLUTION INTRAVENOUS at 12:13

## 2023-07-06 RX ADMIN — GEMCITABINE 1700 MILLIGRAM(S): 38 INJECTION, SOLUTION INTRAVENOUS at 11:34

## 2023-07-06 RX ADMIN — Medication 300 UNIT(S): at 12:14

## 2023-07-06 NOTE — REVIEW OF SYSTEMS
[Fatigue] : fatigue [Recent Change In Weight] : ~T recent weight change [Dysuria] : dysuria [Negative] : Heme/Lymph [Fever] : no fever [Chills] : no chills [Night Sweats] : no night sweats [Abdominal Pain] : no abdominal pain [Vomiting] : no vomiting [Constipation] : constipation [Vaginal Discharge] : no vaginal discharge [Dysmenorrhea/Abn Vaginal Bleeding] : no dysmenorrhea/abnormal vaginal bleeding [Skin Rash] : no skin rash [Skin Wound] : no skin wound [Confused] : no confusion [Dizziness] : no dizziness [Fainting] : no fainting [Difficulty Walking] : no difficulty walking [FreeTextEntry2] : weight gain [FreeTextEntry7] : had BM this morning, was hard stool [FreeTextEntry8] :  dubose catheter in place, chronic burning pain  [de-identified] : occasional headache

## 2023-07-06 NOTE — ASSESSMENT
[FreeTextEntry1] : Sandra Alston is a 68 year old female who presented with gross hematuria and urinary retention.  CT urogram showed a 4 cm tumor in the bladder and extending into the mid urethra.  No lymphadenopathy or distant metastatic disease on CT c/a/p.  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  Initiated neoadjuvant chemotherapy with gemcitabine and cisplatin 4/27/23- 7/6/23\par \par Plan:\par 1. 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  Prognosis was also reviewed;  this is a potentially curable malignancy\par --Standard of care for muscle invasive bladder cancer is neoadjuvant chemotherapy.  Based on her age, functional status, comorbid conditions - recommended treatment with gemcitabine and cisplatin x 4 cycles (12 weeks) if tolerated.  \par - Initiated gem/cis 4/27/23, tolerating chemo well with manageable toxicity, lab reviewed  and ok to proceed with C4D8 today, which is the last chemo. \par --will repeat CT C/A/P for post treatment response, scheduled for 7/21 \par \par 2. urinary retention\par --dubose in place, f/u with Dr Moulton\par \par 3. MGUS - history noted, follows with hematologist on [place].  defer further w/u for now.\par \par 4. h/o  Iron deficiency/constipation\par --on PO iron, Ferritin/Iron/TIBC WNL on 5/16/23\par --advised to stop PO iron due to constipation but she wants to take 1 tablet daily.  Recommend MiraLAX or senna  \par \par Plan discussed with Dr. Piedra\par RTC on 7/27 after CT scans \par with CBC, CMP, Ferritin, iron/TIBC, B12/Folate

## 2023-07-06 NOTE — HISTORY OF PRESENT ILLNESS
[de-identified] : Sandra Alston is a 68 year old here referred by Dr. Moulton for evaluation for urothelial carcinoma involving bladder and urethra.  Today here for f/u with C4D8 chemo. \par \par Oncologic history:\par 1.  urothelial carcinoma involving bladder and urethra\par --presented with hematuria and urinary retention\par --CT scan urogram 3/8/23 showed a 4.5 cm thick walled, rim enhancing mass with necrosis at the level of the urethra extending close to the L ureteral orifice.  No lymphadenopathy.  no visceral metastatic disease\par --cystoscopy 3/9/23 showed a flat solid appearing tumor at the L trigone and Lateral wall of the bladder extending into the urethra\par --TURBT 3/22/23:  biopsies from L bladder neck, L trigone, anterior bladder neck, and bladder neck all showing high grade, muscle invasive urothelial carcinoma.\par --chest CT 4/14/23 negative for thoracic metastatic disease\par --neoadjuvant chemotherapy with cisplatin and gemcitabine 4/27/23 - \par \par CT urogram also showed a thickened endometrial stripe - patient reports this is a known finding and an endometrial biopsy with a gynecologist last year that was normal/negative.\par \par PMH also notable for MGUS - no records - follows with Dr Brown on [place].  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 , lives on [place] [de-identified] : Here for C4D8.  She had nausea, constipation, leg cramps, and increased fatigue x 1-2 days after the last chemo. Took Zofran, Metamucil and prune juice with help.  She gained some weight.  Otherwise she feels good. Denies nausea, vomiting, diarrhea, hearing loss, blood in urine or stool, or abdominal pain.

## 2023-07-06 NOTE — PHYSICAL EXAM
[Restricted in physically strenuous activity but ambulatory and able to carry out work of a light or sedentary nature] : Status 1- Restricted in physically strenuous activity but ambulatory and able to carry out work of a light or sedentary nature, e.g., light house work, office work [Normal] : affect appropriate [de-identified] : chemoport in place, no sign of infection  [de-identified] : dubose catheter in place with clear yellow urine   Infliximab Pregnancy And Lactation Text: This medication is Pregnancy Category B and is considered safe during pregnancy. It is unknown if this medication is excreted in breast milk.

## 2023-07-11 ENCOUNTER — RESULT REVIEW (OUTPATIENT)
Age: 68
End: 2023-07-11

## 2023-07-21 ENCOUNTER — APPOINTMENT (OUTPATIENT)
Dept: CT IMAGING | Facility: HOSPITAL | Age: 68
End: 2023-07-21

## 2023-07-21 ENCOUNTER — OUTPATIENT (OUTPATIENT)
Dept: OUTPATIENT SERVICES | Facility: HOSPITAL | Age: 68
LOS: 1 days | End: 2023-07-21
Payer: MEDICARE

## 2023-07-21 DIAGNOSIS — N75.0 CYST OF BARTHOLIN'S GLAND: Chronic | ICD-10-CM

## 2023-07-21 PROCEDURE — 71250 CT THORAX DX C-: CPT | Mod: 26

## 2023-07-21 PROCEDURE — 74177 CT ABD & PELVIS W/CONTRAST: CPT | Mod: 26

## 2023-07-21 PROCEDURE — 74177 CT ABD & PELVIS W/CONTRAST: CPT

## 2023-07-21 PROCEDURE — 71250 CT THORAX DX C-: CPT

## 2023-07-26 NOTE — REVIEW OF SYSTEMS
[Fever] : no fever [Chills] : no chills [Night Sweats] : no night sweats [Fatigue] : fatigue [Recent Change In Weight] : ~T recent weight change [Abdominal Pain] : no abdominal pain [Vomiting] : no vomiting [Constipation] : constipation [Dysuria] : dysuria [Vaginal Discharge] : no vaginal discharge [Dysmenorrhea/Abn Vaginal Bleeding] : no dysmenorrhea/abnormal vaginal bleeding [Skin Rash] : no skin rash [Skin Wound] : no skin wound [Confused] : no confusion [Dizziness] : no dizziness [Fainting] : no fainting [Difficulty Walking] : no difficulty walking [Negative] : Heme/Lymph [FreeTextEntry2] : weight gain [FreeTextEntry7] : had BM this morning, was hard stool [FreeTextEntry8] :  dubose catheter in place, chronic burning pain  [de-identified] : occasional headache

## 2023-07-27 ENCOUNTER — LABORATORY RESULT (OUTPATIENT)
Age: 68
End: 2023-07-27

## 2023-07-27 ENCOUNTER — APPOINTMENT (OUTPATIENT)
Dept: HEMATOLOGY ONCOLOGY | Facility: CLINIC | Age: 68
End: 2023-07-27
Payer: MEDICARE

## 2023-07-27 ENCOUNTER — NON-APPOINTMENT (OUTPATIENT)
Age: 68
End: 2023-07-27

## 2023-07-27 ENCOUNTER — APPOINTMENT (OUTPATIENT)
Dept: UROLOGY | Facility: CLINIC | Age: 68
End: 2023-07-27
Payer: MEDICARE

## 2023-07-27 VITALS
TEMPERATURE: 97.3 F | SYSTOLIC BLOOD PRESSURE: 148 MMHG | WEIGHT: 152 LBS | HEART RATE: 48 BPM | OXYGEN SATURATION: 98 % | DIASTOLIC BLOOD PRESSURE: 70 MMHG | HEIGHT: 63 IN | BODY MASS INDEX: 26.93 KG/M2 | RESPIRATION RATE: 18 BRPM

## 2023-07-27 VITALS
HEART RATE: 72 BPM | OXYGEN SATURATION: 97 % | DIASTOLIC BLOOD PRESSURE: 82 MMHG | SYSTOLIC BLOOD PRESSURE: 163 MMHG | TEMPERATURE: 97.4 F

## 2023-07-27 LAB
FERRITIN SERPL-MCNC: 89 NG/ML
IRON SATN MFR SERPL: 24 %
IRON SERPL-MCNC: 76 UG/DL
TIBC SERPL-MCNC: 318 UG/DL
UIBC SERPL-MCNC: 242 UG/DL

## 2023-07-27 PROCEDURE — 51702 INSERT TEMP BLADDER CATH: CPT

## 2023-07-27 PROCEDURE — 99214 OFFICE O/P EST MOD 30 MIN: CPT

## 2023-07-27 PROCEDURE — 99214 OFFICE O/P EST MOD 30 MIN: CPT | Mod: 25

## 2023-07-27 NOTE — PHYSICAL EXAM
[Fully active, able to carry on all pre-disease performance without restriction] : Status 0 - Fully active, able to carry on all pre-disease performance without restriction [Normal] : normal appearance, no rash, nodules, vesicles, ulcers, erythema [de-identified] : supple [de-identified] : normal RR, breathing pattern [de-identified] : normal HR [de-identified] : chemoport in place, no sign of infection  [de-identified] : not distended [de-identified] : dubose catheter in place with clear yellow urine

## 2023-07-27 NOTE — HISTORY OF PRESENT ILLNESS
[de-identified] : Sandra Alston is a 68 year old here referred by Dr. Moulton for evaluation for urothelial carcinoma involving bladder and urethra.  Today here for f/u after completing neoadjuvant chemotherapy. \par \par Oncologic history:\par 1.  urothelial carcinoma involving bladder and urethra\par --presented with hematuria and urinary retention\par --CT scan urogram 3/8/23 showed a 4.5 cm thick walled, rim enhancing mass with necrosis at the level of the urethra extending close to the L ureteral orifice.  No lymphadenopathy.  no visceral metastatic disease\par --cystoscopy 3/9/23 showed a flat solid appearing tumor at the L trigone and Lateral wall of the bladder extending into the urethra\par --TURBT 3/22/23:  biopsies from L bladder neck, L trigone, anterior bladder neck, and bladder neck all showing high grade, muscle invasive urothelial carcinoma.\par --chest CT 4/14/23 negative for thoracic metastatic disease\par --neoadjuvant chemotherapy with cisplatin and gemcitabine 4/27/23 - 7/6/23\par --CT c/a/p 7/2023 after completing NACT showed partial response, no interval development of metastatic disease\par \par CT urogram also showed a thickened endometrial stripe - patient reports this is a known finding and an endometrial biopsy with a gynecologist last year that was normal/negative.\par \par PMH also notable for MGUS - no records - follows with Dr Brown on [place].  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  RN with problems accessing port but she denies any problems w/ the port at home such as pain or swelling.  Yielded blood easily when needle access obtained.  \par + mild intermittent nausea  + constipation\par dubose still in place, no hematuria\par no neuropathy\par weight stable. energy good.\par occ tinnitus but hearing OK

## 2023-07-27 NOTE — ASSESSMENT
[FreeTextEntry1] : Sandra Alston is a 68 year old female who presented with gross hematuria and urinary retention.  CT urogram showed a 4 cm tumor in the bladder and extending into the mid urethra.  No lymphadenopathy or distant metastatic disease on CT c/a/p.  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  Completed neoadjuvant chemotherapy with gemcitabine and cisplatin 4/27/23- 7/6/23\par \par Plan:\par 1. 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  Prognosis was also reviewed;  this is a potentially curable malignancy\par --Standard of care for muscle invasive bladder cancer is neoadjuvant chemotherapy.  Based on her age, functional status, comorbid conditions - recommended treatment with gemcitabine and cisplatin x 4 cycles (12 weeks).  She tolerated this well\par --discussed CT scan results- partial response, no evidence of extra-vesical  progression\par --saw urologist today - surgical planning proceeds\par --discussed potential treatment w/ adjuvant nivolumab, pending path results from surgery.\par \par \par 2. urinary retention\par --dubose in place, f/u with Dr Moulton\par \par 3. MGUS - history noted, follows with hematologist on [place].  defer further w/u for now.\par \par 4. h/o  Iron deficiency/constipation\par --on PO iron, Ferritin/Iron/TIBC WNL on 5/16/23\par --advised to stop PO iron due to constipation \par --Recommend MiraLAX or senna  \par --CT scan w/ significant stool burden\par \par all questions answered\par RTC post-op to review path results and potential role for adjuvant therapy

## 2023-07-27 NOTE — HISTORY OF PRESENT ILLNESS
[FreeTextEntry1] : Language: English\par Date of First visit: 3/9/2023\par Accompanied by: - Pratik\par Contact info: ***\par Referring Provider/PCP: Alondra Van\par [facility] [phone]\par fax [phone]\par \par \par \par CC/ Problem List:\par \par ===============================================================================\par FIRST VISIT:\par The patient was a 68 year female with an extensive smoking history and kidney stones who first presented on 2023 for gross hematuria. \par She first noticed some blood on 3/3/2023 and went to the ER on Saturday for urinary retention. Dubose catheter was placed. PVR 1100 ml. No imaging was done in the ER. Patient was started on Cefpodoxime 200 mg BID for 10 days. She does reports clots and the urine is starting to clear up. Denies fevers, but did have chills. \par \par She is an active smoker. Reports that she has cut down to 4 cigarettes a day over the past year. She has had kidney stones in the past, saw Dr. Rubin had started ER on medication to "break up the stones". Hematuria w/u in 2018 was negative with Dr. Rubin included cysto and CT Urogram. \par \par \par -------------------------------------------------------------------------------------------\par INTERVAL VISITS:\par \par Her cysto showed bladder and urethral abnormalities that almost seemed submucosal\par \par On 3/22/2023 she underwent a TURBT of her bladder neck and bladder which revealed at least T2 HG TCC. Interestingly some of the surface mucosa was NOT involved.\par She has been doing well with her dubose. She has not had dysuria/urethral burning. She quit smoking in mid March. She is eating a lot.\par \par On 2023 she passed her TOV but on 2023 th pt had her dubose put back in for retention. They just called her with results saying she had a UTI. She was asymptomatic. \par \par She saw Dr. Banks and was started on neoadjuvant chemo (Clark/Cis) on 4/27/2023 x4 cycles.\par She is finished with her Clark/Cis. \par \par The patient's age today 2023 is 68 year old.\par Please note interval events and changes in PMH, PSH, MEDS and ALLERGIES were reviewed.\par \par She had repeat scans on 2023 that show a small mass and no mets.\par She is markedly constipated taking Senna. She is still not smoking.\par \par ===============================================================================\par \par PMH: pre-HTN, Sleep apnea, M spike (monoclonal spike), urothelial carcinoma\par PSH: Bartholin's cyst, TURBT\par POBH: (if applicable) \par FH: Mother had heart disease, Father had bone cancer\par \par ALL: NKDA\par MEDS: Amlodipine, Cymbalta, Clonopin, Crestor, Vit D, MVI, Calcium, Pre-biotic/probiotic, Metamucil, Beet juice\par SOC: quit smoking, Denies EtOH, denies drugs\par \par \par ROS: Review of Systems is as per HPI unless otherwise denoted below\par \par \par ===============================================================================\par DATA: \par \par LABS:-------------------------------------------------------------------------------------------------------------------\par 3/7/2023: HCT 32.7, sCRe 0.53\par 3/9/2023: Urine culture negative\par \par RADS:-------------------------------------------------------------------------------------------------------------------\par 3/8/2023: CTU\par 4.5 cm irregular thick walled rim-enhancing mass with central cystic/necrotic changes centered at level of urethra (left side near the bladder, moving more anteriorly distally) extending close to left ureteral orifice without hydronephrosis, suspect urothelial malignancy. Recommend cystoscopy and tissue biopsy correlation.\par No evidence of upper tract disease, distant metastases or suspicious lymphadenopathy.\par Thickened endometrial complex, correlate with pathology results given history of recent biopsy.\par films personally reviewed\par \par 2023: NCCT\par Thyroid nodule. (she had those recently checked)\par MIld centrilobular and moderate paraseptal emphysema is most pronounces in the apices. There are associated Blebs. Mild scarring. No discrete nodules. Mild ectasia. No interstitial disease. ADrenals are normal. No bone lesions\par \par  2023: CT CAP\par No evidence of mets. No sig hydro.\par Mild pelviectasis and ureteral fullness to level of bladder. Urethral lesion is smaller than prior with some enhancement. \par \par \par PATHOLOGY/CYTOLOGY:-------------------------------------------------------------------------------------------\par 3/7/2023: Cytology [facility]\par \par 3/22/2023: TURBT with bladder neck and bladder sections\par a. Left bladder neck: \par HG TCC with glandular differentiation, T2, no LVI\par Surface involvement not seen\par b Left trigone\par HG TCC with glandular differentiation, T2, no LVI\par c. Anterior bladder neck\par HG TCC, T2, no LVI, surface involvement not seen\par d. Bladder neck biopsy\par HG TCC with glandular differentiation, T2, No LVI, surface involvement not seen\par \par \par VOIDING STUDIES: ----------------------------------------------------------------------------------------------------\par 2023: TOV\par I filled her with about 160cc. She voided into the toilet without straining. She did not leak. Her PVR was 23cc.\par We gave her a dose of Cefuroxime 500mg po.\par \par 2023: Dubose put back in for retention\par 2023: Dubose change\par 2023: Dubose change to 16Fr under aseptic conditions. Clear urine effluxed from catheter; Chaperone: AVEL BuschSu Riley\par \par \par \par STONE STUDIES: (Analysis/LLSA)----------------------------------------------------------------------------------\par \par \par \par PROCEDURES: -----------------------------------------------------------------------------------------------\par 3/9/2023: Cysto\par The patient had a flat solid appearing tumor involving the left trigone and lateral wall potentially going into the urethra.  There was some posterior wall / dome inflammation as well  I could not see either UO. ? Mass in the urethra\par A dubose was replaced after this procedure under aseptic conditions. Clear urine was obtained.\par \par 2023: Cath change\par The pts' catheter was changed aseptically to a new 16Fr Dubose connected to SD.\par Clear urine effluxed out.\par \par 2023: Cath change\par The pts' catheter was changed aseptically to a new 16Fr Dubose connected to SD.\par Clear urine effluxed out. Tolerated well\par \par ===============================================================================\par \par PHYSICAL EXAM:\par \par GEN: AAOx3, NAD, Habitus: normal\par \par BARRIERS to CARE: none\par \par PSYCH: Appropriate Behavior, Affect Congruent\par \par HEENT: AT/NC Trachea midline. \par \par Lungs: No labored breathing.\par \par NEURO: + Movement, all 4 extremities grossly intact without deficits. No tremors.\par \par SKIN: Warm dry. No visible rashes or ulcers\par \par GAIT: Gait normal, Stability good\par \par  FOCUSED: -------done 2023------------------------------------------------\par \par Bladder: Nondistended, Nontender, No masses\par \par Ext Genitalia: Normal, VERY Atrophic\par \par Urethra: Normal, no masses, fixed and immobile\par \par 5/15/2023: Tight introitus with +pelvic mass. \par \par =======================================================================================\par \par \par ASSESSMENT and PLAN\par \par The patient is a 68 year female with a history of the following:\par \par 1. 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 We changed her dubose today.\par She finished Clark Cis and we reviewed her CT scans and images.\par \par We discussed cystectomy with urethrectomy and I am guessing she will need an anterior exenteration as well.\par We also discussed ileal conduit and how this is done. She is happy she is a candidate potentially for surgery and is ready and willing to do this.\par \par \par 2. Constipation\par I urged her to try miralax and mineral oil and enemas instead of senna (which can cause dependence). She agreed to that as well.  She will also drink a lot of water.\par \par \par -----------------------------------------------------------------------------------------------------\par LABS/TESTS Ordered: \par Meds Ordered:\par Follow up: see Dr. Keller\par -----------------------------------------------------------------------------------------------------\par \par The total amount of time I have personally spent preparing for this visit, reviewing the patient's test results, obtaining external history, ordering tests/medications, documenting clinical information, communicating with and counseling the patient/family and/or caregiver(s), and spent face to face with the patient explaining the above was  35 minutes.\par \par Thank you for allowing me to assist in the care of your patient. Should you have any questions please do not hesitate to reach out to me.\par \par \par Hayley Moulton MD\[facility] Associate \[facility] Department of Urology\[facility] Phone: [phone]\[facility] Fax: [phone]\[facility] \[facility] [address] [facility] [address]\[facility] \[facility]

## 2023-07-28 LAB
ALBUMIN SERPL ELPH-MCNC: 3.7 G/DL
ALP BLD-CCNC: 67 U/L
ALT SERPL-CCNC: 21 U/L
ANION GAP SERPL CALC-SCNC: 12 MMOL/L
AST SERPL-CCNC: 28 U/L
BILIRUB SERPL-MCNC: 0.5 MG/DL
BUN SERPL-MCNC: 10 MG/DL
CALCIUM SERPL-MCNC: 9.8 MG/DL
CHLORIDE SERPL-SCNC: 107 MMOL/L
CO2 SERPL-SCNC: 27 MMOL/L
CREAT SERPL-MCNC: 0.7 MG/DL
EGFR: 93 ML/MIN/1.73M2
FOLATE SERPL-MCNC: >20 NG/ML
GLUCOSE SERPL-MCNC: 93 MG/DL
POTASSIUM SERPL-SCNC: 3.9 MMOL/L
PROT SERPL-MCNC: 6.6 G/DL
SODIUM SERPL-SCNC: 146 MMOL/L
VIT B12 SERPL-MCNC: 548 PG/ML

## 2023-08-02 ENCOUNTER — APPOINTMENT (OUTPATIENT)
Dept: UROLOGY | Facility: CLINIC | Age: 68
End: 2023-08-02
Payer: MEDICARE

## 2023-08-02 ENCOUNTER — NON-APPOINTMENT (OUTPATIENT)
Age: 68
End: 2023-08-02

## 2023-08-02 VITALS
WEIGHT: 152 LBS | TEMPERATURE: 97.9 F | DIASTOLIC BLOOD PRESSURE: 79 MMHG | HEIGHT: 63 IN | BODY MASS INDEX: 26.93 KG/M2 | SYSTOLIC BLOOD PRESSURE: 146 MMHG

## 2023-08-02 PROCEDURE — 99215 OFFICE O/P EST HI 40 MIN: CPT | Mod: 57

## 2023-08-03 NOTE — HISTORY OF PRESENT ILLNESS
[FreeTextEntry1] : 67 yo female presents with muscle invasive bladder/urethral cancer  path is urothelial with glandural differentiation  here to discuss cystectomy and urethrectomy referred by Dr. Goldstein TURBT in March 2023 started Roane/Cis in April, completed 12 cycles on 7/6/23 feels well Farrell catheter in place since March 2023 last changed last week   CT abd/pelvis: 7/21/23-- decreased size of bladder mass, anteverted uterus, no LAD or metastatic disease

## 2023-08-03 NOTE — END OF VISIT
[FreeTextEntry3] : I, Dr. Keller, personally performed the evaluation and management (E/M) services for this established patient who presents today with (a) new problem(s)/exacerbation of (an) existing condition(s).  That E/M includes conducting the examination, assessing all new/exacerbated conditions, and establishing a new plan of care.  Today, our ACP, Keshia Prado, was here to observe the evaluation and management services for this new problem/exacerbated condition to be followed going forward. [Time Spent: ___ minutes] : I have spent [unfilled] minutes of time on the encounter.

## 2023-08-03 NOTE — ASSESSMENT
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 Triston BAKERT in March 2023 started Sherman/Cis in April, completed 12 cycles on 7/6/23 feels well Farrell catheter in place since March 2023 last changed last week  discussed cystectomy and urethectomy procedures in detail discussed ileal conduit urinary diversion risks of surgery including bleeding, infection, sepsis, urinary obstruction, urinary or bowel leak, DVT/PE, or other complications  plan: 1. surgery tentatively scheduled for 9/5 2. Farrell change end of August 3. medical and cardiac clearance

## 2023-08-28 ENCOUNTER — NON-APPOINTMENT (OUTPATIENT)
Age: 68
End: 2023-08-28

## 2023-08-30 ENCOUNTER — NON-APPOINTMENT (OUTPATIENT)
Age: 68
End: 2023-08-30

## 2023-08-31 ENCOUNTER — APPOINTMENT (OUTPATIENT)
Dept: UROLOGY | Facility: CLINIC | Age: 68
End: 2023-08-31
Payer: MEDICARE

## 2023-08-31 VITALS
HEART RATE: 63 BPM | SYSTOLIC BLOOD PRESSURE: 155 MMHG | WEIGHT: 152 LBS | BODY MASS INDEX: 26.93 KG/M2 | HEIGHT: 63 IN | DIASTOLIC BLOOD PRESSURE: 72 MMHG | TEMPERATURE: 98.4 F

## 2023-08-31 PROCEDURE — 51702 INSERT TEMP BLADDER CATH: CPT

## 2023-09-01 VITALS
DIASTOLIC BLOOD PRESSURE: 83 MMHG | HEIGHT: 63 IN | WEIGHT: 157.19 LBS | SYSTOLIC BLOOD PRESSURE: 152 MMHG | HEART RATE: 57 BPM | OXYGEN SATURATION: 100 % | RESPIRATION RATE: 18 BRPM | TEMPERATURE: 98 F

## 2023-09-01 RX ORDER — UBIDECARENONE 100 MG
1 CAPSULE ORAL
Qty: 0 | Refills: 0 | DISCHARGE

## 2023-09-01 RX ORDER — MULTIVIT-MIN/FERROUS GLUCONATE 9 MG/15 ML
1 LIQUID (ML) ORAL
Qty: 0 | Refills: 0 | DISCHARGE

## 2023-09-01 RX ORDER — CLONAZEPAM 1 MG
0.75 TABLET ORAL
Qty: 0 | Refills: 0 | DISCHARGE

## 2023-09-01 RX ORDER — FERROUS SULFATE 325(65) MG
1 TABLET ORAL
Qty: 0 | Refills: 0 | DISCHARGE

## 2023-09-01 RX ORDER — CALCIUM CARBONATE 500(1250)
0 TABLET ORAL
Qty: 0 | Refills: 0 | DISCHARGE

## 2023-09-01 NOTE — PATIENT PROFILE ADULT - FALL HARM RISK - HARM RISK INTERVENTIONS

## 2023-09-01 NOTE — PRE-OP CHECKLIST - TAMPON REMOVED
Problem: Occupational Therapy Goal  Goal: Occupational Therapy Goal  Description  Goals to be met by:  1/27/2020    Patient will increase functional independence with ADLs by performing:    UE Dressing with Set-up Assistance.  LE Dressing with Supervision.  Toileting from bedside commode with Stand-by Assistance for hygiene and clothing management.   Supine to sit with Stand-by Assistance.  Stand pivot transfers with Stand-by Assistance.  Toilet transfer to bedside commode with Contact Guard Assistance.     Outcome: Ongoing, Progressing   Patient's goals are set.   SAHARA Perez  1/12/2020     n/a

## 2023-09-04 ENCOUNTER — TRANSCRIPTION ENCOUNTER (OUTPATIENT)
Age: 68
End: 2023-09-04

## 2023-09-05 ENCOUNTER — INPATIENT (INPATIENT)
Facility: HOSPITAL | Age: 68
LOS: 7 days | Discharge: HOME | DRG: 654 | End: 2023-09-13
Attending: UROLOGY | Admitting: UROLOGY
Payer: MEDICARE

## 2023-09-05 ENCOUNTER — RESULT REVIEW (OUTPATIENT)
Age: 68
End: 2023-09-05

## 2023-09-05 ENCOUNTER — TRANSCRIPTION ENCOUNTER (OUTPATIENT)
Age: 68
End: 2023-09-05

## 2023-09-05 ENCOUNTER — APPOINTMENT (OUTPATIENT)
Dept: UROLOGY | Facility: AMBULATORY SURGERY CENTER | Age: 68
End: 2023-09-05

## 2023-09-05 DIAGNOSIS — Z90.79 ACQUIRED ABSENCE OF OTHER GENITAL ORGAN(S): Chronic | ICD-10-CM

## 2023-09-05 DIAGNOSIS — Z98.890 OTHER SPECIFIED POSTPROCEDURAL STATES: Chronic | ICD-10-CM

## 2023-09-05 DIAGNOSIS — C67.9 MALIGNANT NEOPLASM OF BLADDER, UNSPECIFIED: ICD-10-CM

## 2023-09-05 DIAGNOSIS — N75.0 CYST OF BARTHOLIN'S GLAND: Chronic | ICD-10-CM

## 2023-09-05 LAB
ANION GAP SERPL CALC-SCNC: 12 MMOL/L — SIGNIFICANT CHANGE UP (ref 5–17)
BASE EXCESS BLDA CALC-SCNC: 1 MMOL/L — SIGNIFICANT CHANGE UP (ref -2–3)
BLD GP AB SCN SERPL QL: NEGATIVE — SIGNIFICANT CHANGE UP
BUN SERPL-MCNC: 8 MG/DL — SIGNIFICANT CHANGE UP (ref 7–23)
CA-I BLDA-SCNC: 1.16 MMOL/L — SIGNIFICANT CHANGE UP (ref 1.15–1.33)
CALCIUM SERPL-MCNC: 8.6 MG/DL — SIGNIFICANT CHANGE UP (ref 8.4–10.5)
CHLORIDE SERPL-SCNC: 106 MMOL/L — SIGNIFICANT CHANGE UP (ref 96–108)
CO2 BLDA-SCNC: 27 MMOL/L — HIGH (ref 19–24)
CO2 SERPL-SCNC: 22 MMOL/L — SIGNIFICANT CHANGE UP (ref 22–31)
COHGB MFR BLDA: 2 % — SIGNIFICANT CHANGE UP
CREAT SERPL-MCNC: 0.62 MG/DL — SIGNIFICANT CHANGE UP (ref 0.5–1.3)
EGFR: 96 ML/MIN/1.73M2 — SIGNIFICANT CHANGE UP
GLUCOSE BLDA-MCNC: 117 MG/DL — HIGH (ref 70–99)
GLUCOSE BLDC GLUCOMTR-MCNC: 106 MG/DL — HIGH (ref 70–99)
GLUCOSE SERPL-MCNC: 146 MG/DL — HIGH (ref 70–99)
HCO3 BLDA-SCNC: 26 MMOL/L — SIGNIFICANT CHANGE UP (ref 21–28)
HCT VFR BLD CALC: 35.5 % — SIGNIFICANT CHANGE UP (ref 34.5–45)
HGB BLD-MCNC: 11.4 G/DL — LOW (ref 11.5–15.5)
HGB BLDA-MCNC: 12.1 G/DL — SIGNIFICANT CHANGE UP (ref 11.7–16.1)
MCHC RBC-ENTMCNC: 30.2 PG — SIGNIFICANT CHANGE UP (ref 27–34)
MCHC RBC-ENTMCNC: 32.1 GM/DL — SIGNIFICANT CHANGE UP (ref 32–36)
MCV RBC AUTO: 93.9 FL — SIGNIFICANT CHANGE UP (ref 80–100)
METHGB MFR BLDA: 0.7 % — SIGNIFICANT CHANGE UP
NRBC # BLD: 0 /100 WBCS — SIGNIFICANT CHANGE UP (ref 0–0)
OXYHGB MFR BLDA: 97.3 % — HIGH (ref 90–95)
PCO2 BLDA: 42 MMHG — SIGNIFICANT CHANGE UP (ref 32–45)
PH BLDA: 7.4 — SIGNIFICANT CHANGE UP (ref 7.35–7.45)
PLATELET # BLD AUTO: 218 K/UL — SIGNIFICANT CHANGE UP (ref 150–400)
PO2 BLDA: 270 MMHG — HIGH (ref 83–108)
POTASSIUM BLDA-SCNC: 3.4 MMOL/L — LOW (ref 3.5–5.1)
POTASSIUM SERPL-MCNC: 3.9 MMOL/L — SIGNIFICANT CHANGE UP (ref 3.5–5.3)
POTASSIUM SERPL-SCNC: 3.9 MMOL/L — SIGNIFICANT CHANGE UP (ref 3.5–5.3)
RBC # BLD: 3.78 M/UL — LOW (ref 3.8–5.2)
RBC # FLD: 13.6 % — SIGNIFICANT CHANGE UP (ref 10.3–14.5)
RH IG SCN BLD-IMP: POSITIVE — SIGNIFICANT CHANGE UP
SAO2 % BLDA: 100 % — HIGH (ref 94–98)
SODIUM BLDA-SCNC: 138 MMOL/L — SIGNIFICANT CHANGE UP (ref 136–145)
SODIUM SERPL-SCNC: 140 MMOL/L — SIGNIFICANT CHANGE UP (ref 135–145)
WBC # BLD: 12.57 K/UL — HIGH (ref 3.8–10.5)
WBC # FLD AUTO: 12.57 K/UL — HIGH (ref 3.8–10.5)

## 2023-09-05 PROCEDURE — 50820 CONSTRUCT BOWEL BLADDER: CPT | Mod: AS

## 2023-09-05 PROCEDURE — 51999 UNLISTED LAPS PX BLADDER: CPT | Mod: AS

## 2023-09-05 PROCEDURE — 58571 TLH W/T/O 250 G OR LESS: CPT | Mod: AS

## 2023-09-05 PROCEDURE — 88309 TISSUE EXAM BY PATHOLOGIST: CPT | Mod: 26

## 2023-09-05 PROCEDURE — 74018 RADEX ABDOMEN 1 VIEW: CPT | Mod: 26

## 2023-09-05 PROCEDURE — 58571 TLH W/T/O 250 G OR LESS: CPT

## 2023-09-05 PROCEDURE — 38571 LAPAROSCOPY LYMPHADENECTOMY: CPT | Mod: AS

## 2023-09-05 PROCEDURE — 88305 TISSUE EXAM BY PATHOLOGIST: CPT | Mod: 26

## 2023-09-05 PROCEDURE — 50820 CONSTRUCT BOWEL BLADDER: CPT

## 2023-09-05 PROCEDURE — 51999 UNLISTED LAPS PX BLADDER: CPT

## 2023-09-05 PROCEDURE — 38571 LAPAROSCOPY LYMPHADENECTOMY: CPT

## 2023-09-05 DEVICE — LIGATING CLIPS WECK HEMOLOK POLYMER LARGE (PURPLE) 6: Type: IMPLANTABLE DEVICE | Status: FUNCTIONAL

## 2023-09-05 DEVICE — STAPLER ETHICON PROXIMATE RELOAD 75MM BLUE: Type: IMPLANTABLE DEVICE | Status: FUNCTIONAL

## 2023-09-05 DEVICE — STAPLER COVIDIEN TA 60 BLUE RELOAD: Type: IMPLANTABLE DEVICE | Status: FUNCTIONAL

## 2023-09-05 DEVICE — SURGICEL POWDER 3 GRAMS: Type: IMPLANTABLE DEVICE | Status: FUNCTIONAL

## 2023-09-05 DEVICE — STAPLER COVIDIEN TA 60 BLUE: Type: IMPLANTABLE DEVICE | Status: FUNCTIONAL

## 2023-09-05 DEVICE — IMPLANTABLE DEVICE: Type: IMPLANTABLE DEVICE | Status: FUNCTIONAL

## 2023-09-05 DEVICE — SURGICEL 4 X 8": Type: IMPLANTABLE DEVICE | Status: FUNCTIONAL

## 2023-09-05 DEVICE — CLIP LIGATING VESOLOCK MED/LG GRN: Type: IMPLANTABLE DEVICE | Status: FUNCTIONAL

## 2023-09-05 DEVICE — STAPLER ETHICON PROXIMATE 75MM WITH BLUE RELOAD: Type: IMPLANTABLE DEVICE | Status: FUNCTIONAL

## 2023-09-05 RX ORDER — KETOROLAC TROMETHAMINE 30 MG/ML
15 SYRINGE (ML) INJECTION EVERY 6 HOURS
Refills: 0 | Status: DISCONTINUED | OUTPATIENT
Start: 2023-09-05 | End: 2023-09-07

## 2023-09-05 RX ORDER — CLONAZEPAM 1 MG
1 TABLET ORAL
Refills: 0 | DISCHARGE

## 2023-09-05 RX ORDER — ONDANSETRON 8 MG/1
4 TABLET, FILM COATED ORAL EVERY 6 HOURS
Refills: 0 | Status: COMPLETED | OUTPATIENT
Start: 2023-09-05 | End: 2023-09-11

## 2023-09-05 RX ORDER — DULOXETINE HYDROCHLORIDE 30 MG/1
20 CAPSULE, DELAYED RELEASE ORAL DAILY
Refills: 0 | Status: DISCONTINUED | OUTPATIENT
Start: 2023-09-05 | End: 2023-09-13

## 2023-09-05 RX ORDER — AMLODIPINE BESYLATE 2.5 MG/1
1 TABLET ORAL
Qty: 0 | Refills: 0 | DISCHARGE

## 2023-09-05 RX ORDER — ENOXAPARIN SODIUM 100 MG/ML
40 INJECTION SUBCUTANEOUS ONCE
Refills: 0 | Status: COMPLETED | OUTPATIENT
Start: 2023-09-05 | End: 2023-09-05

## 2023-09-05 RX ORDER — AMLODIPINE BESYLATE 2.5 MG/1
5 TABLET ORAL DAILY
Refills: 0 | Status: DISCONTINUED | OUTPATIENT
Start: 2023-09-06 | End: 2023-09-06

## 2023-09-05 RX ORDER — DULOXETINE HYDROCHLORIDE 30 MG/1
0 CAPSULE, DELAYED RELEASE ORAL
Qty: 0 | Refills: 0 | DISCHARGE

## 2023-09-05 RX ORDER — LIDOCAINE 4 G/100G
1 CREAM TOPICAL EVERY 24 HOURS
Refills: 0 | Status: DISCONTINUED | OUTPATIENT
Start: 2023-09-06 | End: 2023-09-09

## 2023-09-05 RX ORDER — ACETAMINOPHEN 500 MG
1000 TABLET ORAL EVERY 6 HOURS
Refills: 0 | Status: DISCONTINUED | OUTPATIENT
Start: 2023-09-05 | End: 2023-09-13

## 2023-09-05 RX ORDER — CLONAZEPAM 1 MG
1 TABLET ORAL DAILY
Refills: 0 | Status: DISCONTINUED | OUTPATIENT
Start: 2023-09-06 | End: 2023-09-12

## 2023-09-05 RX ORDER — SODIUM CHLORIDE 9 MG/ML
1000 INJECTION INTRAMUSCULAR; INTRAVENOUS; SUBCUTANEOUS
Refills: 0 | Status: DISCONTINUED | OUTPATIENT
Start: 2023-09-05 | End: 2023-09-07

## 2023-09-05 RX ORDER — KETOROLAC TROMETHAMINE 30 MG/ML
15 SYRINGE (ML) INJECTION ONCE
Refills: 0 | Status: DISCONTINUED | OUTPATIENT
Start: 2023-09-05 | End: 2023-09-05

## 2023-09-05 RX ORDER — OXYCODONE HYDROCHLORIDE 5 MG/1
5 TABLET ORAL EVERY 6 HOURS
Refills: 0 | Status: DISCONTINUED | OUTPATIENT
Start: 2023-09-05 | End: 2023-09-08

## 2023-09-05 RX ORDER — HEPARIN SODIUM 5000 [USP'U]/ML
5000 INJECTION INTRAVENOUS; SUBCUTANEOUS EVERY 8 HOURS
Refills: 0 | Status: DISCONTINUED | OUTPATIENT
Start: 2023-09-06 | End: 2023-09-13

## 2023-09-05 RX ORDER — CHOLECALCIFEROL (VITAMIN D3) 125 MCG
1 CAPSULE ORAL
Qty: 0 | Refills: 0 | DISCHARGE

## 2023-09-05 RX ORDER — ACETAMINOPHEN 500 MG
1000 TABLET ORAL ONCE
Refills: 0 | Status: COMPLETED | OUTPATIENT
Start: 2023-09-05 | End: 2023-09-05

## 2023-09-05 RX ADMIN — Medication 1000 MILLIGRAM(S): at 18:14

## 2023-09-05 RX ADMIN — LIDOCAINE 1 PATCH: 4 CREAM TOPICAL at 23:30

## 2023-09-05 RX ADMIN — Medication 1000 MILLIGRAM(S): at 19:00

## 2023-09-05 RX ADMIN — Medication 1000 MILLIGRAM(S): at 06:23

## 2023-09-05 RX ADMIN — Medication 1000 MILLIGRAM(S): at 23:54

## 2023-09-05 RX ADMIN — Medication 15 MILLIGRAM(S): at 17:00

## 2023-09-05 RX ADMIN — SODIUM CHLORIDE 110 MILLILITER(S): 9 INJECTION INTRAMUSCULAR; INTRAVENOUS; SUBCUTANEOUS at 16:35

## 2023-09-05 RX ADMIN — Medication 15 MILLIGRAM(S): at 21:40

## 2023-09-05 RX ADMIN — OXYCODONE HYDROCHLORIDE 5 MILLIGRAM(S): 5 TABLET ORAL at 16:35

## 2023-09-05 RX ADMIN — ENOXAPARIN SODIUM 40 MILLIGRAM(S): 100 INJECTION SUBCUTANEOUS at 06:23

## 2023-09-05 RX ADMIN — OXYCODONE HYDROCHLORIDE 5 MILLIGRAM(S): 5 TABLET ORAL at 17:00

## 2023-09-05 RX ADMIN — Medication 15 MILLIGRAM(S): at 21:25

## 2023-09-05 RX ADMIN — DULOXETINE HYDROCHLORIDE 20 MILLIGRAM(S): 30 CAPSULE, DELAYED RELEASE ORAL at 21:25

## 2023-09-05 RX ADMIN — Medication 15 MILLIGRAM(S): at 16:35

## 2023-09-05 NOTE — BRIEF OPERATIVE NOTE - NSICDXBRIEFPROCEDURE_GEN_ALL_CORE_FT
PROCEDURES:  Robot-assisted laparoscopic cystectomy 05-Sep-2023 12:03:23  Verena Machado  Ureteroileal conduit 05-Sep-2023 12:03:50  Verena Machado

## 2023-09-05 NOTE — ADVANCED PRACTICE NURSE CONSULT - ASSESSMENT
WOC nurse assessed the patient's abdomen in sitting, bending and standing positions. Deep crease noted adjacent to umbilicus. WOCN placed stoma marking on the right upper quadrant of abdomen, with the rectus muscle and away from the abdominal crease.   WOCN will follow the patient post operatively for ostomy teaching.

## 2023-09-05 NOTE — BRIEF OPERATIVE NOTE - SPECIMENS
bladder/urethra, anterior vaginal cuff/ureterus, ovaries, fallopian tubes, and b/l PLNDs. Distal ureteral margins

## 2023-09-05 NOTE — H&P ADULT - HISTORY OF PRESENT ILLNESS
68 year old female who presented with gross hematuria and urinary retention. CT urogram showed a 4 cm tumor in the bladder and extending into the mid urethra. No lymphadenopathy or distant metastatic disease on CT c/a/p. 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 Completed neoadjuvant chemotherapy with gemcitabine and cisplatin 4/27/23- 7/6/23

## 2023-09-06 LAB
ANION GAP SERPL CALC-SCNC: 8 MMOL/L — SIGNIFICANT CHANGE UP (ref 5–17)
BUN SERPL-MCNC: 11 MG/DL — SIGNIFICANT CHANGE UP (ref 7–23)
CALCIUM SERPL-MCNC: 8.6 MG/DL — SIGNIFICANT CHANGE UP (ref 8.4–10.5)
CHLORIDE SERPL-SCNC: 108 MMOL/L — SIGNIFICANT CHANGE UP (ref 96–108)
CO2 SERPL-SCNC: 24 MMOL/L — SIGNIFICANT CHANGE UP (ref 22–31)
CREAT SERPL-MCNC: 0.63 MG/DL — SIGNIFICANT CHANGE UP (ref 0.5–1.3)
EGFR: 95 ML/MIN/1.73M2 — SIGNIFICANT CHANGE UP
GLUCOSE SERPL-MCNC: 137 MG/DL — HIGH (ref 70–99)
HCT VFR BLD CALC: 34.7 % — SIGNIFICANT CHANGE UP (ref 34.5–45)
HGB BLD-MCNC: 11.3 G/DL — LOW (ref 11.5–15.5)
MCHC RBC-ENTMCNC: 31 PG — SIGNIFICANT CHANGE UP (ref 27–34)
MCHC RBC-ENTMCNC: 32.6 GM/DL — SIGNIFICANT CHANGE UP (ref 32–36)
MCV RBC AUTO: 95.3 FL — SIGNIFICANT CHANGE UP (ref 80–100)
NRBC # BLD: 0 /100 WBCS — SIGNIFICANT CHANGE UP (ref 0–0)
PLATELET # BLD AUTO: 202 K/UL — SIGNIFICANT CHANGE UP (ref 150–400)
POTASSIUM SERPL-MCNC: 4 MMOL/L — SIGNIFICANT CHANGE UP (ref 3.5–5.3)
POTASSIUM SERPL-SCNC: 4 MMOL/L — SIGNIFICANT CHANGE UP (ref 3.5–5.3)
RBC # BLD: 3.64 M/UL — LOW (ref 3.8–5.2)
RBC # FLD: 13.5 % — SIGNIFICANT CHANGE UP (ref 10.3–14.5)
SODIUM SERPL-SCNC: 140 MMOL/L — SIGNIFICANT CHANGE UP (ref 135–145)
WBC # BLD: 10.98 K/UL — HIGH (ref 3.8–10.5)
WBC # FLD AUTO: 10.98 K/UL — HIGH (ref 3.8–10.5)

## 2023-09-06 RX ORDER — HYDROMORPHONE HYDROCHLORIDE 2 MG/ML
0.2 INJECTION INTRAMUSCULAR; INTRAVENOUS; SUBCUTANEOUS ONCE
Refills: 0 | Status: DISCONTINUED | OUTPATIENT
Start: 2023-09-06 | End: 2023-09-06

## 2023-09-06 RX ORDER — AMLODIPINE BESYLATE 2.5 MG/1
5 TABLET ORAL AT BEDTIME
Refills: 0 | Status: DISCONTINUED | OUTPATIENT
Start: 2023-09-07 | End: 2023-09-13

## 2023-09-06 RX ADMIN — Medication 15 MILLIGRAM(S): at 01:23

## 2023-09-06 RX ADMIN — Medication 1000 MILLIGRAM(S): at 08:00

## 2023-09-06 RX ADMIN — LIDOCAINE 1 PATCH: 4 CREAM TOPICAL at 11:17

## 2023-09-06 RX ADMIN — HEPARIN SODIUM 5000 UNIT(S): 5000 INJECTION INTRAVENOUS; SUBCUTANEOUS at 21:28

## 2023-09-06 RX ADMIN — Medication 15 MILLIGRAM(S): at 03:40

## 2023-09-06 RX ADMIN — Medication 1000 MILLIGRAM(S): at 18:13

## 2023-09-06 RX ADMIN — Medication 1000 MILLIGRAM(S): at 11:31

## 2023-09-06 RX ADMIN — Medication 1000 MILLIGRAM(S): at 07:13

## 2023-09-06 RX ADMIN — Medication 1 MILLIGRAM(S): at 09:14

## 2023-09-06 RX ADMIN — Medication 1000 MILLIGRAM(S): at 12:30

## 2023-09-06 RX ADMIN — Medication 15 MILLIGRAM(S): at 03:11

## 2023-09-06 RX ADMIN — HEPARIN SODIUM 5000 UNIT(S): 5000 INJECTION INTRAVENOUS; SUBCUTANEOUS at 14:42

## 2023-09-06 RX ADMIN — HYDROMORPHONE HYDROCHLORIDE 0.2 MILLIGRAM(S): 2 INJECTION INTRAMUSCULAR; INTRAVENOUS; SUBCUTANEOUS at 22:07

## 2023-09-06 RX ADMIN — Medication 1000 MILLIGRAM(S): at 19:18

## 2023-09-06 RX ADMIN — Medication 15 MILLIGRAM(S): at 11:30

## 2023-09-06 RX ADMIN — HEPARIN SODIUM 5000 UNIT(S): 5000 INJECTION INTRAVENOUS; SUBCUTANEOUS at 07:14

## 2023-09-06 RX ADMIN — Medication 15 MILLIGRAM(S): at 18:15

## 2023-09-06 RX ADMIN — AMLODIPINE BESYLATE 5 MILLIGRAM(S): 2.5 TABLET ORAL at 07:13

## 2023-09-06 RX ADMIN — LIDOCAINE 1 PATCH: 4 CREAM TOPICAL at 07:00

## 2023-09-06 RX ADMIN — HYDROMORPHONE HYDROCHLORIDE 0.2 MILLIGRAM(S): 2 INJECTION INTRAMUSCULAR; INTRAVENOUS; SUBCUTANEOUS at 21:28

## 2023-09-06 RX ADMIN — Medication 1000 MILLIGRAM(S): at 00:30

## 2023-09-06 RX ADMIN — DULOXETINE HYDROCHLORIDE 20 MILLIGRAM(S): 30 CAPSULE, DELAYED RELEASE ORAL at 11:31

## 2023-09-06 RX ADMIN — Medication 15 MILLIGRAM(S): at 19:18

## 2023-09-06 NOTE — ADVANCED PRACTICE NURSE CONSULT - ASSESSMENT
Ileal conduit pink, budded, with rubber tubing and 2 stents in place, functioning with large amount of clear yellow blood tinged drainage. Peristomal skin slightly erythematous with medical adhesive related skin damage on lateral aspect. WOCN changed the pouching system while instructing the patient and spouse.  Patient's spouse also videotaped WOCN changing the pouching system.   WOCN cleansed peristomal skin with cleansing wipe, dried skin thoroughly, applied Cavilon skin prep to peristomal skin, measured stoma, cut skin barrier to accommodate the stoma, applied an ostomy ring around the stoma, opened antireflux valve in urostomy pouch to insert stents, then applied a 2 piece Teddy 1 3/4 inch, urostomy pouch. Attached pouch to bedside drainage.   Ostomy supplies left at the bedside.  Ileal conduit pink, budded, measuring 1 1/4 inch, with rubber tubing and 2 stents in place, functioning with large amount of clear yellow blood tinged drainage. Peristomal skin slightly erythematous with medical adhesive related skin damage on lateral aspect. WOCN changed the pouching system while instructing the patient and spouse.  Patient's spouse also videotaped WOCN changing the pouching system.   WOCN cleansed peristomal skin with cleansing wipe, dried skin thoroughly, applied Cavilon skin prep to peristomal skin, measured stoma, cut skin barrier to accommodate the stoma, applied an ostomy ring around the stoma, opened antireflux valve in urostomy pouch to insert stents, then applied a 2 piece Corral 1 3/4 inch, urostomy pouch. Attached pouch to bedside drainage.   Ostomy supplies left at the bedside.

## 2023-09-06 NOTE — ADVANCED PRACTICE NURSE CONSULT - REASON FOR CONSULT
[facility] nurse consult to instruct the patient and spouse on ostomy care. The patient is s/p robot-assisted laparoscopic cystectomy   and ureteroileal conduit 05-Sep-2023.

## 2023-09-07 LAB
ANION GAP SERPL CALC-SCNC: 8 MMOL/L — SIGNIFICANT CHANGE UP (ref 5–17)
BUN SERPL-MCNC: 9 MG/DL — SIGNIFICANT CHANGE UP (ref 7–23)
CALCIUM SERPL-MCNC: 8.6 MG/DL — SIGNIFICANT CHANGE UP (ref 8.4–10.5)
CHLORIDE SERPL-SCNC: 108 MMOL/L — SIGNIFICANT CHANGE UP (ref 96–108)
CO2 SERPL-SCNC: 25 MMOL/L — SIGNIFICANT CHANGE UP (ref 22–31)
CREAT FLD-MCNC: 0.53 MG/DL — SIGNIFICANT CHANGE UP
CREAT SERPL-MCNC: 0.52 MG/DL — SIGNIFICANT CHANGE UP (ref 0.5–1.3)
EGFR: 100 ML/MIN/1.73M2 — SIGNIFICANT CHANGE UP
GLUCOSE SERPL-MCNC: 121 MG/DL — HIGH (ref 70–99)
HCT VFR BLD CALC: 34.4 % — LOW (ref 34.5–45)
HGB BLD-MCNC: 10.8 G/DL — LOW (ref 11.5–15.5)
MAGNESIUM SERPL-MCNC: 2 MG/DL — SIGNIFICANT CHANGE UP (ref 1.6–2.6)
MCHC RBC-ENTMCNC: 30.5 PG — SIGNIFICANT CHANGE UP (ref 27–34)
MCHC RBC-ENTMCNC: 31.4 GM/DL — LOW (ref 32–36)
MCV RBC AUTO: 97.2 FL — SIGNIFICANT CHANGE UP (ref 80–100)
NRBC # BLD: 0 /100 WBCS — SIGNIFICANT CHANGE UP (ref 0–0)
PHOSPHATE SERPL-MCNC: 2.2 MG/DL — LOW (ref 2.5–4.5)
PLATELET # BLD AUTO: 197 K/UL — SIGNIFICANT CHANGE UP (ref 150–400)
POTASSIUM SERPL-MCNC: 3.5 MMOL/L — SIGNIFICANT CHANGE UP (ref 3.5–5.3)
POTASSIUM SERPL-SCNC: 3.5 MMOL/L — SIGNIFICANT CHANGE UP (ref 3.5–5.3)
RBC # BLD: 3.54 M/UL — LOW (ref 3.8–5.2)
RBC # FLD: 13.5 % — SIGNIFICANT CHANGE UP (ref 10.3–14.5)
SODIUM SERPL-SCNC: 141 MMOL/L — SIGNIFICANT CHANGE UP (ref 135–145)
SPECIMEN SOURCE FLD: SIGNIFICANT CHANGE UP
WBC # BLD: 11.87 K/UL — HIGH (ref 3.8–10.5)
WBC # FLD AUTO: 11.87 K/UL — HIGH (ref 3.8–10.5)

## 2023-09-07 RX ORDER — SODIUM,POTASSIUM PHOSPHATES 278-250MG
1 POWDER IN PACKET (EA) ORAL ONCE
Refills: 0 | Status: COMPLETED | OUTPATIENT
Start: 2023-09-07 | End: 2023-09-07

## 2023-09-07 RX ORDER — HYDROMORPHONE HYDROCHLORIDE 2 MG/ML
0.2 INJECTION INTRAMUSCULAR; INTRAVENOUS; SUBCUTANEOUS ONCE
Refills: 0 | Status: DISCONTINUED | OUTPATIENT
Start: 2023-09-07 | End: 2023-09-07

## 2023-09-07 RX ORDER — SODIUM CHLORIDE 9 MG/ML
3 INJECTION INTRAMUSCULAR; INTRAVENOUS; SUBCUTANEOUS EVERY 8 HOURS
Refills: 0 | Status: DISCONTINUED | OUTPATIENT
Start: 2023-09-07 | End: 2023-09-13

## 2023-09-07 RX ADMIN — AMLODIPINE BESYLATE 5 MILLIGRAM(S): 2.5 TABLET ORAL at 21:20

## 2023-09-07 RX ADMIN — HEPARIN SODIUM 5000 UNIT(S): 5000 INJECTION INTRAVENOUS; SUBCUTANEOUS at 13:23

## 2023-09-07 RX ADMIN — Medication 1000 MILLIGRAM(S): at 12:00

## 2023-09-07 RX ADMIN — Medication 15 MILLIGRAM(S): at 01:12

## 2023-09-07 RX ADMIN — OXYCODONE HYDROCHLORIDE 5 MILLIGRAM(S): 5 TABLET ORAL at 19:20

## 2023-09-07 RX ADMIN — Medication 1 MILLIGRAM(S): at 11:02

## 2023-09-07 RX ADMIN — LIDOCAINE 1 PATCH: 4 CREAM TOPICAL at 00:38

## 2023-09-07 RX ADMIN — Medication 1000 MILLIGRAM(S): at 01:21

## 2023-09-07 RX ADMIN — Medication 15 MILLIGRAM(S): at 00:46

## 2023-09-07 RX ADMIN — SODIUM CHLORIDE 3 MILLILITER(S): 9 INJECTION INTRAMUSCULAR; INTRAVENOUS; SUBCUTANEOUS at 14:37

## 2023-09-07 RX ADMIN — LIDOCAINE 1 PATCH: 4 CREAM TOPICAL at 12:00

## 2023-09-07 RX ADMIN — Medication 1 PACKET(S): at 09:13

## 2023-09-07 RX ADMIN — HYDROMORPHONE HYDROCHLORIDE 0.2 MILLIGRAM(S): 2 INJECTION INTRAMUSCULAR; INTRAVENOUS; SUBCUTANEOUS at 22:40

## 2023-09-07 RX ADMIN — SODIUM CHLORIDE 3 MILLILITER(S): 9 INJECTION INTRAMUSCULAR; INTRAVENOUS; SUBCUTANEOUS at 21:21

## 2023-09-07 RX ADMIN — HEPARIN SODIUM 5000 UNIT(S): 5000 INJECTION INTRAVENOUS; SUBCUTANEOUS at 21:20

## 2023-09-07 RX ADMIN — LIDOCAINE 1 PATCH: 4 CREAM TOPICAL at 06:13

## 2023-09-07 RX ADMIN — Medication 1000 MILLIGRAM(S): at 06:11

## 2023-09-07 RX ADMIN — Medication 1000 MILLIGRAM(S): at 00:47

## 2023-09-07 RX ADMIN — OXYCODONE HYDROCHLORIDE 5 MILLIGRAM(S): 5 TABLET ORAL at 13:37

## 2023-09-07 RX ADMIN — Medication 15 MILLIGRAM(S): at 11:14

## 2023-09-07 RX ADMIN — Medication 1000 MILLIGRAM(S): at 17:13

## 2023-09-07 RX ADMIN — OXYCODONE HYDROCHLORIDE 5 MILLIGRAM(S): 5 TABLET ORAL at 04:31

## 2023-09-07 RX ADMIN — HEPARIN SODIUM 5000 UNIT(S): 5000 INJECTION INTRAVENOUS; SUBCUTANEOUS at 05:10

## 2023-09-07 RX ADMIN — DULOXETINE HYDROCHLORIDE 20 MILLIGRAM(S): 30 CAPSULE, DELAYED RELEASE ORAL at 11:02

## 2023-09-07 RX ADMIN — OXYCODONE HYDROCHLORIDE 5 MILLIGRAM(S): 5 TABLET ORAL at 05:23

## 2023-09-07 RX ADMIN — LIDOCAINE 1 PATCH: 4 CREAM TOPICAL at 22:40

## 2023-09-07 RX ADMIN — OXYCODONE HYDROCHLORIDE 5 MILLIGRAM(S): 5 TABLET ORAL at 19:54

## 2023-09-07 RX ADMIN — Medication 1000 MILLIGRAM(S): at 17:47

## 2023-09-07 RX ADMIN — OXYCODONE HYDROCHLORIDE 5 MILLIGRAM(S): 5 TABLET ORAL at 13:21

## 2023-09-07 RX ADMIN — Medication 1000 MILLIGRAM(S): at 05:10

## 2023-09-07 RX ADMIN — Medication 15 MILLIGRAM(S): at 10:14

## 2023-09-07 RX ADMIN — Medication 1000 MILLIGRAM(S): at 11:02

## 2023-09-07 NOTE — DIETITIAN INITIAL EVALUATION ADULT - PERTINENT MEDS FT
MEDICATIONS  (STANDING):  acetaminophen     Tablet .. 1000 milliGRAM(s) Oral every 6 hours  amLODIPine   Tablet 5 milliGRAM(s) Oral at bedtime  clonazePAM  Tablet 1 milliGRAM(s) Oral daily  DULoxetine 20 milliGRAM(s) Oral daily  heparin   Injectable 5000 Unit(s) SubCutaneous every 8 hours  lidocaine   4% Patch 1 Patch Transdermal every 24 hours  sodium chloride 0.9% lock flush 3 milliLiter(s) IV Push every 8 hours    MEDICATIONS  (PRN):  ondansetron Injectable 4 milliGRAM(s) IV Push every 6 hours PRN Nausea and/or Vomiting  oxyCODONE    IR 5 milliGRAM(s) Oral every 6 hours PRN Severe Pain (7 - 10)

## 2023-09-07 NOTE — DIETITIAN INITIAL EVALUATION ADULT - ADD RECOMMEND
1. Continue with current diet order; advance to regular diet as able (monitor need for ONS) 2. Encourage pt to meet nutritional needs as able 3. Encourage adherence to diet education (reinforce as able) 4. Pain and bowel regimen per team 5. Will continue to assess/honor preferences as able

## 2023-09-07 NOTE — DIETITIAN INITIAL EVALUATION ADULT - PERTINENT LABORATORY DATA
09-07    141  |  108  |  9   ----------------------------<  121<H>  3.5   |  25  |  0.52    Ca    8.6      07 Sep 2023 06:48  Phos  2.2     09-07  Mg     2.0     09-07

## 2023-09-07 NOTE — DIETITIAN INITIAL EVALUATION ADULT - OTHER CALCULATIONS
Based on Standards of Care pt >% IBW thus ideal body weight used for all calculations. Needs adjusted for advanced age and post op.

## 2023-09-07 NOTE — DIETITIAN INITIAL EVALUATION ADULT - OTHER INFO
67 yo female s/p robotic assisted radical cystectomy , open ileal conduit 9/5.    Pt seen at bedside for initial assessment- on BIPAP at time of assessment. No n/v/d/c documented at this time. Confirms NKFA. Denies change in appetite PTA. Reports usual body weight 157 pounds (consistent with dosing weight 157 pounds). No edema documented at this time. No pressure ulcers documented at this time. Henrique score=18. Labs reviewed 9/7; pt hypophosphatemic. Observed pt with no overt signs of muscle or fat wasting. Based on ASPEN guidelines, pt does not meet criteria for malnutrition at this time. Provided pt with diet education regarding importance of meeting nutritional needs post operatively ; amenable to education. At time of RD interview patient on clear liquid diet; diet has since been advanced to full liquid diet. Discussed plan for diet advancement and adequate PO intake once diet advanced; amenable to education. Pt reports feeling hungry during hospital stay, able to complete 100% of meals. No cultural, ethnic, Spiritism food preferences noted. Made aware RD remains available. RD to follow up. See nutrition recommendations below.

## 2023-09-07 NOTE — ADVANCED PRACTICE NURSE CONSULT - REASON FOR CONSULT
WOC n urse follow up to continue ostomy teaching. Patient was sleeping and spouse was not at the bedside. WOCN will return to continue instruction.  WOC nurse follow up to continue ostomy teaching. Patient was sleeping and spouse was not at the bedside. WOCN will return to continue instruction.

## 2023-09-08 LAB
ANION GAP SERPL CALC-SCNC: 9 MMOL/L — SIGNIFICANT CHANGE UP (ref 5–17)
BUN SERPL-MCNC: 11 MG/DL — SIGNIFICANT CHANGE UP (ref 7–23)
CALCIUM SERPL-MCNC: 8.8 MG/DL — SIGNIFICANT CHANGE UP (ref 8.4–10.5)
CHLORIDE SERPL-SCNC: 105 MMOL/L — SIGNIFICANT CHANGE UP (ref 96–108)
CO2 SERPL-SCNC: 24 MMOL/L — SIGNIFICANT CHANGE UP (ref 22–31)
CREAT FLD-MCNC: 0.45 MG/DL — SIGNIFICANT CHANGE UP
CREAT SERPL-MCNC: 0.6 MG/DL — SIGNIFICANT CHANGE UP (ref 0.5–1.3)
EGFR: 96 ML/MIN/1.73M2 — SIGNIFICANT CHANGE UP
GLUCOSE BLDC GLUCOMTR-MCNC: 120 MG/DL — HIGH (ref 70–99)
GLUCOSE SERPL-MCNC: 128 MG/DL — HIGH (ref 70–99)
HCT VFR BLD CALC: 32.8 % — LOW (ref 34.5–45)
HGB BLD-MCNC: 11 G/DL — LOW (ref 11.5–15.5)
MAGNESIUM SERPL-MCNC: 1.8 MG/DL — SIGNIFICANT CHANGE UP (ref 1.6–2.6)
MCHC RBC-ENTMCNC: 31.6 PG — SIGNIFICANT CHANGE UP (ref 27–34)
MCHC RBC-ENTMCNC: 33.5 GM/DL — SIGNIFICANT CHANGE UP (ref 32–36)
MCV RBC AUTO: 94.3 FL — SIGNIFICANT CHANGE UP (ref 80–100)
NRBC # BLD: 0 /100 WBCS — SIGNIFICANT CHANGE UP (ref 0–0)
PHOSPHATE SERPL-MCNC: 3.2 MG/DL — SIGNIFICANT CHANGE UP (ref 2.5–4.5)
PLATELET # BLD AUTO: 200 K/UL — SIGNIFICANT CHANGE UP (ref 150–400)
POTASSIUM SERPL-MCNC: 3.5 MMOL/L — SIGNIFICANT CHANGE UP (ref 3.5–5.3)
POTASSIUM SERPL-SCNC: 3.5 MMOL/L — SIGNIFICANT CHANGE UP (ref 3.5–5.3)
RBC # BLD: 3.48 M/UL — LOW (ref 3.8–5.2)
RBC # FLD: 13.4 % — SIGNIFICANT CHANGE UP (ref 10.3–14.5)
SODIUM SERPL-SCNC: 138 MMOL/L — SIGNIFICANT CHANGE UP (ref 135–145)
WBC # BLD: 15.63 K/UL — HIGH (ref 3.8–10.5)
WBC # FLD AUTO: 15.63 K/UL — HIGH (ref 3.8–10.5)

## 2023-09-08 PROCEDURE — 71045 X-RAY EXAM CHEST 1 VIEW: CPT | Mod: 26

## 2023-09-08 RX ORDER — CEFTRIAXONE 500 MG/1
1000 INJECTION, POWDER, FOR SOLUTION INTRAMUSCULAR; INTRAVENOUS EVERY 24 HOURS
Refills: 0 | Status: DISCONTINUED | OUTPATIENT
Start: 2023-09-08 | End: 2023-09-13

## 2023-09-08 RX ORDER — OXYCODONE HYDROCHLORIDE 5 MG/1
5 TABLET ORAL EVERY 4 HOURS
Refills: 0 | Status: DISCONTINUED | OUTPATIENT
Start: 2023-09-08 | End: 2023-09-12

## 2023-09-08 RX ORDER — OXYCODONE HYDROCHLORIDE 5 MG/1
10 TABLET ORAL EVERY 6 HOURS
Refills: 0 | Status: DISCONTINUED | OUTPATIENT
Start: 2023-09-08 | End: 2023-09-12

## 2023-09-08 RX ADMIN — Medication 1000 MILLIGRAM(S): at 18:00

## 2023-09-08 RX ADMIN — Medication 1000 MILLIGRAM(S): at 00:30

## 2023-09-08 RX ADMIN — Medication 1000 MILLIGRAM(S): at 23:34

## 2023-09-08 RX ADMIN — LIDOCAINE 1 PATCH: 4 CREAM TOPICAL at 06:23

## 2023-09-08 RX ADMIN — DULOXETINE HYDROCHLORIDE 20 MILLIGRAM(S): 30 CAPSULE, DELAYED RELEASE ORAL at 11:38

## 2023-09-08 RX ADMIN — CEFTRIAXONE 100 MILLIGRAM(S): 500 INJECTION, POWDER, FOR SOLUTION INTRAMUSCULAR; INTRAVENOUS at 11:38

## 2023-09-08 RX ADMIN — HEPARIN SODIUM 5000 UNIT(S): 5000 INJECTION INTRAVENOUS; SUBCUTANEOUS at 14:21

## 2023-09-08 RX ADMIN — LIDOCAINE 1 PATCH: 4 CREAM TOPICAL at 23:33

## 2023-09-08 RX ADMIN — OXYCODONE HYDROCHLORIDE 5 MILLIGRAM(S): 5 TABLET ORAL at 07:41

## 2023-09-08 RX ADMIN — Medication 1000 MILLIGRAM(S): at 05:53

## 2023-09-08 RX ADMIN — OXYCODONE HYDROCHLORIDE 5 MILLIGRAM(S): 5 TABLET ORAL at 21:30

## 2023-09-08 RX ADMIN — HEPARIN SODIUM 5000 UNIT(S): 5000 INJECTION INTRAVENOUS; SUBCUTANEOUS at 21:31

## 2023-09-08 RX ADMIN — SODIUM CHLORIDE 3 MILLILITER(S): 9 INJECTION INTRAMUSCULAR; INTRAVENOUS; SUBCUTANEOUS at 14:30

## 2023-09-08 RX ADMIN — Medication 1000 MILLIGRAM(S): at 06:23

## 2023-09-08 RX ADMIN — OXYCODONE HYDROCHLORIDE 5 MILLIGRAM(S): 5 TABLET ORAL at 07:47

## 2023-09-08 RX ADMIN — Medication 1 MILLIGRAM(S): at 11:39

## 2023-09-08 RX ADMIN — Medication 1000 MILLIGRAM(S): at 01:44

## 2023-09-08 RX ADMIN — OXYCODONE HYDROCHLORIDE 5 MILLIGRAM(S): 5 TABLET ORAL at 23:00

## 2023-09-08 RX ADMIN — LIDOCAINE 1 PATCH: 4 CREAM TOPICAL at 10:00

## 2023-09-08 RX ADMIN — OXYCODONE HYDROCHLORIDE 5 MILLIGRAM(S): 5 TABLET ORAL at 02:32

## 2023-09-08 RX ADMIN — OXYCODONE HYDROCHLORIDE 5 MILLIGRAM(S): 5 TABLET ORAL at 05:51

## 2023-09-08 RX ADMIN — HEPARIN SODIUM 5000 UNIT(S): 5000 INJECTION INTRAVENOUS; SUBCUTANEOUS at 05:53

## 2023-09-08 RX ADMIN — Medication 1000 MILLIGRAM(S): at 17:01

## 2023-09-08 RX ADMIN — HYDROMORPHONE HYDROCHLORIDE 0.2 MILLIGRAM(S): 2 INJECTION INTRAMUSCULAR; INTRAVENOUS; SUBCUTANEOUS at 01:29

## 2023-09-08 RX ADMIN — AMLODIPINE BESYLATE 5 MILLIGRAM(S): 2.5 TABLET ORAL at 21:30

## 2023-09-08 RX ADMIN — OXYCODONE HYDROCHLORIDE 10 MILLIGRAM(S): 5 TABLET ORAL at 14:30

## 2023-09-08 RX ADMIN — SODIUM CHLORIDE 3 MILLILITER(S): 9 INJECTION INTRAMUSCULAR; INTRAVENOUS; SUBCUTANEOUS at 05:51

## 2023-09-08 RX ADMIN — Medication 1000 MILLIGRAM(S): at 12:28

## 2023-09-08 RX ADMIN — OXYCODONE HYDROCHLORIDE 10 MILLIGRAM(S): 5 TABLET ORAL at 13:37

## 2023-09-08 RX ADMIN — Medication 1000 MILLIGRAM(S): at 11:38

## 2023-09-08 RX ADMIN — SODIUM CHLORIDE 3 MILLILITER(S): 9 INJECTION INTRAMUSCULAR; INTRAVENOUS; SUBCUTANEOUS at 22:07

## 2023-09-08 NOTE — ADVANCED PRACTICE NURSE CONSULT - ASSESSMENT
Stoma pink, budded, measuring 1 1/8 inches, functioning with blood tinges urine. Peristomal skin medical adhesive related skin damage on lateral aspect healing. Two small peristomal blisters noted from 1-2 o'clock.    The patient's significant other, Isaac changed the urostomy pouching system with WOCN coaching while the patient observed.   WOCN applied stoma powder to medical adhesive related skin damage, sealed powder with skin prep while the patient and Isaac observed.  Isaac cleansed peristomal skin with cleansing wipe, dried skin thoroughly, applied skin prep to peristomal skin, measured stoma, cut skin barrier to accommodate the stoma, applied an ostomy ring around the stoma, opened antireflux valve in urostomy pouch to insert stents, then applied a 2 piece Teddy 1 3/4 inch, urostomy pouch. Attached pouch to bedside drainage.   WOCN provided the patient/Isaac with written instructions for changing the pouching system.   Ostomy supplies left at the bedside.  Stoma pink, budded, measuring 1 1/8 inches, functioning with blood tinged urine. Peristomal skin medical adhesive related skin damage on lateral aspect healing. Two small peristomal blisters noted from 1-2 o'clock.    The patient's significant other, Isaac changed the urostomy pouching system with WOCN coaching while the patient observed.   WOCN applied stoma powder to medical adhesive related skin damage, sealed powder with skin prep while the patient and Isaac observed.  Isaac cleansed peristomal skin with cleansing wipe, dried skin thoroughly, applied skin prep to peristomal skin, measured stoma, cut skin barrier to accommodate the stoma, applied an ostomy ring around the stoma, opened antireflux valve in urostomy pouch to insert stents, then applied a 2 piece Teddy 1 3/4 inch, urostomy pouch. Attached pouch to bedside drainage.   WOCN provided the patient/Isaac with written instructions for changing the pouching system.   Ostomy supplies left at the bedside.

## 2023-09-09 LAB
ANION GAP SERPL CALC-SCNC: 8 MMOL/L — SIGNIFICANT CHANGE UP (ref 5–17)
BASOPHILS # BLD AUTO: 0.03 K/UL — SIGNIFICANT CHANGE UP (ref 0–0.2)
BASOPHILS NFR BLD AUTO: 0.2 % — SIGNIFICANT CHANGE UP (ref 0–2)
BUN SERPL-MCNC: 8 MG/DL — SIGNIFICANT CHANGE UP (ref 7–23)
CALCIUM SERPL-MCNC: 8.8 MG/DL — SIGNIFICANT CHANGE UP (ref 8.4–10.5)
CHLORIDE SERPL-SCNC: 98 MMOL/L — SIGNIFICANT CHANGE UP (ref 96–108)
CO2 SERPL-SCNC: 26 MMOL/L — SIGNIFICANT CHANGE UP (ref 22–31)
CREAT SERPL-MCNC: 0.61 MG/DL — SIGNIFICANT CHANGE UP (ref 0.5–1.3)
EGFR: 96 ML/MIN/1.73M2 — SIGNIFICANT CHANGE UP
EOSINOPHIL # BLD AUTO: 0.23 K/UL — SIGNIFICANT CHANGE UP (ref 0–0.5)
EOSINOPHIL NFR BLD AUTO: 1.9 % — SIGNIFICANT CHANGE UP (ref 0–6)
GLUCOSE SERPL-MCNC: 108 MG/DL — HIGH (ref 70–99)
HCT VFR BLD CALC: 32.3 % — LOW (ref 34.5–45)
HGB BLD-MCNC: 10.5 G/DL — LOW (ref 11.5–15.5)
IMM GRANULOCYTES NFR BLD AUTO: 0.4 % — SIGNIFICANT CHANGE UP (ref 0–0.9)
LYMPHOCYTES # BLD AUTO: 17.7 % — SIGNIFICANT CHANGE UP (ref 13–44)
LYMPHOCYTES # BLD AUTO: 2.13 K/UL — SIGNIFICANT CHANGE UP (ref 1–3.3)
MAGNESIUM SERPL-MCNC: 1.9 MG/DL — SIGNIFICANT CHANGE UP (ref 1.6–2.6)
MCHC RBC-ENTMCNC: 30.3 PG — SIGNIFICANT CHANGE UP (ref 27–34)
MCHC RBC-ENTMCNC: 32.5 GM/DL — SIGNIFICANT CHANGE UP (ref 32–36)
MCV RBC AUTO: 93.4 FL — SIGNIFICANT CHANGE UP (ref 80–100)
MONOCYTES # BLD AUTO: 0.84 K/UL — SIGNIFICANT CHANGE UP (ref 0–0.9)
MONOCYTES NFR BLD AUTO: 7 % — SIGNIFICANT CHANGE UP (ref 2–14)
NEUTROPHILS # BLD AUTO: 8.75 K/UL — HIGH (ref 1.8–7.4)
NEUTROPHILS NFR BLD AUTO: 72.8 % — SIGNIFICANT CHANGE UP (ref 43–77)
NRBC # BLD: 0 /100 WBCS — SIGNIFICANT CHANGE UP (ref 0–0)
PHOSPHATE SERPL-MCNC: 3.3 MG/DL — SIGNIFICANT CHANGE UP (ref 2.5–4.5)
PLATELET # BLD AUTO: 219 K/UL — SIGNIFICANT CHANGE UP (ref 150–400)
POTASSIUM SERPL-MCNC: 3.6 MMOL/L — SIGNIFICANT CHANGE UP (ref 3.5–5.3)
POTASSIUM SERPL-SCNC: 3.6 MMOL/L — SIGNIFICANT CHANGE UP (ref 3.5–5.3)
RBC # BLD: 3.46 M/UL — LOW (ref 3.8–5.2)
RBC # FLD: 13.2 % — SIGNIFICANT CHANGE UP (ref 10.3–14.5)
SODIUM SERPL-SCNC: 132 MMOL/L — LOW (ref 135–145)
WBC # BLD: 12.03 K/UL — HIGH (ref 3.8–10.5)
WBC # FLD AUTO: 12.03 K/UL — HIGH (ref 3.8–10.5)

## 2023-09-09 RX ORDER — LIDOCAINE 4 G/100G
1 CREAM TOPICAL EVERY 24 HOURS
Refills: 0 | Status: DISCONTINUED | OUTPATIENT
Start: 2023-09-10 | End: 2023-09-13

## 2023-09-09 RX ORDER — SENNA PLUS 8.6 MG/1
2 TABLET ORAL AT BEDTIME
Refills: 0 | Status: DISCONTINUED | OUTPATIENT
Start: 2023-09-09 | End: 2023-09-11

## 2023-09-09 RX ORDER — HYDROMORPHONE HYDROCHLORIDE 2 MG/ML
0.2 INJECTION INTRAMUSCULAR; INTRAVENOUS; SUBCUTANEOUS ONCE
Refills: 0 | Status: DISCONTINUED | OUTPATIENT
Start: 2023-09-09 | End: 2023-09-09

## 2023-09-09 RX ADMIN — HEPARIN SODIUM 5000 UNIT(S): 5000 INJECTION INTRAVENOUS; SUBCUTANEOUS at 14:24

## 2023-09-09 RX ADMIN — HYDROMORPHONE HYDROCHLORIDE 0.2 MILLIGRAM(S): 2 INJECTION INTRAMUSCULAR; INTRAVENOUS; SUBCUTANEOUS at 17:37

## 2023-09-09 RX ADMIN — Medication 1000 MILLIGRAM(S): at 12:55

## 2023-09-09 RX ADMIN — Medication 1000 MILLIGRAM(S): at 05:47

## 2023-09-09 RX ADMIN — Medication 1000 MILLIGRAM(S): at 18:07

## 2023-09-09 RX ADMIN — Medication 1000 MILLIGRAM(S): at 17:37

## 2023-09-09 RX ADMIN — OXYCODONE HYDROCHLORIDE 5 MILLIGRAM(S): 5 TABLET ORAL at 02:05

## 2023-09-09 RX ADMIN — OXYCODONE HYDROCHLORIDE 5 MILLIGRAM(S): 5 TABLET ORAL at 02:35

## 2023-09-09 RX ADMIN — Medication 1000 MILLIGRAM(S): at 12:24

## 2023-09-09 RX ADMIN — OXYCODONE HYDROCHLORIDE 10 MILLIGRAM(S): 5 TABLET ORAL at 09:04

## 2023-09-09 RX ADMIN — SODIUM CHLORIDE 3 MILLILITER(S): 9 INJECTION INTRAMUSCULAR; INTRAVENOUS; SUBCUTANEOUS at 21:15

## 2023-09-09 RX ADMIN — OXYCODONE HYDROCHLORIDE 10 MILLIGRAM(S): 5 TABLET ORAL at 15:20

## 2023-09-09 RX ADMIN — Medication 1000 MILLIGRAM(S): at 00:00

## 2023-09-09 RX ADMIN — Medication 1000 MILLIGRAM(S): at 06:22

## 2023-09-09 RX ADMIN — HEPARIN SODIUM 5000 UNIT(S): 5000 INJECTION INTRAVENOUS; SUBCUTANEOUS at 05:47

## 2023-09-09 RX ADMIN — SENNA PLUS 2 TABLET(S): 8.6 TABLET ORAL at 21:08

## 2023-09-09 RX ADMIN — AMLODIPINE BESYLATE 5 MILLIGRAM(S): 2.5 TABLET ORAL at 21:08

## 2023-09-09 RX ADMIN — CEFTRIAXONE 100 MILLIGRAM(S): 500 INJECTION, POWDER, FOR SOLUTION INTRAMUSCULAR; INTRAVENOUS at 12:25

## 2023-09-09 RX ADMIN — OXYCODONE HYDROCHLORIDE 5 MILLIGRAM(S): 5 TABLET ORAL at 21:40

## 2023-09-09 RX ADMIN — SODIUM CHLORIDE 3 MILLILITER(S): 9 INJECTION INTRAMUSCULAR; INTRAVENOUS; SUBCUTANEOUS at 06:22

## 2023-09-09 RX ADMIN — HEPARIN SODIUM 5000 UNIT(S): 5000 INJECTION INTRAVENOUS; SUBCUTANEOUS at 21:09

## 2023-09-09 RX ADMIN — OXYCODONE HYDROCHLORIDE 5 MILLIGRAM(S): 5 TABLET ORAL at 14:55

## 2023-09-09 RX ADMIN — Medication 1 MILLIGRAM(S): at 12:25

## 2023-09-09 RX ADMIN — HYDROMORPHONE HYDROCHLORIDE 0.2 MILLIGRAM(S): 2 INJECTION INTRAMUSCULAR; INTRAVENOUS; SUBCUTANEOUS at 18:07

## 2023-09-09 RX ADMIN — OXYCODONE HYDROCHLORIDE 10 MILLIGRAM(S): 5 TABLET ORAL at 09:10

## 2023-09-09 RX ADMIN — Medication 5 MILLIGRAM(S): at 21:08

## 2023-09-09 RX ADMIN — OXYCODONE HYDROCHLORIDE 5 MILLIGRAM(S): 5 TABLET ORAL at 14:24

## 2023-09-09 RX ADMIN — LIDOCAINE 1 PATCH: 4 CREAM TOPICAL at 08:17

## 2023-09-09 RX ADMIN — OXYCODONE HYDROCHLORIDE 10 MILLIGRAM(S): 5 TABLET ORAL at 15:50

## 2023-09-09 RX ADMIN — OXYCODONE HYDROCHLORIDE 5 MILLIGRAM(S): 5 TABLET ORAL at 20:40

## 2023-09-09 RX ADMIN — DULOXETINE HYDROCHLORIDE 20 MILLIGRAM(S): 30 CAPSULE, DELAYED RELEASE ORAL at 12:25

## 2023-09-09 RX ADMIN — LIDOCAINE 1 PATCH: 4 CREAM TOPICAL at 12:35

## 2023-09-09 RX ADMIN — SODIUM CHLORIDE 3 MILLILITER(S): 9 INJECTION INTRAMUSCULAR; INTRAVENOUS; SUBCUTANEOUS at 14:38

## 2023-09-10 LAB
ANION GAP SERPL CALC-SCNC: 12 MMOL/L — SIGNIFICANT CHANGE UP (ref 5–17)
BASOPHILS # BLD AUTO: 0.03 K/UL — SIGNIFICANT CHANGE UP (ref 0–0.2)
BASOPHILS NFR BLD AUTO: 0.2 % — SIGNIFICANT CHANGE UP (ref 0–2)
BUN SERPL-MCNC: 13 MG/DL — SIGNIFICANT CHANGE UP (ref 7–23)
CALCIUM SERPL-MCNC: 9.3 MG/DL — SIGNIFICANT CHANGE UP (ref 8.4–10.5)
CHLORIDE SERPL-SCNC: 94 MMOL/L — LOW (ref 96–108)
CO2 SERPL-SCNC: 23 MMOL/L — SIGNIFICANT CHANGE UP (ref 22–31)
CREAT SERPL-MCNC: 0.72 MG/DL — SIGNIFICANT CHANGE UP (ref 0.5–1.3)
EGFR: 90 ML/MIN/1.73M2 — SIGNIFICANT CHANGE UP
EOSINOPHIL # BLD AUTO: 0.08 K/UL — SIGNIFICANT CHANGE UP (ref 0–0.5)
EOSINOPHIL NFR BLD AUTO: 0.6 % — SIGNIFICANT CHANGE UP (ref 0–6)
GLUCOSE SERPL-MCNC: 124 MG/DL — HIGH (ref 70–99)
HCT VFR BLD CALC: 36.2 % — SIGNIFICANT CHANGE UP (ref 34.5–45)
HGB BLD-MCNC: 11.8 G/DL — SIGNIFICANT CHANGE UP (ref 11.5–15.5)
IMM GRANULOCYTES NFR BLD AUTO: 0.5 % — SIGNIFICANT CHANGE UP (ref 0–0.9)
LYMPHOCYTES # BLD AUTO: 16.1 % — SIGNIFICANT CHANGE UP (ref 13–44)
LYMPHOCYTES # BLD AUTO: 2.03 K/UL — SIGNIFICANT CHANGE UP (ref 1–3.3)
MAGNESIUM SERPL-MCNC: 2.1 MG/DL — SIGNIFICANT CHANGE UP (ref 1.6–2.6)
MCHC RBC-ENTMCNC: 29.9 PG — SIGNIFICANT CHANGE UP (ref 27–34)
MCHC RBC-ENTMCNC: 32.6 GM/DL — SIGNIFICANT CHANGE UP (ref 32–36)
MCV RBC AUTO: 91.9 FL — SIGNIFICANT CHANGE UP (ref 80–100)
MONOCYTES # BLD AUTO: 0.84 K/UL — SIGNIFICANT CHANGE UP (ref 0–0.9)
MONOCYTES NFR BLD AUTO: 6.7 % — SIGNIFICANT CHANGE UP (ref 2–14)
NEUTROPHILS # BLD AUTO: 9.55 K/UL — HIGH (ref 1.8–7.4)
NEUTROPHILS NFR BLD AUTO: 75.9 % — SIGNIFICANT CHANGE UP (ref 43–77)
NRBC # BLD: 0 /100 WBCS — SIGNIFICANT CHANGE UP (ref 0–0)
PHOSPHATE SERPL-MCNC: 4.9 MG/DL — HIGH (ref 2.5–4.5)
PLATELET # BLD AUTO: 262 K/UL — SIGNIFICANT CHANGE UP (ref 150–400)
POTASSIUM SERPL-MCNC: 4 MMOL/L — SIGNIFICANT CHANGE UP (ref 3.5–5.3)
POTASSIUM SERPL-SCNC: 4 MMOL/L — SIGNIFICANT CHANGE UP (ref 3.5–5.3)
RBC # BLD: 3.94 M/UL — SIGNIFICANT CHANGE UP (ref 3.8–5.2)
RBC # FLD: 13.2 % — SIGNIFICANT CHANGE UP (ref 10.3–14.5)
SODIUM SERPL-SCNC: 129 MMOL/L — LOW (ref 135–145)
WBC # BLD: 12.59 K/UL — HIGH (ref 3.8–10.5)
WBC # FLD AUTO: 12.59 K/UL — HIGH (ref 3.8–10.5)

## 2023-09-10 RX ADMIN — SODIUM CHLORIDE 3 MILLILITER(S): 9 INJECTION INTRAMUSCULAR; INTRAVENOUS; SUBCUTANEOUS at 05:45

## 2023-09-10 RX ADMIN — SENNA PLUS 2 TABLET(S): 8.6 TABLET ORAL at 21:10

## 2023-09-10 RX ADMIN — OXYCODONE HYDROCHLORIDE 5 MILLIGRAM(S): 5 TABLET ORAL at 10:01

## 2023-09-10 RX ADMIN — CEFTRIAXONE 100 MILLIGRAM(S): 500 INJECTION, POWDER, FOR SOLUTION INTRAMUSCULAR; INTRAVENOUS at 11:44

## 2023-09-10 RX ADMIN — Medication 1 MILLIGRAM(S): at 11:43

## 2023-09-10 RX ADMIN — Medication 1000 MILLIGRAM(S): at 11:43

## 2023-09-10 RX ADMIN — HEPARIN SODIUM 5000 UNIT(S): 5000 INJECTION INTRAVENOUS; SUBCUTANEOUS at 14:22

## 2023-09-10 RX ADMIN — OXYCODONE HYDROCHLORIDE 5 MILLIGRAM(S): 5 TABLET ORAL at 03:52

## 2023-09-10 RX ADMIN — ONDANSETRON 4 MILLIGRAM(S): 8 TABLET, FILM COATED ORAL at 06:20

## 2023-09-10 RX ADMIN — Medication 1000 MILLIGRAM(S): at 18:25

## 2023-09-10 RX ADMIN — LIDOCAINE 1 PATCH: 4 CREAM TOPICAL at 23:45

## 2023-09-10 RX ADMIN — SODIUM CHLORIDE 3 MILLILITER(S): 9 INJECTION INTRAMUSCULAR; INTRAVENOUS; SUBCUTANEOUS at 21:58

## 2023-09-10 RX ADMIN — HEPARIN SODIUM 5000 UNIT(S): 5000 INJECTION INTRAVENOUS; SUBCUTANEOUS at 21:11

## 2023-09-10 RX ADMIN — LIDOCAINE 1 PATCH: 4 CREAM TOPICAL at 18:17

## 2023-09-10 RX ADMIN — Medication 10 MILLIGRAM(S): at 10:03

## 2023-09-10 RX ADMIN — LIDOCAINE 1 PATCH: 4 CREAM TOPICAL at 11:42

## 2023-09-10 RX ADMIN — SODIUM CHLORIDE 3 MILLILITER(S): 9 INJECTION INTRAMUSCULAR; INTRAVENOUS; SUBCUTANEOUS at 13:38

## 2023-09-10 RX ADMIN — HEPARIN SODIUM 5000 UNIT(S): 5000 INJECTION INTRAVENOUS; SUBCUTANEOUS at 05:53

## 2023-09-10 RX ADMIN — OXYCODONE HYDROCHLORIDE 5 MILLIGRAM(S): 5 TABLET ORAL at 10:31

## 2023-09-10 RX ADMIN — Medication 1000 MILLIGRAM(S): at 06:53

## 2023-09-10 RX ADMIN — Medication 1000 MILLIGRAM(S): at 05:53

## 2023-09-10 RX ADMIN — AMLODIPINE BESYLATE 5 MILLIGRAM(S): 2.5 TABLET ORAL at 21:10

## 2023-09-10 RX ADMIN — DULOXETINE HYDROCHLORIDE 20 MILLIGRAM(S): 30 CAPSULE, DELAYED RELEASE ORAL at 11:43

## 2023-09-10 RX ADMIN — OXYCODONE HYDROCHLORIDE 10 MILLIGRAM(S): 5 TABLET ORAL at 12:13

## 2023-09-10 RX ADMIN — OXYCODONE HYDROCHLORIDE 5 MILLIGRAM(S): 5 TABLET ORAL at 04:52

## 2023-09-10 RX ADMIN — OXYCODONE HYDROCHLORIDE 10 MILLIGRAM(S): 5 TABLET ORAL at 11:43

## 2023-09-10 RX ADMIN — Medication 1000 MILLIGRAM(S): at 12:15

## 2023-09-10 RX ADMIN — Medication 5 MILLIGRAM(S): at 21:10

## 2023-09-11 LAB
ANION GAP SERPL CALC-SCNC: 9 MMOL/L — SIGNIFICANT CHANGE UP (ref 5–17)
BUN SERPL-MCNC: 13 MG/DL — SIGNIFICANT CHANGE UP (ref 7–23)
CALCIUM SERPL-MCNC: 9.2 MG/DL — SIGNIFICANT CHANGE UP (ref 8.4–10.5)
CHLORIDE SERPL-SCNC: 94 MMOL/L — LOW (ref 96–108)
CO2 SERPL-SCNC: 26 MMOL/L — SIGNIFICANT CHANGE UP (ref 22–31)
CREAT SERPL-MCNC: 0.63 MG/DL — SIGNIFICANT CHANGE UP (ref 0.5–1.3)
EGFR: 95 ML/MIN/1.73M2 — SIGNIFICANT CHANGE UP
GLUCOSE SERPL-MCNC: 127 MG/DL — HIGH (ref 70–99)
HCT VFR BLD CALC: 32.3 % — LOW (ref 34.5–45)
HGB BLD-MCNC: 10.8 G/DL — LOW (ref 11.5–15.5)
MAGNESIUM SERPL-MCNC: 2.1 MG/DL — SIGNIFICANT CHANGE UP (ref 1.6–2.6)
MCHC RBC-ENTMCNC: 30 PG — SIGNIFICANT CHANGE UP (ref 27–34)
MCHC RBC-ENTMCNC: 33.4 GM/DL — SIGNIFICANT CHANGE UP (ref 32–36)
MCV RBC AUTO: 89.7 FL — SIGNIFICANT CHANGE UP (ref 80–100)
NRBC # BLD: 0 /100 WBCS — SIGNIFICANT CHANGE UP (ref 0–0)
PHOSPHATE SERPL-MCNC: 4 MG/DL — SIGNIFICANT CHANGE UP (ref 2.5–4.5)
PLATELET # BLD AUTO: 302 K/UL — SIGNIFICANT CHANGE UP (ref 150–400)
POTASSIUM SERPL-MCNC: 3.8 MMOL/L — SIGNIFICANT CHANGE UP (ref 3.5–5.3)
POTASSIUM SERPL-SCNC: 3.8 MMOL/L — SIGNIFICANT CHANGE UP (ref 3.5–5.3)
RBC # BLD: 3.6 M/UL — LOW (ref 3.8–5.2)
RBC # FLD: 13.2 % — SIGNIFICANT CHANGE UP (ref 10.3–14.5)
SODIUM SERPL-SCNC: 129 MMOL/L — LOW (ref 135–145)
WBC # BLD: 10.78 K/UL — HIGH (ref 3.8–10.5)
WBC # FLD AUTO: 10.78 K/UL — HIGH (ref 3.8–10.5)

## 2023-09-11 RX ORDER — SODIUM CHLORIDE 9 MG/ML
1000 INJECTION INTRAMUSCULAR; INTRAVENOUS; SUBCUTANEOUS ONCE
Refills: 0 | Status: COMPLETED | OUTPATIENT
Start: 2023-09-11 | End: 2023-09-11

## 2023-09-11 RX ORDER — ONDANSETRON 8 MG/1
4 TABLET, FILM COATED ORAL EVERY 6 HOURS
Refills: 0 | Status: DISCONTINUED | OUTPATIENT
Start: 2023-09-11 | End: 2023-09-13

## 2023-09-11 RX ORDER — SIMETHICONE 80 MG/1
80 TABLET, CHEWABLE ORAL THREE TIMES A DAY
Refills: 0 | Status: DISCONTINUED | OUTPATIENT
Start: 2023-09-11 | End: 2023-09-13

## 2023-09-11 RX ADMIN — SODIUM CHLORIDE 3 MILLILITER(S): 9 INJECTION INTRAMUSCULAR; INTRAVENOUS; SUBCUTANEOUS at 06:57

## 2023-09-11 RX ADMIN — Medication 1000 MILLIGRAM(S): at 00:54

## 2023-09-11 RX ADMIN — ONDANSETRON 4 MILLIGRAM(S): 8 TABLET, FILM COATED ORAL at 23:02

## 2023-09-11 RX ADMIN — OXYCODONE HYDROCHLORIDE 10 MILLIGRAM(S): 5 TABLET ORAL at 06:57

## 2023-09-11 RX ADMIN — CEFTRIAXONE 100 MILLIGRAM(S): 500 INJECTION, POWDER, FOR SOLUTION INTRAMUSCULAR; INTRAVENOUS at 11:12

## 2023-09-11 RX ADMIN — SIMETHICONE 80 MILLIGRAM(S): 80 TABLET, CHEWABLE ORAL at 23:02

## 2023-09-11 RX ADMIN — LIDOCAINE 1 PATCH: 4 CREAM TOPICAL at 17:28

## 2023-09-11 RX ADMIN — OXYCODONE HYDROCHLORIDE 10 MILLIGRAM(S): 5 TABLET ORAL at 20:46

## 2023-09-11 RX ADMIN — DULOXETINE HYDROCHLORIDE 20 MILLIGRAM(S): 30 CAPSULE, DELAYED RELEASE ORAL at 12:04

## 2023-09-11 RX ADMIN — Medication 1000 MILLIGRAM(S): at 06:57

## 2023-09-11 RX ADMIN — Medication 1000 MILLIGRAM(S): at 18:03

## 2023-09-11 RX ADMIN — HEPARIN SODIUM 5000 UNIT(S): 5000 INJECTION INTRAVENOUS; SUBCUTANEOUS at 06:07

## 2023-09-11 RX ADMIN — OXYCODONE HYDROCHLORIDE 10 MILLIGRAM(S): 5 TABLET ORAL at 14:20

## 2023-09-11 RX ADMIN — Medication 1000 MILLIGRAM(S): at 23:02

## 2023-09-11 RX ADMIN — OXYCODONE HYDROCHLORIDE 10 MILLIGRAM(S): 5 TABLET ORAL at 22:26

## 2023-09-11 RX ADMIN — SODIUM CHLORIDE 1000 MILLILITER(S): 9 INJECTION INTRAMUSCULAR; INTRAVENOUS; SUBCUTANEOUS at 06:30

## 2023-09-11 RX ADMIN — Medication 1000 MILLIGRAM(S): at 12:04

## 2023-09-11 RX ADMIN — Medication 1000 MILLIGRAM(S): at 00:23

## 2023-09-11 RX ADMIN — HEPARIN SODIUM 5000 UNIT(S): 5000 INJECTION INTRAVENOUS; SUBCUTANEOUS at 13:45

## 2023-09-11 RX ADMIN — Medication 1000 MILLIGRAM(S): at 06:07

## 2023-09-11 RX ADMIN — LIDOCAINE 1 PATCH: 4 CREAM TOPICAL at 11:12

## 2023-09-11 RX ADMIN — SODIUM CHLORIDE 3 MILLILITER(S): 9 INJECTION INTRAMUSCULAR; INTRAVENOUS; SUBCUTANEOUS at 20:42

## 2023-09-11 RX ADMIN — HEPARIN SODIUM 5000 UNIT(S): 5000 INJECTION INTRAVENOUS; SUBCUTANEOUS at 20:46

## 2023-09-11 RX ADMIN — SODIUM CHLORIDE 3 MILLILITER(S): 9 INJECTION INTRAMUSCULAR; INTRAVENOUS; SUBCUTANEOUS at 16:21

## 2023-09-11 RX ADMIN — Medication 1 MILLIGRAM(S): at 11:12

## 2023-09-11 RX ADMIN — Medication 5 MILLIGRAM(S): at 20:46

## 2023-09-11 RX ADMIN — AMLODIPINE BESYLATE 5 MILLIGRAM(S): 2.5 TABLET ORAL at 20:46

## 2023-09-11 RX ADMIN — OXYCODONE HYDROCHLORIDE 10 MILLIGRAM(S): 5 TABLET ORAL at 04:22

## 2023-09-11 RX ADMIN — Medication 1000 MILLIGRAM(S): at 18:26

## 2023-09-11 RX ADMIN — Medication 1000 MILLIGRAM(S): at 13:30

## 2023-09-11 RX ADMIN — OXYCODONE HYDROCHLORIDE 10 MILLIGRAM(S): 5 TABLET ORAL at 13:46

## 2023-09-11 NOTE — ADVANCED PRACTICE NURSE CONSULT - REASON FOR CONSULT
WO nurse follow up to continue ostomy teaching. The patient stated she didn't sleep well last night and wanted to sleep. Patient requested WOCN return tomorrow for additional ostomy teaching prior to her discharge.

## 2023-09-12 ENCOUNTER — TRANSCRIPTION ENCOUNTER (OUTPATIENT)
Age: 68
End: 2023-09-12

## 2023-09-12 LAB
ANION GAP SERPL CALC-SCNC: 9 MMOL/L — SIGNIFICANT CHANGE UP (ref 5–17)
BUN SERPL-MCNC: 10 MG/DL — SIGNIFICANT CHANGE UP (ref 7–23)
CALCIUM SERPL-MCNC: 9.2 MG/DL — SIGNIFICANT CHANGE UP (ref 8.4–10.5)
CHLORIDE SERPL-SCNC: 97 MMOL/L — SIGNIFICANT CHANGE UP (ref 96–108)
CO2 SERPL-SCNC: 28 MMOL/L — SIGNIFICANT CHANGE UP (ref 22–31)
CREAT SERPL-MCNC: 0.56 MG/DL — SIGNIFICANT CHANGE UP (ref 0.5–1.3)
EGFR: 98 ML/MIN/1.73M2 — SIGNIFICANT CHANGE UP
GLUCOSE SERPL-MCNC: 123 MG/DL — HIGH (ref 70–99)
HCT VFR BLD CALC: 32 % — LOW (ref 34.5–45)
HGB BLD-MCNC: 10.5 G/DL — LOW (ref 11.5–15.5)
MAGNESIUM SERPL-MCNC: 2 MG/DL — SIGNIFICANT CHANGE UP (ref 1.6–2.6)
MCHC RBC-ENTMCNC: 30.3 PG — SIGNIFICANT CHANGE UP (ref 27–34)
MCHC RBC-ENTMCNC: 32.8 GM/DL — SIGNIFICANT CHANGE UP (ref 32–36)
MCV RBC AUTO: 92.2 FL — SIGNIFICANT CHANGE UP (ref 80–100)
NRBC # BLD: 0 /100 WBCS — SIGNIFICANT CHANGE UP (ref 0–0)
PHOSPHATE SERPL-MCNC: 3.9 MG/DL — SIGNIFICANT CHANGE UP (ref 2.5–4.5)
PLATELET # BLD AUTO: 339 K/UL — SIGNIFICANT CHANGE UP (ref 150–400)
POTASSIUM SERPL-MCNC: 4 MMOL/L — SIGNIFICANT CHANGE UP (ref 3.5–5.3)
POTASSIUM SERPL-SCNC: 4 MMOL/L — SIGNIFICANT CHANGE UP (ref 3.5–5.3)
RBC # BLD: 3.47 M/UL — LOW (ref 3.8–5.2)
RBC # FLD: 13.4 % — SIGNIFICANT CHANGE UP (ref 10.3–14.5)
SODIUM SERPL-SCNC: 134 MMOL/L — LOW (ref 135–145)
SURGICAL PATHOLOGY STUDY: SIGNIFICANT CHANGE UP
WBC # BLD: 10.29 K/UL — SIGNIFICANT CHANGE UP (ref 3.8–10.5)
WBC # FLD AUTO: 10.29 K/UL — SIGNIFICANT CHANGE UP (ref 3.8–10.5)

## 2023-09-12 RX ORDER — FAMOTIDINE 10 MG/ML
20 INJECTION INTRAVENOUS
Refills: 0 | Status: DISCONTINUED | OUTPATIENT
Start: 2023-09-12 | End: 2023-09-13

## 2023-09-12 RX ORDER — CLONAZEPAM 1 MG
1 TABLET ORAL DAILY
Refills: 0 | Status: DISCONTINUED | OUTPATIENT
Start: 2023-09-13 | End: 2023-09-13

## 2023-09-12 RX ORDER — SENNA PLUS 8.6 MG/1
2 TABLET ORAL AT BEDTIME
Refills: 0 | Status: DISCONTINUED | OUTPATIENT
Start: 2023-09-12 | End: 2023-09-13

## 2023-09-12 RX ORDER — OXYCODONE HYDROCHLORIDE 5 MG/1
5 TABLET ORAL EVERY 6 HOURS
Refills: 0 | Status: DISCONTINUED | OUTPATIENT
Start: 2023-09-12 | End: 2023-09-13

## 2023-09-12 RX ADMIN — HEPARIN SODIUM 5000 UNIT(S): 5000 INJECTION INTRAVENOUS; SUBCUTANEOUS at 21:37

## 2023-09-12 RX ADMIN — Medication 5 MILLIGRAM(S): at 21:37

## 2023-09-12 RX ADMIN — FAMOTIDINE 20 MILLIGRAM(S): 10 INJECTION INTRAVENOUS at 18:06

## 2023-09-12 RX ADMIN — ONDANSETRON 4 MILLIGRAM(S): 8 TABLET, FILM COATED ORAL at 05:05

## 2023-09-12 RX ADMIN — AMLODIPINE BESYLATE 5 MILLIGRAM(S): 2.5 TABLET ORAL at 21:37

## 2023-09-12 RX ADMIN — Medication 1000 MILLIGRAM(S): at 11:06

## 2023-09-12 RX ADMIN — Medication 1000 MILLIGRAM(S): at 18:40

## 2023-09-12 RX ADMIN — OXYCODONE HYDROCHLORIDE 5 MILLIGRAM(S): 5 TABLET ORAL at 22:59

## 2023-09-12 RX ADMIN — Medication 10 MILLIGRAM(S): at 06:35

## 2023-09-12 RX ADMIN — DULOXETINE HYDROCHLORIDE 20 MILLIGRAM(S): 30 CAPSULE, DELAYED RELEASE ORAL at 11:06

## 2023-09-12 RX ADMIN — LIDOCAINE 1 PATCH: 4 CREAM TOPICAL at 22:59

## 2023-09-12 RX ADMIN — LIDOCAINE 1 PATCH: 4 CREAM TOPICAL at 11:05

## 2023-09-12 RX ADMIN — CEFTRIAXONE 100 MILLIGRAM(S): 500 INJECTION, POWDER, FOR SOLUTION INTRAMUSCULAR; INTRAVENOUS at 11:04

## 2023-09-12 RX ADMIN — SODIUM CHLORIDE 3 MILLILITER(S): 9 INJECTION INTRAMUSCULAR; INTRAVENOUS; SUBCUTANEOUS at 22:59

## 2023-09-12 RX ADMIN — Medication 1000 MILLIGRAM(S): at 18:06

## 2023-09-12 RX ADMIN — Medication 1000 MILLIGRAM(S): at 06:48

## 2023-09-12 RX ADMIN — Medication 1 MILLIGRAM(S): at 11:06

## 2023-09-12 RX ADMIN — SODIUM CHLORIDE 3 MILLILITER(S): 9 INJECTION INTRAMUSCULAR; INTRAVENOUS; SUBCUTANEOUS at 06:24

## 2023-09-12 RX ADMIN — SENNA PLUS 2 TABLET(S): 8.6 TABLET ORAL at 21:35

## 2023-09-12 RX ADMIN — LIDOCAINE 1 PATCH: 4 CREAM TOPICAL at 18:40

## 2023-09-12 RX ADMIN — SODIUM CHLORIDE 3 MILLILITER(S): 9 INJECTION INTRAMUSCULAR; INTRAVENOUS; SUBCUTANEOUS at 13:38

## 2023-09-12 RX ADMIN — OXYCODONE HYDROCHLORIDE 5 MILLIGRAM(S): 5 TABLET ORAL at 21:37

## 2023-09-12 RX ADMIN — HEPARIN SODIUM 5000 UNIT(S): 5000 INJECTION INTRAVENOUS; SUBCUTANEOUS at 13:47

## 2023-09-12 RX ADMIN — HEPARIN SODIUM 5000 UNIT(S): 5000 INJECTION INTRAVENOUS; SUBCUTANEOUS at 05:06

## 2023-09-12 RX ADMIN — Medication 1000 MILLIGRAM(S): at 12:08

## 2023-09-12 RX ADMIN — LIDOCAINE 1 PATCH: 4 CREAM TOPICAL at 00:00

## 2023-09-12 RX ADMIN — Medication 1000 MILLIGRAM(S): at 23:08

## 2023-09-12 RX ADMIN — Medication 1000 MILLIGRAM(S): at 00:00

## 2023-09-12 NOTE — PROVIDER CONTACT NOTE (OTHER) - RECOMMENDATIONS
Urology team notified. SHARAD Alejandro notified & aware. Says this is normal for the ileoconduit
SHARAD Metz made aware will come examine PT at bedside

## 2023-09-12 NOTE — PROVIDER CONTACT NOTE (OTHER) - ACTION/TREATMENT ORDERED:
no intervention at this time per SHARAD Metz,  continue to monitor PT as per SHARAD Metz
No interventions ordered at this time

## 2023-09-12 NOTE — DISCHARGE NOTE NURSING/CASE MANAGEMENT/SOCIAL WORK - PATIENT PORTAL LINK FT
You can access the FollowMyHealth Patient Portal offered by [facility] by registering at the following website: [email or web address]. By joining [employer]’s FollowMyHealth portal, you will also be able to view your health information using other applications (apps) compatible with our system.

## 2023-09-12 NOTE — DISCHARGE NOTE NURSING/CASE MANAGEMENT/SOCIAL WORK - NSDCPEFALRISK_GEN_ALL_CORE
For information on Fall & Injury Prevention, visit: [email or web address] OR  [email or web address] OR  https://www.cdc.gov/steadi/patient.html

## 2023-09-13 ENCOUNTER — TRANSCRIPTION ENCOUNTER (OUTPATIENT)
Age: 68
End: 2023-09-13

## 2023-09-13 VITALS
SYSTOLIC BLOOD PRESSURE: 130 MMHG | HEART RATE: 83 BPM | RESPIRATION RATE: 18 BRPM | DIASTOLIC BLOOD PRESSURE: 77 MMHG | OXYGEN SATURATION: 98 %

## 2023-09-13 LAB
ANION GAP SERPL CALC-SCNC: 9 MMOL/L — SIGNIFICANT CHANGE UP (ref 5–17)
BUN SERPL-MCNC: 12 MG/DL — SIGNIFICANT CHANGE UP (ref 7–23)
CALCIUM SERPL-MCNC: 8.7 MG/DL — SIGNIFICANT CHANGE UP (ref 8.4–10.5)
CHLORIDE SERPL-SCNC: 101 MMOL/L — SIGNIFICANT CHANGE UP (ref 96–108)
CO2 SERPL-SCNC: 24 MMOL/L — SIGNIFICANT CHANGE UP (ref 22–31)
CREAT SERPL-MCNC: 0.56 MG/DL — SIGNIFICANT CHANGE UP (ref 0.5–1.3)
EGFR: 98 ML/MIN/1.73M2 — SIGNIFICANT CHANGE UP
GLUCOSE SERPL-MCNC: 109 MG/DL — HIGH (ref 70–99)
HCT VFR BLD CALC: 31.1 % — LOW (ref 34.5–45)
HGB BLD-MCNC: 10 G/DL — LOW (ref 11.5–15.5)
MCHC RBC-ENTMCNC: 30.4 PG — SIGNIFICANT CHANGE UP (ref 27–34)
MCHC RBC-ENTMCNC: 32.2 GM/DL — SIGNIFICANT CHANGE UP (ref 32–36)
MCV RBC AUTO: 94.5 FL — SIGNIFICANT CHANGE UP (ref 80–100)
NRBC # BLD: 0 /100 WBCS — SIGNIFICANT CHANGE UP (ref 0–0)
PLATELET # BLD AUTO: 340 K/UL — SIGNIFICANT CHANGE UP (ref 150–400)
POTASSIUM SERPL-MCNC: 4.1 MMOL/L — SIGNIFICANT CHANGE UP (ref 3.5–5.3)
POTASSIUM SERPL-SCNC: 4.1 MMOL/L — SIGNIFICANT CHANGE UP (ref 3.5–5.3)
RBC # BLD: 3.29 M/UL — LOW (ref 3.8–5.2)
RBC # FLD: 13.6 % — SIGNIFICANT CHANGE UP (ref 10.3–14.5)
SODIUM SERPL-SCNC: 134 MMOL/L — LOW (ref 135–145)
WBC # BLD: 10.39 K/UL — SIGNIFICANT CHANGE UP (ref 3.8–10.5)
WBC # FLD AUTO: 10.39 K/UL — SIGNIFICANT CHANGE UP (ref 3.8–10.5)

## 2023-09-13 PROCEDURE — 85025 COMPLETE CBC W/AUTO DIFF WBC: CPT

## 2023-09-13 PROCEDURE — 82330 ASSAY OF CALCIUM: CPT

## 2023-09-13 PROCEDURE — 74018 RADEX ABDOMEN 1 VIEW: CPT

## 2023-09-13 PROCEDURE — 85027 COMPLETE CBC AUTOMATED: CPT

## 2023-09-13 PROCEDURE — 71045 X-RAY EXAM CHEST 1 VIEW: CPT

## 2023-09-13 PROCEDURE — 84295 ASSAY OF SERUM SODIUM: CPT

## 2023-09-13 PROCEDURE — C2617: CPT

## 2023-09-13 PROCEDURE — 36415 COLL VENOUS BLD VENIPUNCTURE: CPT

## 2023-09-13 PROCEDURE — 88309 TISSUE EXAM BY PATHOLOGIST: CPT

## 2023-09-13 PROCEDURE — 84132 ASSAY OF SERUM POTASSIUM: CPT

## 2023-09-13 PROCEDURE — 85018 HEMOGLOBIN: CPT

## 2023-09-13 PROCEDURE — 82570 ASSAY OF URINE CREATININE: CPT

## 2023-09-13 PROCEDURE — 88305 TISSUE EXAM BY PATHOLOGIST: CPT

## 2023-09-13 PROCEDURE — 80048 BASIC METABOLIC PNL TOTAL CA: CPT

## 2023-09-13 PROCEDURE — 83735 ASSAY OF MAGNESIUM: CPT

## 2023-09-13 PROCEDURE — 82962 GLUCOSE BLOOD TEST: CPT

## 2023-09-13 PROCEDURE — C1889: CPT

## 2023-09-13 PROCEDURE — 86850 RBC ANTIBODY SCREEN: CPT

## 2023-09-13 PROCEDURE — 82947 ASSAY GLUCOSE BLOOD QUANT: CPT

## 2023-09-13 PROCEDURE — 86901 BLOOD TYPING SEROLOGIC RH(D): CPT

## 2023-09-13 PROCEDURE — S2900: CPT

## 2023-09-13 PROCEDURE — 84100 ASSAY OF PHOSPHORUS: CPT

## 2023-09-13 PROCEDURE — 94660 CPAP INITIATION&MGMT: CPT

## 2023-09-13 PROCEDURE — 86900 BLOOD TYPING SEROLOGIC ABO: CPT

## 2023-09-13 RX ORDER — ROSUVASTATIN CALCIUM 5 MG/1
1 TABLET ORAL
Qty: 0 | Refills: 0 | DISCHARGE

## 2023-09-13 RX ORDER — DOCUSATE SODIUM 100 MG
1 CAPSULE ORAL
Qty: 28 | Refills: 0
Start: 2023-09-13 | End: 2023-09-26

## 2023-09-13 RX ORDER — OXYCODONE HYDROCHLORIDE 5 MG/1
1 TABLET ORAL
Qty: 8 | Refills: 0
Start: 2023-09-13 | End: 2023-09-16

## 2023-09-13 RX ORDER — APIXABAN 2.5 MG/1
1 TABLET, FILM COATED ORAL
Qty: 56 | Refills: 0
Start: 2023-09-13 | End: 2023-10-10

## 2023-09-13 RX ADMIN — Medication 1000 MILLIGRAM(S): at 05:51

## 2023-09-13 RX ADMIN — FAMOTIDINE 20 MILLIGRAM(S): 10 INJECTION INTRAVENOUS at 05:51

## 2023-09-13 RX ADMIN — SODIUM CHLORIDE 3 MILLILITER(S): 9 INJECTION INTRAMUSCULAR; INTRAVENOUS; SUBCUTANEOUS at 05:59

## 2023-09-13 RX ADMIN — Medication 1000 MILLIGRAM(S): at 00:00

## 2023-09-13 RX ADMIN — Medication 1000 MILLIGRAM(S): at 11:21

## 2023-09-13 RX ADMIN — SIMETHICONE 80 MILLIGRAM(S): 80 TABLET, CHEWABLE ORAL at 07:25

## 2023-09-13 RX ADMIN — HEPARIN SODIUM 5000 UNIT(S): 5000 INJECTION INTRAVENOUS; SUBCUTANEOUS at 05:51

## 2023-09-13 RX ADMIN — Medication 1 MILLIGRAM(S): at 11:22

## 2023-09-13 RX ADMIN — DULOXETINE HYDROCHLORIDE 20 MILLIGRAM(S): 30 CAPSULE, DELAYED RELEASE ORAL at 11:21

## 2023-09-13 NOTE — PROGRESS NOTE ADULT - ASSESSMENT
67 yo female s/p robotic assisted radical cystectomy , open ileal conduit 9/5
69 yo female s/p robotic assisted radical cystectomy , open ileal conduit 9/5    
69 yo female s/p robotic assisted radical cystectomy , open ileal conduit 9/5
ASSESSMENT: 68yFemale w/ bladder cancer s/p cystectomy.  Patient VSS, HDS, and afebrile.     PLAN:  Diet: FLD  Pain control  Monitor Urine Output  DVT ppx: SCD  OOB/IS
67 yo female s/p robotic assisted radical cystectomy , open ileal conduit 9/5    
67 yo female s/p robotic assisted radical cystectomy , open ileal conduit 9/5    
69 yo female s/p robotic assisted radical cystectomy , open ileal conduit 9/5
68 year old female with history of bladder cancer s/p cystectomy 09/05
ASSESSMENT: 68yFemale w/ bladder cancer s/p cystectomy, ileal conduit. Patient VSS, HDS, and afebrile.     PLAN:  Diet: Clears  Pain control  Monitor Urine Output  DVT ppx: SCD  OOB/IS

## 2023-09-13 NOTE — DISCHARGE NOTE PROVIDER - CARE PROVIDER_API CALL
Harsha Keller  Urology  [address]  [place] [address]  Phone: [phone]  Fax: [phone]  Follow Up Time:

## 2023-09-13 NOTE — ADVANCED PRACTICE NURSE CONSULT - RECOMMEDATIONS
The patient and spouse require additional instructions. 
The patient/spouse would benefit from home care services to reinforce ostomy teaching.
The patient and spouse would benefit from home care to reinforce ostomy teaching. 
WOCN will continue to follow for additional ostomy instructions.

## 2023-09-13 NOTE — DISCHARGE NOTE PROVIDER - NSDCFUADDINST_GEN_ALL_CORE_FT
Follow up with Dr Nagy as advised. Continue ostomy care as instructed by ostomy nursing. Resume home medication regimen and complete Eliquis 2.5mg BID course for 28 days for DVT prophylaxis as prescribed.    General Discharge Instructions:  Use Tylenol for pain control as needed  Please resume all regular home medications unless specifically advised not to take a particular medication. Also, please take any new medications as prescribed.  Please get plenty of rest, continue to ambulate several times per day, and drink adequate amounts of fluids.     Warning Signs:  Please call your doctor if you experience the following:  *You experience new chest pain, pressure, squeezing or tightness.  *New or worsening cough, shortness of breath, or wheeze.  *If you are vomiting and cannot keep down fluids or your medications.  *You are getting dehydrated due to continued vomiting, diarrhea, or other reasons. Signs of dehydration include dry mouth, rapid heartbeat, or feeling dizzy or faint when standing.  *You see blood or dark/black material when you vomit or have a bowel movement.  *You experience burning when you urinate, have blood in your urine, or experience a discharge.  *Your pain is not improving within 8-12 hours or is not gone within 24 hours. Call or return immediately if your pain is getting worse, changes location, or moves to your chest or back.  *You have shaking chills, or fever greater than 100.4 degrees Fahrenheit.  *Any change in your symptoms, or any new symptoms that concern you.   Follow up with Dr Nagy as advised. Continue ostomy care as instructed by ostomy nursing. Resume home medication regimen and complete Eliquis 2.5mg BID course for 28 days for DVT prophylaxis as prescribed.    -Use Miralax PRN for constipation    General Discharge Instructions:  Use Tylenol for pain control as needed  Please resume all regular home medications unless specifically advised not to take a particular medication. Also, please take any new medications as prescribed.  Please get plenty of rest, continue to ambulate several times per day, and drink adequate amounts of fluids.     Warning Signs:  Please call your doctor if you experience the following:  *You experience new chest pain, pressure, squeezing or tightness.  *New or worsening cough, shortness of breath, or wheeze.  *If you are vomiting and cannot keep down fluids or your medications.  *You are getting dehydrated due to continued vomiting, diarrhea, or other reasons. Signs of dehydration include dry mouth, rapid heartbeat, or feeling dizzy or faint when standing.  *You see blood or dark/black material when you vomit or have a bowel movement.  *You experience burning when you urinate, have blood in your urine, or experience a discharge.  *Your pain is not improving within 8-12 hours or is not gone within 24 hours. Call or return immediately if your pain is getting worse, changes location, or moves to your chest or back.  *You have shaking chills, or fever greater than 100.4 degrees Fahrenheit.  *Any change in your symptoms, or any new symptoms that concern you.

## 2023-09-13 NOTE — ADVANCED PRACTICE NURSE CONSULT - ASSESSMENT
Ileal conduit pouching system intact draining to bedside drainage. WOCN provided the patient with written instruction for use of leg bag and bedside drainage bag.

## 2023-09-13 NOTE — DISCHARGE NOTE PROVIDER - NSDCCPTREATMENT_GEN_ALL_CORE_FT
PRINCIPAL PROCEDURE  Procedure: Robot-assisted laparoscopic cystectomy  Findings and Treatment:       SECONDARY PROCEDURE  Procedure: Ureteroileal conduit  Findings and Treatment:

## 2023-09-13 NOTE — PROGRESS NOTE ADULT - PROBLEM SELECTOR PLAN 1
-stable  -OOB  -SCD's, IS  -f/u labs  -Ostomy teaching  -monitor temps  -continue present care
-OOB  -SCD's, IS  -f/u labs  -d/c planning  -ureteral stents removed 9/12  -continue present care  -ostomy nurse f/u noted.
-stable  -OOB  -SCD's, IS  -f/u labs  -monitor temps  -continue present care
-stable  -OOB  -SCD's, IS  -f/u labs  -Ostomy teaching  -monitor temps  -continue present care
-stable  -OOB  -SCD's, IS  -f/u labs  -continue present care  -f/u GHAZAL Cr level
-stable  -pain control  -monitor I&Os  -DVT prophylaxis  -NPO tonight, CLD tomorrow   -am labs  -home meds  -abx prophylaxis
-stable  -OOB  -SCD's, IS  -f/u labs  -d/c planning  -to remove ureteral stents   -continue present care  -ostomy nurse f/u noted

## 2023-09-13 NOTE — PROGRESS NOTE ADULT - REASON FOR ADMISSION
RA cystectomy

## 2023-09-13 NOTE — DISCHARGE NOTE PROVIDER - HOSPITAL COURSE
68 year old female with history of bladder cancer s/p robotic assisted radical cystectomy 09/05. Surgical course without complications. Hospitalization course without remarkable events. Patient had her GHAZAL drain removed 09/09. Ureteral stents removed 09/12. Patient has been working with ostomy nurse for ostomy teaching.

## 2023-09-13 NOTE — DISCHARGE NOTE PROVIDER - NSDCMRMEDTOKEN_GEN_ALL_CORE_FT
adapt barrier rings 2 inches # 8805: 3 months refill  adapt ostomy belt (large) # 7299: 3 months refill  adapt stoma powder #7906: 3 months refill  amLODIPine 5 mg oral tablet: 1 tab(s) orally once a day  bard leg bag # [id number]: 3 months refill  bedside drainage bag # [id number]: 3 months refill  Crestor 20 mg oral tablet: 1 orally once a day  Cymbalta 20 mg oral delayed release capsule: orally once a day  liss drainable pouches Urostomy 1 and 3/4 inches # 48828: 3 months refill  liss skin barriers (convex) 1 and 3/4 inches # 44442: 3 months refill  KlonoPIN 1 mg oral tablet: 1 orally once a day  non-sterile gloves large: 3 months refill  PDI adhesive remover pad #[id number]: 3 month refill  skin prep # 7917: 3 months refill  Vitamin D3 25 mcg (1000 intl units) oral tablet: 1 tab(s) orally once a day   adapt barrier rings 2 inches # 8805: 3 months refill  adapt ostomy belt (large) # 7299: 3 months refill  adapt stoma powder #7906: 3 months refill  amLODIPine 5 mg oral tablet: 1 tab(s) orally once a day  bard leg bag # [id number]: 3 months refill  bedside drainage bag # [id number]: 3 months refill  Crestor 20 mg oral tablet: 1 orally once a day  Cymbalta 20 mg oral delayed release capsule: orally once a day  Eliquis 2.5 mg oral tablet: 1 tab(s) orally 2 times a day  liss drainable pouches Urostomy 1 and 3/4 inches # 91422: 3 months refill  liss skin barriers (convex) 1 and 3/4 inches # 80841: 3 months refill  KlonoPIN 1 mg oral tablet: 1 orally once a day  non-sterile gloves large: 3 months refill  PDI adhesive remover pad #[id number]: 3 month refill  skin prep # 7917: 3 months refill  Vitamin D3 25 mcg (1000 intl units) oral tablet: 1 tab(s) orally once a day   adapt barrier rings 2 inches # 8805: 3 months refill  adapt ostomy belt (large) # 7299: 3 months refill  adapt stoma powder #7906: 3 months refill  amLODIPine 5 mg oral tablet: 1 tab(s) orally once a day  bard leg bag # [id number]: 3 months refill  bedside drainage bag # [id number]: 3 months refill  Colace 100 mg oral capsule: 1 cap(s) orally 2 times a day  Crestor 20 mg oral tablet: 1 orally once a day  Cymbalta 20 mg oral delayed release capsule: orally once a day  Eliquis 2.5 mg oral tablet: 1 tab(s) orally 2 times a day  liss drainable pouches Urostomy 1 and 3/4 inches # 54577: 3 months refill  liss skin barriers (convex) 1 and 3/4 inches # 56174: 3 months refill  KlonoPIN 1 mg oral tablet: 1 orally once a day  non-sterile gloves large: 3 months refill  oxyCODONE 5 mg oral tablet: 1 tab(s) orally 2 times a day as needed for  severe pain Use only for severe pain not alleviated by OTC medications MDD: 2  PDI adhesive remover pad #[id number]: 3 month refill  skin prep # 7917: 3 months refill  Vitamin D3 25 mcg (1000 intl units) oral tablet: 1 tab(s) orally once a day

## 2023-09-13 NOTE — PROGRESS NOTE ADULT - SUBJECTIVE AND OBJECTIVE BOX
INTERVAL HPI/OVERNIGHT EVENTS:  No acute events overnight.    VITALS:    T(F): 97.9 (09-11-23 @ 05:01), Max: 98.7 (09-10-23 @ 10:00)  HR: 98 (09-11-23 @ 03:55) (92 - 112)  BP: 141/77 (09-11-23 @ 03:55) (126/75 - 141/77)  RR: 18 (09-11-23 @ 03:55) (17 - 22)  SpO2: 97% (09-11-23 @ 03:55) (93% - 98%)  Wt(kg): --    I&O's Detail    09 Sep 2023 07:01  -  10 Sep 2023 07:00  --------------------------------------------------------  IN:    Oral Fluid: 530 mL  Total IN: 530 mL    OUT:    Bulb (mL): 0 mL    Urostomy (mL): 1825 mL    Voided (mL): 0 mL  Total OUT: 1825 mL    Total NET: -1295 mL      10 Sep 2023 07:01  -  11 Sep 2023 05:43  --------------------------------------------------------  IN:    Oral Fluid: 420 mL  Total IN: 420 mL    OUT:    Urostomy (mL): 1425 mL  Total OUT: 1425 mL    Total NET: -1005 mL          MEDICATIONS:    ANTIBIOTICS:  cefTRIAXone   IVPB 1000 milliGRAM(s) IV Intermittent every 24 hours      PAIN CONTROL:  acetaminophen     Tablet .. 1000 milliGRAM(s) Oral every 6 hours  clonazePAM  Tablet 1 milliGRAM(s) Oral daily  DULoxetine 20 milliGRAM(s) Oral daily  ondansetron Injectable 4 milliGRAM(s) IV Push every 6 hours PRN  oxyCODONE    IR 10 milliGRAM(s) Oral every 6 hours PRN  oxyCODONE    IR 5 milliGRAM(s) Oral every 4 hours PRN       MEDS:      HEME/ONC  heparin   Injectable 5000 Unit(s) SubCutaneous every 8 hours        PHYSICAL EXAM:  General: No acute distress.  Alert and Oriented x 3  Abdominal Exam: appropriately soft, non tender. Incision site c/d/i.     Exam: Urostomy in tact draining well w/ yellow/clear output.       LABS:                        11.8   12.59 )-----------( 262      ( 10 Sep 2023 06:26 )             36.2     09-10    129<L>  |  94<L>  |  13  ----------------------------<  124<H>  4.0   |  23  |  0.72    Ca    9.3      10 Sep 2023 06:26  Phos  4.9     09-10  Mg     2.1     09-10        Urinalysis Basic - ( 10 Sep 2023 06:26 )    Color: x / Appearance: x / SG: x / pH: x  Gluc: 124 mg/dL / Ketone: x  / Bili: x / Urobili: x   Blood: x / Protein: x / Nitrite: x   Leuk Esterase: x / RBC: x / WBC x   Sq Epi: x / Non Sq Epi: x / Bacteria: x        RADIOLOGY & ADDITIONAL TESTS:      
INTERVAL HPI/OVERNIGHT EVENTS:  No acute events overnight.    VITALS:    T(F): 97.6 (09-06-23 @ 04:14), Max: 99.2 (09-05-23 @ 15:19)  HR: 55 (09-06-23 @ 04:15) (55 - 75)  BP: 133/75 (09-06-23 @ 03:14) (42/59 - 152/83)  RR: 16 (09-06-23 @ 04:15) (15 - 23)  SpO2: 97% (09-06-23 @ 04:15) (93% - 100%)  Wt(kg): --    I&O's Detail    05 Sep 2023 07:01  -  06 Sep 2023 06:53  --------------------------------------------------------  IN:    Oral Fluid: 30 mL    sodium chloride 0.9%: 1430 mL  Total IN: 1460 mL    OUT:    Bulb (mL): 90 mL    Urostomy (mL): 650 mL  Total OUT: 740 mL    Total NET: 720 mL          MEDICATIONS:    ANTIBIOTICS:      PAIN CONTROL:  acetaminophen     Tablet .. 1000 milliGRAM(s) Oral every 6 hours  clonazePAM  Tablet 1 milliGRAM(s) Oral daily  DULoxetine 20 milliGRAM(s) Oral daily  ketorolac   Injectable 15 milliGRAM(s) IV Push every 6 hours PRN  ondansetron Injectable 4 milliGRAM(s) IV Push every 6 hours PRN  oxyCODONE    IR 5 milliGRAM(s) Oral every 6 hours PRN       MEDS:      HEME/ONC  heparin   Injectable 5000 Unit(s) SubCutaneous every 8 hours        PHYSICAL EXAM:  General: No acute distress.  Alert and Oriented x 3  Abdominal Exam: appropriately soft, appropriately tender. Incision site c/d/i.  Urostomy well appearing, stents noted draining well, w/ coca cola colored output.   Exam: Negative SP tenderness.       LABS:                        11.3   10.98 )-----------( 202      ( 06 Sep 2023 05:45 )             34.7     09-06    140  |  108  |  11  ----------------------------<  137<H>  4.0   |  24  |  0.63    Ca    8.6      06 Sep 2023 05:45        Urinalysis Basic - ( 06 Sep 2023 05:45 )    Color: x / Appearance: x / SG: x / pH: x  Gluc: 137 mg/dL / Ketone: x  / Bili: x / Urobili: x   Blood: x / Protein: x / Nitrite: x   Leuk Esterase: x / RBC: x / WBC x   Sq Epi: x / Non Sq Epi: x / Bacteria: x        RADIOLOGY & ADDITIONAL TESTS:      
AMIRA GARCIA  [id number]  09-10-23 @ 06:38      UROLOGY SERVICE: DAILY PROGRESS NOTE    Chief admitting complaint: Bladder cancer    No acute events overnight.  Mild nausea. Pain moderately controlled.      LABS:   09 Sep 2023 06:19    132    |  98     |  8      ----------------------------<  108    3.6     |  26     |  0.61     Ca    8.8        09 Sep 2023 06:19  Phos  3.3       09 Sep 2023 06:19  Mg     1.9       09 Sep 2023 06:19                            11.8   12.59 )-----------( 262      ( 10 Sep 2023 06:26 )             36.2       I/O:  I&O's Summary    08 Sep 2023 07:01  -  09 Sep 2023 07:00  --------------------------------------------------------  IN: 50 mL / OUT: 1725 mL / NET: -1675 mL    09 Sep 2023 07:01  -  10 Sep 2023 06:38  --------------------------------------------------------  IN: 480 mL / OUT: 1475 mL / NET: -995 mL        VITALS:  Vital Signs Last 24 Hrs  T(C): 36.9 (09-10-23 @ 04:38), Max: 37.1 (09-09-23 @ 21:52)  T(F): 98.5 (09-10-23 @ 04:38), Max: 98.7 (09-09-23 @ 21:52)  HR: 110 (09-10-23 @ 06:15) (88 - 110)  BP: 126/71 (09-10-23 @ 03:49) (115/68 - 141/77)  BP(mean): 93 (09-10-23 @ 03:49) (82 - 100)  RR: 17 (09-10-23 @ 06:15) (16 - 22)  SpO2: 93% (09-10-23 @ 06:15) (92% - 98%)      PHYSICAL EXAM:    General: No acute distress.  Alert and Oriented  Abdominal Exam:soft, incision site clean and dry, urostomy- urine blood tinged, GHAZAL drain serosanguinous  :  EXT: b/l LE with SCDS on  SKIN: No rashes, lesions, ulcerations
INTERVAL HPI/OVERNIGHT EVENTS:  No acute events overnight.    VITALS:    T(F): 100.4 (09-08-23 @ 05:10), Max: 100.4 (09-08-23 @ 05:10)  HR: 86 (09-08-23 @ 04:00) (72 - 92)  BP: 116/56 (09-08-23 @ 04:00) (110/62 - 135/66)  RR: 19 (09-08-23 @ 04:00) (14 - 23)  SpO2: 97% (09-08-23 @ 04:00) (93% - 98%)  Wt(kg): --    I&O's Detail    06 Sep 2023 07:01  -  07 Sep 2023 07:00  --------------------------------------------------------  IN:    sodium chloride 0.9%: 1360 mL  Total IN: 1360 mL    OUT:    Bulb (mL): 470 mL    Urostomy (mL): 2550 mL  Total OUT: 3020 mL    Total NET: -1660 mL      07 Sep 2023 07:01  -  08 Sep 2023 05:55  --------------------------------------------------------  IN:    Oral Fluid: 1000 mL  Total IN: 1000 mL    OUT:    Bulb (mL): 190 mL    Urostomy (mL): 1215 mL  Total OUT: 1405 mL    Total NET: -405 mL          MEDICATIONS:    ANTIBIOTICS:      PAIN CONTROL:  acetaminophen     Tablet .. 1000 milliGRAM(s) Oral every 6 hours  clonazePAM  Tablet 1 milliGRAM(s) Oral daily  DULoxetine 20 milliGRAM(s) Oral daily  ondansetron Injectable 4 milliGRAM(s) IV Push every 6 hours PRN  oxyCODONE    IR 5 milliGRAM(s) Oral every 6 hours PRN       MEDS:      HEME/ONC  heparin   Injectable 5000 Unit(s) SubCutaneous every 8 hours        PHYSICAL EXAM:  General: No acute distress.  Alert and Oriented  Abdominal Exam:soft, incision site clean and dry, urostomy- urine blood tinged, GHAZAL drain serosanguinous     Exam: as above      LABS:                        10.8   11.87 )-----------( 197      ( 07 Sep 2023 06:48 )             34.4     09-07    141  |  108  |  9   ----------------------------<  121<H>  3.5   |  25  |  0.52    Ca    8.6      07 Sep 2023 06:48  Phos  2.2     09-07  Mg     2.0     09-07        Urinalysis Basic - ( 07 Sep 2023 06:48 )    Color: x / Appearance: x / SG: x / pH: x  Gluc: 121 mg/dL / Ketone: x  / Bili: x / Urobili: x   Blood: x / Protein: x / Nitrite: x   Leuk Esterase: x / RBC: x / WBC x   Sq Epi: x / Non Sq Epi: x / Bacteria: x        RADIOLOGY & ADDITIONAL TESTS:     
INTERVAL HPI/OVERNIGHT EVENTS:  No acute events overnight.    VITALS:    T(F): 98.6 (09-12-23 @ 04:40), Max: 98.6 (09-12-23 @ 04:40)  HR: 82 (09-12-23 @ 04:15) (76 - 105)  BP: 154/78 (09-12-23 @ 04:15) (116/76 - 157/79)  RR: 18 (09-12-23 @ 04:15) (18 - 21)  SpO2: 98% (09-12-23 @ 04:15) (95% - 100%)  Wt(kg): --    I&O's Detail    10 Sep 2023 07:01  -  11 Sep 2023 07:00  --------------------------------------------------------  IN:    Oral Fluid: 420 mL  Total IN: 420 mL    OUT:    Urostomy (mL): 1975 mL  Total OUT: 1975 mL    Total NET: -1555 mL      11 Sep 2023 07:01  -  12 Sep 2023 05:35  --------------------------------------------------------  IN:  Total IN: 0 mL    OUT:    Urostomy (mL): 2175 mL  Total OUT: 2175 mL    Total NET: -2175 mL          MEDICATIONS:    ANTIBIOTICS:  cefTRIAXone   IVPB 1000 milliGRAM(s) IV Intermittent every 24 hours      PAIN CONTROL:  acetaminophen     Tablet .. 1000 milliGRAM(s) Oral every 6 hours  clonazePAM  Tablet 1 milliGRAM(s) Oral daily  DULoxetine 20 milliGRAM(s) Oral daily  ondansetron Injectable 4 milliGRAM(s) IV Push every 6 hours PRN  oxyCODONE    IR 5 milliGRAM(s) Oral every 4 hours PRN  oxyCODONE    IR 10 milliGRAM(s) Oral every 6 hours PRN       MEDS:      HEME/ONC  heparin   Injectable 5000 Unit(s) SubCutaneous every 8 hours        PHYSICAL EXAM:  General: No acute distress.  Alert and Oriented  Abdominal Exam: soft, incision site clean and dry, mild distention,  ileostomy intact, urine clear, ureteral stents intact   Exam: as above      LABS:                        10.8   10.78 )-----------( 302      ( 11 Sep 2023 05:47 )             32.3     09-11    129<L>  |  94<L>  |  13  ----------------------------<  127<H>  3.8   |  26  |  0.63    Ca    9.2      11 Sep 2023 05:47  Phos  4.0     09-11  Mg     2.1     09-11        Urinalysis Basic - ( 11 Sep 2023 05:47 )    Color: x / Appearance: x / SG: x / pH: x  Gluc: 127 mg/dL / Ketone: x  / Bili: x / Urobili: x   Blood: x / Protein: x / Nitrite: x   Leuk Esterase: x / RBC: x / WBC x   Sq Epi: x / Non Sq Epi: x / Bacteria: x        RADIOLOGY & ADDITIONAL TESTS:     
INTERVAL HPI/OVERNIGHT EVENTS:  No acute events overnight.    VITALS:    T(F): 99.2 (09-07-23 @ 05:15), Max: 99.2 (09-07-23 @ 05:15)  HR: 64 (09-07-23 @ 05:32) (60 - 76)  BP: 132/66 (09-07-23 @ 03:25) (132/66 - 182/72)  RR: 17 (09-07-23 @ 03:25) (16 - 17)  SpO2: 97% (09-07-23 @ 05:32) (95% - 99%)  Wt(kg): --    I&O's Detail    05 Sep 2023 07:01  -  06 Sep 2023 07:00  --------------------------------------------------------  IN:    Oral Fluid: 30 mL    sodium chloride 0.9%: 1430 mL  Total IN: 1460 mL    OUT:    Bulb (mL): 90 mL    Urostomy (mL): 650 mL  Total OUT: 740 mL    Total NET: 720 mL      06 Sep 2023 07:01  -  07 Sep 2023 05:44  --------------------------------------------------------  IN:    sodium chloride 0.9%: 1360 mL  Total IN: 1360 mL    OUT:    Bulb (mL): 470 mL    Urostomy (mL): 2550 mL  Total OUT: 3020 mL    Total NET: -1660 mL          MEDICATIONS:    ANTIBIOTICS:      PAIN CONTROL:  acetaminophen     Tablet .. 1000 milliGRAM(s) Oral every 6 hours  clonazePAM  Tablet 1 milliGRAM(s) Oral daily  DULoxetine 20 milliGRAM(s) Oral daily  ketorolac   Injectable 15 milliGRAM(s) IV Push every 6 hours PRN  ondansetron Injectable 4 milliGRAM(s) IV Push every 6 hours PRN  oxyCODONE    IR 5 milliGRAM(s) Oral every 6 hours PRN       MEDS:      HEME/ONC  heparin   Injectable 5000 Unit(s) SubCutaneous every 8 hours        PHYSICAL EXAM:  General: No acute distress.  Alert and Oriented  Abdominal Exam: soft, incision site clean and dry, urostomy- urine blood tinged, GHAZAL drain serosanguinous   Exam: as above      LABS:                        11.3   10.98 )-----------( 202      ( 06 Sep 2023 05:45 )             34.7     09-06    140  |  108  |  11  ----------------------------<  137<H>  4.0   |  24  |  0.63    Ca    8.6      06 Sep 2023 05:45        Urinalysis Basic - ( 06 Sep 2023 05:45 )    Color: x / Appearance: x / SG: x / pH: x  Gluc: 137 mg/dL / Ketone: x  / Bili: x / Urobili: x   Blood: x / Protein: x / Nitrite: x   Leuk Esterase: x / RBC: x / WBC x   Sq Epi: x / Non Sq Epi: x / Bacteria: x        RADIOLOGY & ADDITIONAL TESTS:       
 Post OP/Procedure note: AMIRA [id number]    Patient reports to minimal abdominal discomfort appropriate to current state. He denies any nausea, vomiting, chest pain, sob, dizziness, weakness. Denies suprapubic discomfort.     PE  GEN: NAD  ABD: incisions dry and clean, urostomy with stents and hematuria, GHAZAL drain with sanguinous fluid, mildly distended appropriately, mildly tender to palpation  : As above    T(C): 37.3 (09-05-23 @ 15:19), Max: 37.3 (09-05-23 @ 15:19)  HR: 61 (09-05-23 @ 16:34) (56 - 61)  BP: 113/59 (09-05-23 @ 16:34) (42/59 - 152/83)  RR: 17 (09-05-23 @ 16:34) (15 - 18)  SpO2: 95% (09-05-23 @ 16:34) (93% - 100%)    09-05-23 @ 07:01  -  09-05-23 @ 17:45  --------------------------------------------------------  IN: 250 mL / OUT: 0 mL / NET: 250 mL      
AMIRA GARCIA  [id number]  09-09-23 @ 06:38      UROLOGY SERVICE: DAILY PROGRESS NOTE    Chief admitting complaint: Bladder cancer    No acute events overnight.  No N/V/D/F.  Pain moderately controlled.      LABS:   08 Sep 2023 05:47    138    |  105    |  11     ----------------------------<  128    3.5     |  24     |  0.60     Ca    8.8        08 Sep 2023 05:47  Phos  3.2       08 Sep 2023 05:47  Mg     1.8       08 Sep 2023 05:47                            10.5   12.03 )-----------( 219      ( 09 Sep 2023 06:19 )             32.3       I/O:  I&O's Summary    07 Sep 2023 07:01  -  08 Sep 2023 07:00  --------------------------------------------------------  IN: 1300 mL / OUT: 1485 mL / NET: -185 mL    08 Sep 2023 07:01  -  09 Sep 2023 06:38  --------------------------------------------------------  IN: 50 mL / OUT: 1725 mL / NET: -1675 mL        VITALS:  Vital Signs Last 24 Hrs  T(C): 36.9 (09-09-23 @ 05:08), Max: 36.9 (09-08-23 @ 17:21)  T(F): 98.4 (09-09-23 @ 05:08), Max: 98.5 (09-08-23 @ 17:21)  HR: 78 (09-09-23 @ 04:10) (78 - 108)  BP: 121/74 (09-09-23 @ 04:10) (121/74 - 133/60)  BP(mean): 90 (09-09-23 @ 04:10) (85 - 90)  RR: 18 (09-09-23 @ 04:10) (16 - 23)  SpO2: 97% (09-09-23 @ 04:10) (91% - 99%)      PHYSICAL EXAM:    General: No acute distress.  Alert and Oriented  Abdominal Exam:soft, incision site clean and dry, urostomy- urine blood tinged, GHAZAL drain serosanguinous  :  EXT: b/l LE with SCDS on  SKIN: No rashes, lesions, ulcerations
INTERVAL HPI/OVERNIGHT EVENTS:  No acute events overnight.    VITALS:    T(F): 98 (09-12-23 @ 21:28), Max: 98.6 (09-12-23 @ 04:40)  HR: 78 (09-13-23 @ 03:55) (76 - 92)  BP: 120/69 (09-13-23 @ 03:55) (117/73 - 135/83)  RR: 20 (09-13-23 @ 03:55) (17 - 24)  SpO2: 99% (09-13-23 @ 03:55) (96% - 99%)  Wt(kg): --    I&O's Detail    11 Sep 2023 07:01  -  12 Sep 2023 07:00  --------------------------------------------------------  IN:  Total IN: 0 mL    OUT:    Urostomy (mL): 2175 mL  Total OUT: 2175 mL    Total NET: -2175 mL      12 Sep 2023 07:01  -  13 Sep 2023 04:23  --------------------------------------------------------  IN:    Oral Fluid: 120 mL  Total IN: 120 mL    OUT:    Urostomy (mL): 1550 mL  Total OUT: 1550 mL    Total NET: -1430 mL          MEDICATIONS:    ANTIBIOTICS:  cefTRIAXone   IVPB 1000 milliGRAM(s) IV Intermittent every 24 hours      PAIN CONTROL:  acetaminophen     Tablet .. 1000 milliGRAM(s) Oral every 6 hours  clonazePAM  Tablet 1 milliGRAM(s) Oral daily  DULoxetine 20 milliGRAM(s) Oral daily  ondansetron Injectable 4 milliGRAM(s) IV Push every 6 hours PRN  oxyCODONE    IR 5 milliGRAM(s) Oral every 6 hours PRN       MEDS:      HEME/ONC  heparin   Injectable 5000 Unit(s) SubCutaneous every 8 hours        PHYSICAL EXAM:  General: No acute distress.  Alert and Oriented  Abdominal Exam: soft, non-tender, non-distended. ostomy pink, urine yellow    Exam: no CVAT      LABS:                        10.5   10.29 )-----------( 339      ( 12 Sep 2023 05:30 )             32.0     09-12    134<L>  |  97  |  10  ----------------------------<  123<H>  4.0   |  28  |  0.56    Ca    9.2      12 Sep 2023 05:30  Phos  3.9     09-12  Mg     2.0     09-12        Urinalysis Basic - ( 12 Sep 2023 05:30 )    Color: x / Appearance: x / SG: x / pH: x  Gluc: 123 mg/dL / Ketone: x  / Bili: x / Urobili: x   Blood: x / Protein: x / Nitrite: x   Leuk Esterase: x / RBC: x / WBC x   Sq Epi: x / Non Sq Epi: x / Bacteria: x

## 2023-09-13 NOTE — DISCHARGE NOTE PROVIDER - NSDCCPCAREPLAN_GEN_ALL_CORE_FT
PRINCIPAL DISCHARGE DIAGNOSIS  Diagnosis: Bladder cancer  Assessment and Plan of Treatment: Follow up with Dr Nagy as advised. Continue ostomy care as instructed by ostomy nursing. Resume home medication regimen and complete Eliquis 2.5mg BID course for 28 days for DVT prophylaxis as prescribed.     PRINCIPAL DISCHARGE DIAGNOSIS  Diagnosis: Bladder cancer  Assessment and Plan of Treatment: Follow up with Dr Nagy as advised. Continue ostomy care as instructed by ostomy nursing. Resume home medication regimen and complete Eliquis 2.5mg BID course for 28 days for DVT prophylaxis as prescribed.  Use Oxycodone 5mg prescribed only for severe pain not alleviated by OTC medications. NEVER combine oxycodone with klonopin or other narcotic prescription pain medications.

## 2023-09-17 ENCOUNTER — NON-APPOINTMENT (OUTPATIENT)
Age: 68
End: 2023-09-17

## 2023-09-18 ENCOUNTER — NON-APPOINTMENT (OUTPATIENT)
Age: 68
End: 2023-09-18

## 2023-09-19 DIAGNOSIS — E78.00 PURE HYPERCHOLESTEROLEMIA, UNSPECIFIED: ICD-10-CM

## 2023-09-19 DIAGNOSIS — F41.0 PANIC DISORDER [EPISODIC PAROXYSMAL ANXIETY]: ICD-10-CM

## 2023-09-19 DIAGNOSIS — I10 ESSENTIAL (PRIMARY) HYPERTENSION: ICD-10-CM

## 2023-09-19 DIAGNOSIS — C67.8 MALIGNANT NEOPLASM OF OVERLAPPING SITES OF BLADDER: ICD-10-CM

## 2023-09-19 DIAGNOSIS — G47.30 SLEEP APNEA, UNSPECIFIED: ICD-10-CM

## 2023-09-19 DIAGNOSIS — C79.11 SECONDARY MALIGNANT NEOPLASM OF BLADDER: ICD-10-CM

## 2023-09-19 DIAGNOSIS — Z92.21 PERSONAL HISTORY OF ANTINEOPLASTIC CHEMOTHERAPY: ICD-10-CM

## 2023-09-20 ENCOUNTER — APPOINTMENT (OUTPATIENT)
Dept: UROLOGY | Facility: CLINIC | Age: 68
End: 2023-09-20

## 2023-09-27 ENCOUNTER — APPOINTMENT (OUTPATIENT)
Dept: UROLOGY | Facility: CLINIC | Age: 68
End: 2023-09-27
Payer: MEDICARE

## 2023-09-27 VITALS — SYSTOLIC BLOOD PRESSURE: 131 MMHG | DIASTOLIC BLOOD PRESSURE: 72 MMHG | HEART RATE: 121 BPM | TEMPERATURE: 97.9 F

## 2023-09-27 PROCEDURE — 99024 POSTOP FOLLOW-UP VISIT: CPT

## 2023-10-09 ENCOUNTER — NON-APPOINTMENT (OUTPATIENT)
Age: 68
End: 2023-10-09

## 2023-10-20 ENCOUNTER — LABORATORY RESULT (OUTPATIENT)
Age: 68
End: 2023-10-20

## 2023-10-20 ENCOUNTER — APPOINTMENT (OUTPATIENT)
Dept: HEMATOLOGY ONCOLOGY | Facility: CLINIC | Age: 68
End: 2023-10-20
Payer: MEDICARE

## 2023-10-20 VITALS
OXYGEN SATURATION: 96 % | HEIGHT: 63 IN | SYSTOLIC BLOOD PRESSURE: 156 MMHG | BODY MASS INDEX: 26.58 KG/M2 | HEART RATE: 60 BPM | DIASTOLIC BLOOD PRESSURE: 88 MMHG | WEIGHT: 150 LBS | RESPIRATION RATE: 18 BRPM | TEMPERATURE: 98.1 F

## 2023-10-20 DIAGNOSIS — Z87.898 PERSONAL HISTORY OF OTHER SPECIFIED CONDITIONS: ICD-10-CM

## 2023-10-20 DIAGNOSIS — R33.8 OTHER RETENTION OF URINE: ICD-10-CM

## 2023-10-20 DIAGNOSIS — N32.89 OTHER SPECIFIED DISORDERS OF BLADDER: ICD-10-CM

## 2023-10-20 PROCEDURE — 36415 COLL VENOUS BLD VENIPUNCTURE: CPT

## 2023-10-20 PROCEDURE — 99215 OFFICE O/P EST HI 40 MIN: CPT | Mod: 25

## 2023-10-23 ENCOUNTER — OUTPATIENT (OUTPATIENT)
Dept: OUTPATIENT SERVICES | Facility: HOSPITAL | Age: 68
LOS: 1 days | End: 2023-10-23
Payer: MEDICARE

## 2023-10-23 ENCOUNTER — APPOINTMENT (OUTPATIENT)
Dept: INFUSION THERAPY | Facility: CLINIC | Age: 68
End: 2023-10-23

## 2023-10-23 VITALS
DIASTOLIC BLOOD PRESSURE: 76 MMHG | WEIGHT: 147.93 LBS | HEART RATE: 64 BPM | RESPIRATION RATE: 18 BRPM | HEIGHT: 63 IN | TEMPERATURE: 98 F | OXYGEN SATURATION: 98 % | SYSTOLIC BLOOD PRESSURE: 139 MMHG

## 2023-10-23 VITALS
HEART RATE: 80 BPM | TEMPERATURE: 98 F | SYSTOLIC BLOOD PRESSURE: 121 MMHG | DIASTOLIC BLOOD PRESSURE: 68 MMHG | OXYGEN SATURATION: 97 % | RESPIRATION RATE: 18 BRPM

## 2023-10-23 DIAGNOSIS — N75.0 CYST OF BARTHOLIN'S GLAND: Chronic | ICD-10-CM

## 2023-10-23 DIAGNOSIS — C68.0 MALIGNANT NEOPLASM OF URETHRA: ICD-10-CM

## 2023-10-23 DIAGNOSIS — Z98.890 OTHER SPECIFIED POSTPROCEDURAL STATES: Chronic | ICD-10-CM

## 2023-10-23 DIAGNOSIS — Z90.79 ACQUIRED ABSENCE OF OTHER GENITAL ORGAN(S): Chronic | ICD-10-CM

## 2023-10-23 PROBLEM — Z87.898 HISTORY OF GROSS HEMATURIA: Status: RESOLVED | Noted: 2023-03-07 | Resolved: 2023-10-23

## 2023-10-23 PROBLEM — N32.89 BLADDER MASS: Status: RESOLVED | Noted: 2023-03-09 | Resolved: 2023-10-23

## 2023-10-23 PROBLEM — R33.8 ACUTE URINARY RETENTION: Status: RESOLVED | Noted: 2023-03-07 | Resolved: 2023-10-23

## 2023-10-23 LAB
T4 FREE SERPL-MCNC: 1.04 NG/DL — SIGNIFICANT CHANGE UP (ref 0.93–1.7)
T4 FREE SERPL-MCNC: 1.04 NG/DL — SIGNIFICANT CHANGE UP (ref 0.93–1.7)
TSH SERPL-MCNC: 2.94 UIU/ML — SIGNIFICANT CHANGE UP (ref 0.27–4.2)
TSH SERPL-MCNC: 2.94 UIU/ML — SIGNIFICANT CHANGE UP (ref 0.27–4.2)

## 2023-10-23 PROCEDURE — 84439 ASSAY OF FREE THYROXINE: CPT

## 2023-10-23 PROCEDURE — 84443 ASSAY THYROID STIM HORMONE: CPT

## 2023-10-23 PROCEDURE — 36415 COLL VENOUS BLD VENIPUNCTURE: CPT

## 2023-10-23 PROCEDURE — 96413 CHEMO IV INFUSION 1 HR: CPT

## 2023-10-23 RX ORDER — NIVOLUMAB 10 MG/ML
240 INJECTION INTRAVENOUS ONCE
Refills: 0 | Status: COMPLETED | OUTPATIENT
Start: 2023-10-23 | End: 2023-10-23

## 2023-10-23 RX ADMIN — NIVOLUMAB 240 MILLIGRAM(S): 10 INJECTION INTRAVENOUS at 11:09

## 2023-10-23 RX ADMIN — NIVOLUMAB 240 MILLIGRAM(S): 10 INJECTION INTRAVENOUS at 11:39

## 2023-10-23 NOTE — PHARMACY COMMUNICATION NOTE - COMMENTS
Per David, "We are ok to treat her w/o TSH and Hep B panel. We will check TSH when she comes in for cycle 2 on 11/10/2023." Per David, "We are ok to treat her w/o TSH and Hep B panel. We will check TSH when she comes in for cycle 2 on 11/10/2023."    Update: infusion nurse sent TSH 10/23/2023.

## 2023-10-24 LAB
FERRITIN SERPL-MCNC: 84 NG/ML
IRON SATN MFR SERPL: 28 %
IRON SERPL-MCNC: 77 UG/DL
TIBC SERPL-MCNC: 272 UG/DL
UIBC SERPL-MCNC: 195 UG/DL

## 2023-10-24 NOTE — PATIENT PROFILE ADULT - NSPROHMSYMPCOND_GEN_A_NUR
Pt was called, Sharon caregiver answered. Dr. Dolan's message relayed. Referral to Rheumatology sent. Sharon denies further questions or concerns.   genitourinary

## 2023-11-06 NOTE — PATIENT PROFILE ADULT - FALL HARM RISK - PATIENT NEEDS ASSISTANCE
PAST SURGICAL HISTORY:  History of appendectomy     History of tonsillectomy     S/P hip replacement partial, left     No assistance needed

## 2023-11-08 NOTE — DISCHARGE NOTE PROVIDER - PROVIDER RX CONTACT NUMBER
Richard Pizarro is here today for Medicare Wellness Visit    Concerns/symptoms: body itching request cream  Medications: medications verified and updated  Refills needed today? Yes, medications pended     Tobacco history: verified  Advanced Directives: No not on file, not interested.  BP greater than 140/90? No    Patient would like communication of their results via:      Cell Phone:   Telephone Information:   Mobile [phone]     Okay to leave a message containing results? Yes  Preferred language:  Croatian.    Health Maintenance Due   Topic Date Due   • DTaP/Tdap/Td Vaccine (1 - Tdap) Never done   • Shingles Vaccine (1 of 2) Never done   • Osteoporosis Screening  Never done   • Traditional Medicare- Medicare Wellness Visit  Never done   • Influenza Vaccine (1) 09/01/2023   • COVID-19 Vaccine (6 - 2023-24 season) 09/01/2023      Patient is due for the topics as listed above and wishes to proceed with them. Orders placed for Immunization(s) Influenza, Depression Screening  and MWV (Medicare Wellness Visit)..    Medicare HRA:              PHQ 2:  PHQ 2 Score Adult PHQ 2 Score Adult PHQ 2 Interpretation Little interest or pleasure in activity?   5/22/2023  11:30 AM 1 No further screening needed 1       PHQ 9:      [phone]

## 2023-11-10 ENCOUNTER — APPOINTMENT (OUTPATIENT)
Dept: HEMATOLOGY ONCOLOGY | Facility: CLINIC | Age: 68
End: 2023-11-10
Payer: MEDICARE

## 2023-11-10 ENCOUNTER — LABORATORY RESULT (OUTPATIENT)
Age: 68
End: 2023-11-10

## 2023-11-10 ENCOUNTER — OUTPATIENT (OUTPATIENT)
Dept: OUTPATIENT SERVICES | Facility: HOSPITAL | Age: 68
LOS: 1 days | End: 2023-11-10
Payer: MEDICARE

## 2023-11-10 ENCOUNTER — APPOINTMENT (OUTPATIENT)
Dept: RADIATION ONCOLOGY | Facility: CLINIC | Age: 68
End: 2023-11-10

## 2023-11-10 ENCOUNTER — APPOINTMENT (OUTPATIENT)
Dept: INFUSION THERAPY | Facility: CLINIC | Age: 68
End: 2023-11-10

## 2023-11-10 VITALS
WEIGHT: 147.93 LBS | HEART RATE: 85 BPM | TEMPERATURE: 98 F | RESPIRATION RATE: 18 BRPM | SYSTOLIC BLOOD PRESSURE: 103 MMHG | OXYGEN SATURATION: 98 % | DIASTOLIC BLOOD PRESSURE: 65 MMHG | HEIGHT: 63 IN

## 2023-11-10 VITALS
BODY MASS INDEX: 26.22 KG/M2 | OXYGEN SATURATION: 97 % | SYSTOLIC BLOOD PRESSURE: 134 MMHG | HEIGHT: 63 IN | TEMPERATURE: 97.9 F | RESPIRATION RATE: 18 BRPM | HEART RATE: 67 BPM | WEIGHT: 148 LBS | DIASTOLIC BLOOD PRESSURE: 74 MMHG

## 2023-11-10 DIAGNOSIS — R23.8 OTHER SKIN CHANGES: ICD-10-CM

## 2023-11-10 DIAGNOSIS — N75.0 CYST OF BARTHOLIN'S GLAND: Chronic | ICD-10-CM

## 2023-11-10 DIAGNOSIS — Z90.79 ACQUIRED ABSENCE OF OTHER GENITAL ORGAN(S): Chronic | ICD-10-CM

## 2023-11-10 DIAGNOSIS — Z98.890 OTHER SPECIFIED POSTPROCEDURAL STATES: Chronic | ICD-10-CM

## 2023-11-10 DIAGNOSIS — E61.1 IRON DEFICIENCY: ICD-10-CM

## 2023-11-10 DIAGNOSIS — C68.0 MALIGNANT NEOPLASM OF URETHRA: ICD-10-CM

## 2023-11-10 PROCEDURE — 99214 OFFICE O/P EST MOD 30 MIN: CPT | Mod: 25

## 2023-11-10 PROCEDURE — 96413 CHEMO IV INFUSION 1 HR: CPT

## 2023-11-10 PROCEDURE — 36415 COLL VENOUS BLD VENIPUNCTURE: CPT

## 2023-11-10 RX ORDER — NIVOLUMAB 10 MG/ML
240 INJECTION INTRAVENOUS ONCE
Refills: 0 | Status: COMPLETED | OUTPATIENT
Start: 2023-11-10 | End: 2023-11-10

## 2023-11-10 RX ADMIN — NIVOLUMAB 240 MILLIGRAM(S): 10 INJECTION INTRAVENOUS at 10:55

## 2023-11-10 RX ADMIN — Medication 300 UNIT(S): at 10:57

## 2023-11-10 RX ADMIN — NIVOLUMAB 240 MILLIGRAM(S): 10 INJECTION INTRAVENOUS at 10:25

## 2023-11-11 RX ORDER — HYDROCORTISONE 25 MG/ML
2.5 LOTION TOPICAL 3 TIMES DAILY
Qty: 2 | Refills: 3 | Status: DISCONTINUED | COMMUNITY
Start: 2023-11-10 | End: 2023-11-11

## 2023-11-13 PROBLEM — E61.1 IRON DEFICIENCY: Status: ACTIVE | Noted: 2023-05-04

## 2023-11-13 LAB
ALBUMIN SERPL ELPH-MCNC: 3.5 G/DL
ALP BLD-CCNC: 73 U/L
ALT SERPL-CCNC: 18 U/L
ANION GAP SERPL CALC-SCNC: 9 MMOL/L
AST SERPL-CCNC: 26 U/L
BILIRUB SERPL-MCNC: 0.6 MG/DL
BUN SERPL-MCNC: 10 MG/DL
CALCIUM SERPL-MCNC: 9.5 MG/DL
CHLORIDE SERPL-SCNC: 106 MMOL/L
CO2 SERPL-SCNC: 24 MMOL/L
CREAT SERPL-MCNC: 0.4 MG/DL
EGFR: 106 ML/MIN/1.73M2
GLUCOSE SERPL-MCNC: 152 MG/DL
POTASSIUM SERPL-SCNC: 3.3 MMOL/L
PROT SERPL-MCNC: 6.8 G/DL
SODIUM SERPL-SCNC: 139 MMOL/L

## 2023-11-13 RX ORDER — CLONAZEPAM 0.5 MG/1
0.5 TABLET ORAL
Qty: 60 | Refills: 0 | Status: ACTIVE | COMMUNITY
Start: 2023-05-19

## 2023-11-13 RX ORDER — OXYCODONE 5 MG/1
5 TABLET ORAL
Qty: 8 | Refills: 0 | Status: COMPLETED | COMMUNITY
Start: 2023-09-13

## 2023-11-20 ENCOUNTER — NON-APPOINTMENT (OUTPATIENT)
Age: 68
End: 2023-11-20

## 2023-11-27 ENCOUNTER — APPOINTMENT (OUTPATIENT)
Dept: UROLOGY | Facility: CLINIC | Age: 68
End: 2023-11-27
Payer: MEDICARE

## 2023-11-27 VITALS — SYSTOLIC BLOOD PRESSURE: 160 MMHG | HEART RATE: 78 BPM | DIASTOLIC BLOOD PRESSURE: 80 MMHG | TEMPERATURE: 98.2 F

## 2023-11-27 DIAGNOSIS — Z98.890 OTHER SPECIFIED POSTPROCEDURAL STATES: ICD-10-CM

## 2023-11-27 PROCEDURE — 99024 POSTOP FOLLOW-UP VISIT: CPT

## 2023-11-30 ENCOUNTER — APPOINTMENT (OUTPATIENT)
Dept: INFUSION THERAPY | Facility: CLINIC | Age: 68
End: 2023-11-30

## 2023-11-30 ENCOUNTER — LABORATORY RESULT (OUTPATIENT)
Age: 68
End: 2023-11-30

## 2023-11-30 ENCOUNTER — APPOINTMENT (OUTPATIENT)
Dept: HEMATOLOGY ONCOLOGY | Facility: CLINIC | Age: 68
End: 2023-11-30
Payer: MEDICARE

## 2023-11-30 ENCOUNTER — NON-APPOINTMENT (OUTPATIENT)
Age: 68
End: 2023-11-30

## 2023-11-30 ENCOUNTER — OUTPATIENT (OUTPATIENT)
Dept: OUTPATIENT SERVICES | Facility: HOSPITAL | Age: 68
LOS: 1 days | End: 2023-11-30
Payer: MEDICARE

## 2023-11-30 VITALS
WEIGHT: 150 LBS | TEMPERATURE: 98.5 F | RESPIRATION RATE: 18 BRPM | DIASTOLIC BLOOD PRESSURE: 85 MMHG | HEIGHT: 63 IN | SYSTOLIC BLOOD PRESSURE: 151 MMHG | OXYGEN SATURATION: 96 % | BODY MASS INDEX: 26.58 KG/M2 | HEART RATE: 81 BPM

## 2023-11-30 VITALS
SYSTOLIC BLOOD PRESSURE: 130 MMHG | HEART RATE: 75 BPM | TEMPERATURE: 98 F | DIASTOLIC BLOOD PRESSURE: 66 MMHG | OXYGEN SATURATION: 97 % | RESPIRATION RATE: 18 BRPM

## 2023-11-30 VITALS
HEIGHT: 63 IN | TEMPERATURE: 98 F | DIASTOLIC BLOOD PRESSURE: 81 MMHG | SYSTOLIC BLOOD PRESSURE: 148 MMHG | OXYGEN SATURATION: 97 % | WEIGHT: 149.91 LBS | RESPIRATION RATE: 18 BRPM | HEART RATE: 64 BPM

## 2023-11-30 DIAGNOSIS — Z98.890 OTHER SPECIFIED POSTPROCEDURAL STATES: Chronic | ICD-10-CM

## 2023-11-30 DIAGNOSIS — Z90.79 ACQUIRED ABSENCE OF OTHER GENITAL ORGAN(S): Chronic | ICD-10-CM

## 2023-11-30 DIAGNOSIS — C68.0 MALIGNANT NEOPLASM OF URETHRA: ICD-10-CM

## 2023-11-30 DIAGNOSIS — N75.0 CYST OF BARTHOLIN'S GLAND: Chronic | ICD-10-CM

## 2023-11-30 LAB
ALBUMIN SERPL ELPH-MCNC: 3.9 G/DL
ALP BLD-CCNC: 85 U/L
ALT SERPL-CCNC: 20 U/L
ANION GAP SERPL CALC-SCNC: 3 MMOL/L
AST SERPL-CCNC: 31 U/L
BILIRUB SERPL-MCNC: 0.6 MG/DL
BUN SERPL-MCNC: 12 MG/DL
CALCIUM SERPL-MCNC: 10.1 MG/DL
CHLORIDE SERPL-SCNC: 111 MMOL/L
CO2 SERPL-SCNC: 29 MMOL/L
CREAT SERPL-MCNC: 0.8 MG/DL
EGFR: 79 ML/MIN/1.73M2
GLUCOSE SERPL-MCNC: 94 MG/DL
POTASSIUM SERPL-SCNC: 4.3 MMOL/L
PROT SERPL-MCNC: 7.5 G/DL
SODIUM SERPL-SCNC: 143 MMOL/L
TSH SERPL-ACNC: 3.42 UIU/ML
VIT B12 SERPL-MCNC: 509 PG/ML

## 2023-11-30 PROCEDURE — 99214 OFFICE O/P EST MOD 30 MIN: CPT | Mod: 25

## 2023-11-30 PROCEDURE — 36415 COLL VENOUS BLD VENIPUNCTURE: CPT

## 2023-11-30 PROCEDURE — 96413 CHEMO IV INFUSION 1 HR: CPT

## 2023-11-30 RX ORDER — NIVOLUMAB 10 MG/ML
240 INJECTION INTRAVENOUS ONCE
Refills: 0 | Status: COMPLETED | OUTPATIENT
Start: 2023-11-30 | End: 2023-11-30

## 2023-11-30 RX ADMIN — NIVOLUMAB 240 MILLIGRAM(S): 10 INJECTION INTRAVENOUS at 12:35

## 2023-11-30 RX ADMIN — NIVOLUMAB 240 MILLIGRAM(S): 10 INJECTION INTRAVENOUS at 12:03

## 2023-11-30 RX ADMIN — Medication 300 UNIT(S): at 12:40

## 2023-12-05 ENCOUNTER — NON-APPOINTMENT (OUTPATIENT)
Age: 68
End: 2023-12-05

## 2023-12-08 ENCOUNTER — NON-APPOINTMENT (OUTPATIENT)
Age: 68
End: 2023-12-08

## 2023-12-11 ENCOUNTER — APPOINTMENT (OUTPATIENT)
Dept: RADIATION ONCOLOGY | Facility: CLINIC | Age: 68
End: 2023-12-11
Payer: MEDICARE

## 2023-12-11 VITALS
HEART RATE: 75 BPM | WEIGHT: 150 LBS | HEIGHT: 63 IN | OXYGEN SATURATION: 99 % | SYSTOLIC BLOOD PRESSURE: 138 MMHG | RESPIRATION RATE: 18 BRPM | TEMPERATURE: 98.1 F | BODY MASS INDEX: 26.58 KG/M2 | DIASTOLIC BLOOD PRESSURE: 89 MMHG

## 2023-12-11 PROCEDURE — 99205 OFFICE O/P NEW HI 60 MIN: CPT

## 2023-12-13 NOTE — PHYSICAL EXAM
[Normal] : oriented to person, place and time, the affect was normal, the mood was normal and not anxious [de-identified] : ileal conduit, clean and dry

## 2023-12-13 NOTE — REVIEW OF SYSTEMS
[FreeTextEntry8] : ileal conduit in place, radical cystectomy performed [FreeTextEntry9] : on immunotherapy

## 2023-12-13 NOTE — HISTORY OF PRESENT ILLNESS
[FreeTextEntry1] : Sandra Willis is a 68-year-old female with PMH HTN, MGUS Family history of bone Ca (father) being followed by Dr. Keller who had presented with gross hematuria and urinary retention in 4/2023 and underwent CT scan urogram that revealed a 4.4cm thick walled, rim enhancing mass with necrosis at the level of the urethra extending close to the left ureteral orifice. No lymphadenopathy, no visceral metastatic disease  March 9, 2023, cystoscopy showed a flat solid appearing tumor at the L trigone and Lateral wall of the bladder extending into the urethra. On 3/22/2023 underwent TURBT- biopsies from L bladder neck, L trigone, anterior bladder neck and bladder neck all showing high grade, muscle invasive urothelial carcinoma. 4/2023 CT C/A/P negative for thoracic metastatic disease.  Ms. Alston received neoadjuvant chemotherapy with cisplatin and gemcitabine X4 cycles (4/27/2023- 7/6/2023) July 2023 CT C/A/P after completing NACT showed partial response, no interval development of metastatic disease. On 9/5/2023 pt. underwent Robotic anterior exenteration, PLND with ileal conduit formation with Dr. Keller. Pathology pT3a, high grade urothelial carcinoma with glandular and mucinous features, infiltrates perivesical adipose tissue and urethra. Urethral margin positive for invasive high-grade urothelial carcinoma. 0/10 LN involved. [id number], stage IIIA On 10/23/2023, initiated adjuvant immunotherapy with Nivolumab 240mg Q 2 weeks  12/11/23: Patient presents for initial consultation. Following surgery, the patient states that she is doing well. She has occasional pulling sensation of the vagina. She has started to exercise. The ileal conduit is working well, no leakage. Her bowel movements are normal. From the immunotherapy, she has some muscle aches, bone/joint pain, headaches.

## 2023-12-18 NOTE — END OF VISIT
[] : Fellow [FreeTextEntry3] :  I evaluated the patient w/ the fellow, Dr Arellano.  The patient is receiving adjuvant nivolumab for her urothelial carcinoma of the bladder/urethra after failing to achieve a pathologic complete response with cisplatin based neoadjuvant chemotherapy.  Tolerating treatment without any dose-limiting checkpoint inhibitor toxicities.  Labs reviewed and are OK for this cycle of treatment.  RTC in 2-3 weeks for treatment #4 (she will delay a few days for travel) consider converting dose/schedule to the q-4week schedule after the holidays consult w/ Dr Lares re: RT given positive margin.  Unclear if this will benefit her, await his opinion.

## 2023-12-18 NOTE — REVIEW OF SYSTEMS
[Fever] : no fever [Chills] : no chills [Night Sweats] : no night sweats [Fatigue] : fatigue [Shortness Of Breath] : no shortness of breath [Recent Change In Weight] : ~T no recent weight change [Wheezing] : no wheezing [Cough] : no cough [SOB on Exertion] : no shortness of breath during exertion [Diarrhea: Grade 0] : Diarrhea: Grade 0 [Dysuria] : no dysuria [Incontinence] : no incontinence [Vaginal Discharge] : no vaginal discharge [Dysmenorrhea/Abn Vaginal Bleeding] : no dysmenorrhea/abnormal vaginal bleeding [Joint Pain] : joint pain [Joint Stiffness] : no joint stiffness [Muscle Pain] : muscle pain [Muscle Weakness] : no muscle weakness [Skin Rash] : skin rash [Skin Wound] : no skin wound [Negative] : Allergic/Immunologic [FreeTextEntry8] : ileal conduit  [de-identified] : had skin itching with erythema only around adhesive

## 2023-12-20 ENCOUNTER — LABORATORY RESULT (OUTPATIENT)
Age: 68
End: 2023-12-20

## 2023-12-20 ENCOUNTER — NON-APPOINTMENT (OUTPATIENT)
Age: 68
End: 2023-12-20

## 2023-12-20 ENCOUNTER — APPOINTMENT (OUTPATIENT)
Dept: HEMATOLOGY ONCOLOGY | Facility: CLINIC | Age: 68
End: 2023-12-20
Payer: MEDICARE

## 2023-12-20 ENCOUNTER — OUTPATIENT (OUTPATIENT)
Dept: OUTPATIENT SERVICES | Facility: HOSPITAL | Age: 68
LOS: 1 days | End: 2023-12-20
Payer: MEDICARE

## 2023-12-20 ENCOUNTER — APPOINTMENT (OUTPATIENT)
Dept: INFUSION THERAPY | Facility: CLINIC | Age: 68
End: 2023-12-20

## 2023-12-20 VITALS
SYSTOLIC BLOOD PRESSURE: 149 MMHG | RESPIRATION RATE: 18 BRPM | BODY MASS INDEX: 27.29 KG/M2 | DIASTOLIC BLOOD PRESSURE: 78 MMHG | WEIGHT: 154 LBS | OXYGEN SATURATION: 99 % | HEIGHT: 63 IN | HEART RATE: 75 BPM | TEMPERATURE: 97.5 F

## 2023-12-20 VITALS
TEMPERATURE: 97 F | HEIGHT: 63 IN | SYSTOLIC BLOOD PRESSURE: 149 MMHG | RESPIRATION RATE: 18 BRPM | OXYGEN SATURATION: 99 % | DIASTOLIC BLOOD PRESSURE: 78 MMHG | WEIGHT: 154.1 LBS | HEART RATE: 75 BPM

## 2023-12-20 DIAGNOSIS — Z98.890 OTHER SPECIFIED POSTPROCEDURAL STATES: Chronic | ICD-10-CM

## 2023-12-20 DIAGNOSIS — N75.0 CYST OF BARTHOLIN'S GLAND: Chronic | ICD-10-CM

## 2023-12-20 DIAGNOSIS — Z90.79 ACQUIRED ABSENCE OF OTHER GENITAL ORGAN(S): Chronic | ICD-10-CM

## 2023-12-20 DIAGNOSIS — C68.0 MALIGNANT NEOPLASM OF URETHRA: ICD-10-CM

## 2023-12-20 LAB
ALBUMIN SERPL ELPH-MCNC: 3.6 G/DL
ALP BLD-CCNC: 86 U/L
ALT SERPL-CCNC: 28 U/L
ANION GAP SERPL CALC-SCNC: 1 MMOL/L
AST SERPL-CCNC: 37 U/L
BILIRUB SERPL-MCNC: 0.7 MG/DL
BUN SERPL-MCNC: 11 MG/DL
CALCIUM SERPL-MCNC: 9.6 MG/DL
CHLORIDE SERPL-SCNC: 112 MMOL/L
CO2 SERPL-SCNC: 28 MMOL/L
CREAT SERPL-MCNC: 0.6 MG/DL
EGFR: 96 ML/MIN/1.73M2
GLUCOSE SERPL-MCNC: 115 MG/DL
POTASSIUM SERPL-SCNC: 4.2 MMOL/L
PROT SERPL-MCNC: 7.1 G/DL
SODIUM SERPL-SCNC: 141 MMOL/L

## 2023-12-20 PROCEDURE — 99213 OFFICE O/P EST LOW 20 MIN: CPT | Mod: 25

## 2023-12-20 PROCEDURE — 36415 COLL VENOUS BLD VENIPUNCTURE: CPT

## 2023-12-20 PROCEDURE — 96413 CHEMO IV INFUSION 1 HR: CPT

## 2023-12-20 RX ORDER — SODIUM CHLORIDE 9 MG/ML
10 INJECTION INTRAMUSCULAR; INTRAVENOUS; SUBCUTANEOUS ONCE
Refills: 0 | Status: COMPLETED | OUTPATIENT
Start: 2023-12-20 | End: 2023-12-20

## 2023-12-20 RX ORDER — NIVOLUMAB 10 MG/ML
240 INJECTION INTRAVENOUS ONCE
Refills: 0 | Status: COMPLETED | OUTPATIENT
Start: 2023-12-20 | End: 2023-12-20

## 2023-12-20 RX ADMIN — NIVOLUMAB 240 MILLIGRAM(S): 10 INJECTION INTRAVENOUS at 16:20

## 2023-12-20 RX ADMIN — NIVOLUMAB 240 MILLIGRAM(S): 10 INJECTION INTRAVENOUS at 15:44

## 2023-12-20 RX ADMIN — SODIUM CHLORIDE 10 MILLILITER(S): 9 INJECTION INTRAMUSCULAR; INTRAVENOUS; SUBCUTANEOUS at 16:25

## 2023-12-20 NOTE — ASSESSMENT
[FreeTextEntry1] : Sandra Alston is a 68 year old female who presented with gross hematuria and urinary retention. CT urogram showed a 4 cm tumor in the bladder and extending into the mid urethra. No lymphadenopathy or distant metastatic disease on CT c/a/p. 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 Completed neoadjuvant chemotherapy with gemcitabine and cisplatin 4/27/23- 7/6/23.  S/p robotic anterior exenteration, pelvic LN dissection with ileal conduit formation on 9/5/23 (Dr Keller). Pathology showed residual high grade urothelial carcinoma with glandular and mucinous features, infiltrates perivesical adipose tissue and urethra.  Urethral margin positive for invasive carcinoma.  0/10 LN involved.  [id number] Initiated adjuvant immunotherapy with Nivolumab on 10/23/23  Plan: 1. 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  High risk for recurrence/metastasis --recommended adjuvant treatment w/ nivolumab x 1 year. Initiated nivolumab on 10/23/23, tolerating treatment well without any dose-limiting checkpoint inhibitor toxicities.  Labs reviewed and ok to proceed with C4 Nivolumab today. Will switch Nivolumab dose/schedule to the q-4week schedule for next cycle on 1/5/24 --saw Dr. Lares on 12/11/23 given positive margin and plan is to obtain MRI of the pelvis first and decide RT.  MRI scheduled for 12/29. --TSH every 4-6 weeks, repeated 11/30, WNL   2.MGUS - history noted, follows with hematologist on [place]. defer further w/u for now.    3..h/o Iron deficiency --on PO iron EOD, Ferritin/Iron/TIBC WNL on 10/20/23.  --Hgb 11.6 today  4. health maintenance --no need for pap smear given h/o radical hysterectomy on 9/5/23 --last mammogram in March 2022, will order mammogram in December or early January as her request.   RTC with C5 nivolumab 480mg on 1/5/24 labs- CBC, CMP,TSH/FT4

## 2023-12-20 NOTE — DISCHARGE INSTRUCTIONS: CHEMOTHERAPY - NSAPPTSFOLLOWUP_HEME_A_AMB
[facility]... [facility]...

## 2023-12-20 NOTE — PHYSICAL EXAM
[Restricted in physically strenuous activity but ambulatory and able to carry out work of a light or sedentary nature] : Status 1- Restricted in physically strenuous activity but ambulatory and able to carry out work of a light or sedentary nature, e.g., light house work, office work [de-identified] : mild skin irritation around adhesive bandage [Normal] : no peripheral adenopathy appreciated [de-identified] : supple [de-identified] : chemoport in place, no sign of infection  [de-identified] : not distended, soft  [de-identified] : +ileal conduit

## 2023-12-20 NOTE — HISTORY OF PRESENT ILLNESS
[de-identified] : Sandra Alston is a 68 year old  referred by Dr. Moulton for evaluation for urothelial carcinoma involving bladder and urethra.    Oncologic history: 1.  urothelial carcinoma involving bladder and urethra --presented with hematuria and urinary retention --CT scan urogram 3/8/23 showed a 4.5 cm thick walled, rim enhancing mass with necrosis at the level of the urethra extending close to the L ureteral orifice.  No lymphadenopathy.  no visceral metastatic disease --cystoscopy 3/9/23 showed a flat solid appearing tumor at the L trigone and Lateral wall of the bladder extending into the urethra --TURBT 3/22/23:  biopsies from L bladder neck, L trigone, anterior bladder neck, and bladder neck all showing high grade, muscle invasive urothelial carcinoma. --chest CT 4/14/23 negative for thoracic metastatic disease --neoadjuvant chemotherapy with cisplatin and gemcitabine x 4 cycles 4/27/23 - 7/6/23 --CT c/a/p 7/2023 after completing NACT showed partial response, no interval development of metastatic disease --s/p robotic anterior exenteration, PLND with ileal conduit formation 9/5/23 (Dr Keller). Pathology: pT3a, high grade urothelial carcinoma with glandular and mucinous features, infiltrates perivesical adipose tissue and urethra.  Urethral margin positive for invasive carcinoma.  0/10 LN involved.  [id number] --initiated adjuvant immunotherapy with Nivolumab 240mg Q 2weeks on 10/23/23  PMH also notable for MGUS - no records - follows with Dr Brown on [place].  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 , lives on [place] [de-identified] : Here for f/u with cycle 4 nivolumab.   she reports occasional headache, tylenol with relief, abdominal pain, and mild fatigue. eating ok.  + occasional pinkish vaginal discharge. no pain.  overall she is feeling good. No new symptoms including cough, skin rash with itching, diarrhea, fever, or SOB.

## 2023-12-20 NOTE — DISCHARGE INSTRUCTIONS: CHEMOTHERAPY - NSFACILITYCONTAFTRHOUR_HEME_A_AMB
[facility] ([phone]) [facility] ([phone])

## 2023-12-29 ENCOUNTER — OUTPATIENT (OUTPATIENT)
Dept: OUTPATIENT SERVICES | Facility: HOSPITAL | Age: 68
LOS: 1 days | End: 2023-12-29
Payer: MEDICARE

## 2023-12-29 ENCOUNTER — APPOINTMENT (OUTPATIENT)
Dept: MRI IMAGING | Facility: HOSPITAL | Age: 68
End: 2023-12-29

## 2023-12-29 DIAGNOSIS — Z90.79 ACQUIRED ABSENCE OF OTHER GENITAL ORGAN(S): Chronic | ICD-10-CM

## 2023-12-29 DIAGNOSIS — N75.0 CYST OF BARTHOLIN'S GLAND: Chronic | ICD-10-CM

## 2023-12-29 DIAGNOSIS — Z98.890 OTHER SPECIFIED POSTPROCEDURAL STATES: Chronic | ICD-10-CM

## 2023-12-29 PROCEDURE — 72197 MRI PELVIS W/O & W/DYE: CPT

## 2023-12-29 PROCEDURE — A9585: CPT

## 2023-12-29 PROCEDURE — 72197 MRI PELVIS W/O & W/DYE: CPT | Mod: 26

## 2024-01-05 ENCOUNTER — LABORATORY RESULT (OUTPATIENT)
Age: 69
End: 2024-01-05

## 2024-01-05 ENCOUNTER — NON-APPOINTMENT (OUTPATIENT)
Age: 69
End: 2024-01-05

## 2024-01-05 ENCOUNTER — APPOINTMENT (OUTPATIENT)
Dept: INFUSION THERAPY | Facility: CLINIC | Age: 69
End: 2024-01-05

## 2024-01-05 ENCOUNTER — OUTPATIENT (OUTPATIENT)
Dept: OUTPATIENT SERVICES | Facility: HOSPITAL | Age: 69
LOS: 1 days | End: 2024-01-05
Payer: MEDICARE

## 2024-01-05 ENCOUNTER — APPOINTMENT (OUTPATIENT)
Dept: HEMATOLOGY ONCOLOGY | Facility: CLINIC | Age: 69
End: 2024-01-05
Payer: MEDICARE

## 2024-01-05 VITALS
TEMPERATURE: 98 F | DIASTOLIC BLOOD PRESSURE: 70 MMHG | OXYGEN SATURATION: 96 % | SYSTOLIC BLOOD PRESSURE: 131 MMHG | RESPIRATION RATE: 16 BRPM | HEART RATE: 61 BPM

## 2024-01-05 VITALS
HEIGHT: 63 IN | SYSTOLIC BLOOD PRESSURE: 143 MMHG | WEIGHT: 154.32 LBS | HEART RATE: 65 BPM | RESPIRATION RATE: 18 BRPM | TEMPERATURE: 97 F | OXYGEN SATURATION: 97 % | DIASTOLIC BLOOD PRESSURE: 79 MMHG

## 2024-01-05 DIAGNOSIS — N75.0 CYST OF BARTHOLIN'S GLAND: Chronic | ICD-10-CM

## 2024-01-05 DIAGNOSIS — Z98.890 OTHER SPECIFIED POSTPROCEDURAL STATES: Chronic | ICD-10-CM

## 2024-01-05 DIAGNOSIS — Z90.79 ACQUIRED ABSENCE OF OTHER GENITAL ORGAN(S): Chronic | ICD-10-CM

## 2024-01-05 DIAGNOSIS — C68.0 MALIGNANT NEOPLASM OF URETHRA: ICD-10-CM

## 2024-01-05 LAB
ALBUMIN SERPL ELPH-MCNC: 3.9 G/DL
ALP BLD-CCNC: 72 U/L
ALT SERPL-CCNC: 21 U/L
ANION GAP SERPL CALC-SCNC: 3 MMOL/L
AST SERPL-CCNC: 28 U/L
BILIRUB SERPL-MCNC: 0.5 MG/DL
BUN SERPL-MCNC: 12 MG/DL
CALCIUM SERPL-MCNC: 10.1 MG/DL
CHLORIDE SERPL-SCNC: 109 MMOL/L
CO2 SERPL-SCNC: 29 MMOL/L
CREAT SERPL-MCNC: 0.5 MG/DL
EGFR: 101 ML/MIN/1.73M2
GLUCOSE SERPL-MCNC: 103 MG/DL
POTASSIUM SERPL-SCNC: 4.3 MMOL/L
PROT SERPL-MCNC: 7.1 G/DL
SODIUM SERPL-SCNC: 141 MMOL/L

## 2024-01-05 PROCEDURE — 36415 COLL VENOUS BLD VENIPUNCTURE: CPT

## 2024-01-05 PROCEDURE — 96413 CHEMO IV INFUSION 1 HR: CPT

## 2024-01-05 PROCEDURE — 99214 OFFICE O/P EST MOD 30 MIN: CPT | Mod: 25

## 2024-01-05 RX ORDER — NIVOLUMAB 10 MG/ML
480 INJECTION INTRAVENOUS ONCE
Refills: 0 | Status: COMPLETED | OUTPATIENT
Start: 2024-01-05 | End: 2024-01-05

## 2024-01-05 RX ADMIN — NIVOLUMAB 480 MILLIGRAM(S): 10 INJECTION INTRAVENOUS at 13:07

## 2024-01-05 RX ADMIN — NIVOLUMAB 480 MILLIGRAM(S): 10 INJECTION INTRAVENOUS at 13:37

## 2024-01-05 NOTE — DISCHARGE INSTRUCTIONS: CHEMOTHERAPY - NSAPPTSFOLLOWUP_HEME_A_AMB
[facility]... [facility]...

## 2024-01-05 NOTE — HISTORY OF PRESENT ILLNESS
[de-identified] : Sandra Alston is a 68 year old  referred by Dr. Moulton for evaluation for urothelial carcinoma involving bladder and urethra.    Oncologic history: 1.  urothelial carcinoma involving bladder and urethra --presented with hematuria and urinary retention --CT scan urogram 3/8/23 showed a 4.5 cm thick walled, rim enhancing mass with necrosis at the level of the urethra extending close to the L ureteral orifice.  No lymphadenopathy.  no visceral metastatic disease --cystoscopy 3/9/23 showed a flat solid appearing tumor at the L trigone and Lateral wall of the bladder extending into the urethra --TURBT 3/22/23:  biopsies from L bladder neck, L trigone, anterior bladder neck, and bladder neck all showing high grade, muscle invasive urothelial carcinoma. --chest CT 4/14/23 negative for thoracic metastatic disease --neoadjuvant chemotherapy with cisplatin and gemcitabine x 4 cycles 4/27/23 - 7/6/23 --CT c/a/p 7/2023 after completing NACT showed partial response, no interval development of metastatic disease --s/p robotic anterior exenteration, PLND with ileal conduit formation 9/5/23 (Dr Keller). Pathology: pT3a, high grade urothelial carcinoma with glandular and mucinous features, infiltrates perivesical adipose tissue and urethra.  Urethral margin positive for invasive carcinoma.  0/10 LN involved.  [id number] --initiated adjuvant immunotherapy with Nivolumab on 10/23/23  PMH also notable for MGUS - no records - follows with Dr Brown on [place].  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   + occasional pinkish vaginal discharge. no pain.  mild fatigue after infusion. No new symptoms including cough, rash, diarrhea, fever, or SOB.

## 2024-01-05 NOTE — DISCHARGE INSTRUCTIONS: CHEMOTHERAPY - NSFACILITYCONTAFTRHOUR_HEME_A_AMB
[facility] ([phone]) [facility] ([phone])

## 2024-01-05 NOTE — PHYSICAL EXAM
[Restricted in physically strenuous activity but ambulatory and able to carry out work of a light or sedentary nature] : Status 1- Restricted in physically strenuous activity but ambulatory and able to carry out work of a light or sedentary nature, e.g., light house work, office work [de-identified] : supple [Normal] : supple without JVD, no thyromegaly or masses appreciated [de-identified] : chemoport in place, no sign of infection  [de-identified] : not distended, soft  [de-identified] : +ileal conduit

## 2024-01-05 NOTE — ASSESSMENT
[FreeTextEntry1] : Sandra Alston is a 68 year old female who presented with gross hematuria and urinary retention. CT urogram showed a 4 cm tumor in the bladder and extending into the mid urethra. No lymphadenopathy or distant metastatic disease on CT c/a/p. 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 Completed neoadjuvant chemotherapy with gemcitabine and cisplatin 4/27/23- 7/6/23.  S/p robotic anterior exenteration, pelvic LN dissection with ileal conduit formation on 9/5/23 (Dr Keller). Pathology showed residual high grade urothelial carcinoma with glandular and mucinous features, infiltrates perivesical adipose tissue and urethra.  Urethral margin positive for invasive carcinoma.  0/10 LN involved.  [id number] Initiated adjuvant immunotherapy with Nivolumab on 10/23/23  Plan: 1. 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  She had extensive residual disease after neoadjuvant chemotherapy and a positive surgical margin.  High risk for recurrence/metastasis --recommended adjuvant treatment w/ nivolumab x 1 year. Initiated nivolumab on 10/23/23, tolerating treatment well without any dose-limiting checkpoint inhibitor toxicities.  Labs reviewed and ok to proceed with Nivolumab treatment today. Will switch Nivolumab dose/schedule to the q-4week schedule --saw Dr. Lares on 12/11/23 given positive margin for consideration for RT.  He requested pelvic MRI.  discussed MRI results w/ the patient.  No definitive evidence for recurrence reported by radiology from the MRI, but there was non-mass enhancement indeterminate for residual disease versus post-op changes.   Dr Lares has recommended RT and she will proceed in this fashion.  Will continue immunotherapy through the course of RT treatment as long as she is tolerating. --she should have restaging scans to r/o interval development of metastasis in chest/liver/upper abdomen prior to proceeding w/ RT.  CT chest/abdomen ordered.  Deferring CT pelvis since she just had MRI.  --TSH every 4-6 weeks, pending today  2.MGUS - history noted, follows with hematologist on [place]. defer further w/u for now.    3..h/o Iron deficiency --on PO iron EOD, Ferritin/Iron/TIBC WNL on 10/20/23.  Hgb stable today.  4. health maintenance --no need for pap smear given h/o radical hysterectomy on 9/5/23 --last mammogram in March 2022, should proceed w/ mammogram this year if she remains cancer-free.  RTC with C6 nivolumab 480mg on 2/2/24 labs- CBC, CMP

## 2024-01-07 LAB — TSH SERPL-ACNC: 2.15 UIU/ML

## 2024-01-16 ENCOUNTER — NON-APPOINTMENT (OUTPATIENT)
Age: 69
End: 2024-01-16

## 2024-01-17 ENCOUNTER — APPOINTMENT (OUTPATIENT)
Dept: CT IMAGING | Facility: HOSPITAL | Age: 69
End: 2024-01-17

## 2024-01-17 ENCOUNTER — OUTPATIENT (OUTPATIENT)
Dept: OUTPATIENT SERVICES | Facility: HOSPITAL | Age: 69
LOS: 1 days | End: 2024-01-17
Payer: MEDICARE

## 2024-01-17 DIAGNOSIS — Z90.79 ACQUIRED ABSENCE OF OTHER GENITAL ORGAN(S): Chronic | ICD-10-CM

## 2024-01-17 DIAGNOSIS — Z98.890 OTHER SPECIFIED POSTPROCEDURAL STATES: Chronic | ICD-10-CM

## 2024-01-17 DIAGNOSIS — N75.0 CYST OF BARTHOLIN'S GLAND: Chronic | ICD-10-CM

## 2024-01-17 LAB — POCT ISTAT CREATININE: 0.7 MG/DL — SIGNIFICANT CHANGE UP (ref 0.5–1.3)

## 2024-01-17 PROCEDURE — 71260 CT THORAX DX C+: CPT

## 2024-01-17 PROCEDURE — 71260 CT THORAX DX C+: CPT | Mod: 26

## 2024-01-17 PROCEDURE — 82565 ASSAY OF CREATININE: CPT

## 2024-01-17 PROCEDURE — 74160 CT ABDOMEN W/CONTRAST: CPT

## 2024-01-17 PROCEDURE — 74160 CT ABDOMEN W/CONTRAST: CPT | Mod: 26

## 2024-01-22 ENCOUNTER — NON-APPOINTMENT (OUTPATIENT)
Age: 69
End: 2024-01-22

## 2024-01-23 ENCOUNTER — APPOINTMENT (OUTPATIENT)
Dept: UROLOGY | Facility: CLINIC | Age: 69
End: 2024-01-23
Payer: MEDICARE

## 2024-01-23 ENCOUNTER — RESULT REVIEW (OUTPATIENT)
Age: 69
End: 2024-01-23

## 2024-01-23 VITALS
BODY MASS INDEX: 28.17 KG/M2 | HEART RATE: 66 BPM | TEMPERATURE: 96.26 F | SYSTOLIC BLOOD PRESSURE: 159 MMHG | DIASTOLIC BLOOD PRESSURE: 76 MMHG | HEIGHT: 63 IN | WEIGHT: 159 LBS | OXYGEN SATURATION: 98 %

## 2024-01-23 PROCEDURE — 99214 OFFICE O/P EST MOD 30 MIN: CPT

## 2024-01-25 ENCOUNTER — TRANSCRIPTION ENCOUNTER (OUTPATIENT)
Age: 69
End: 2024-01-25

## 2024-01-30 ENCOUNTER — OUTPATIENT (OUTPATIENT)
Dept: OUTPATIENT SERVICES | Facility: HOSPITAL | Age: 69
LOS: 1 days | End: 2024-01-30
Payer: MEDICARE

## 2024-01-30 ENCOUNTER — APPOINTMENT (OUTPATIENT)
Dept: ULTRASOUND IMAGING | Facility: HOSPITAL | Age: 69
End: 2024-01-30
Payer: MEDICARE

## 2024-01-30 DIAGNOSIS — Z98.890 OTHER SPECIFIED POSTPROCEDURAL STATES: Chronic | ICD-10-CM

## 2024-01-30 DIAGNOSIS — N75.0 CYST OF BARTHOLIN'S GLAND: Chronic | ICD-10-CM

## 2024-01-30 DIAGNOSIS — Z90.79 ACQUIRED ABSENCE OF OTHER GENITAL ORGAN(S): Chronic | ICD-10-CM

## 2024-01-30 PROCEDURE — 76775 US EXAM ABDO BACK WALL LIM: CPT

## 2024-01-30 PROCEDURE — 76775 US EXAM ABDO BACK WALL LIM: CPT | Mod: 26

## 2024-01-30 NOTE — HISTORY OF PRESENT ILLNESS
[FreeTextEntry1] : Language: English Date of First visit: 3/9/2023 Accompanied by: - Pratik Contact info: *** Referring Provider/PCP: Alondra Poole [phone] fax [phone]    CC/ Problem List:  =============================================================================== FIRST VISIT: The patient was a 68 year female with an extensive smoking history and kidney stones who first presented on 2023 for gross hematuria.  She first noticed some blood on 3/3/2023 and went to the ER on Saturday for urinary retention. Dubose catheter was placed. PVR 1100 ml. No imaging was done in the ER. Patient was started on Cefpodoxime 200 mg BID for 10 days. She does reports clots and the urine is starting to clear up. Denies fevers, but did have chills.   She is an active smoker. Reports that she has cut down to 4 cigarettes a day over the past year. She has had kidney stones in the past, saw Dr. Rubin had started ER on medication to "break up the stones". Hematuria w/u in 2018 was negative with Dr. Rubin included cysto and CT Urogram.    ------------------------------------------------------------------------------------------- INTERVAL VISITS:  Her cysto showed bladder and urethral abnormalities that almost seemed submucosal  On 3/22/2023 she underwent a TURBT of her bladder neck and bladder which revealed at least T2 HG TCC. Interestingly some of the surface mucosa was NOT involved. She has been doing well with her dubose. She has not had dysuria/urethral burning. She quit smoking in mid March. She is eating a lot. On 2023 she passed her TOV but on 2023 th pt had her dubose put back in for retention. They just called her with results saying she had a UTI. She was asymptomatic.   She saw Dr. Banks and was started on neoadjuvant chemo ([facility]) on 2023. She finished 4 cycles on 2023. A f/u Ct showed a decreased bladder mass size. On 2023 she underwent a robotic anterior exenteration, PLND, open ileal conduit. She had pT3N0 disease with a positive margin. She then started adjuvant Nivolumab in 2023.  Because of her positive urethral margin she was evaluated for RT. An MRI of the pelvis in Dec 2023 showed indeterminate residual disease. A CT chest in 2024 showed no evidence of mets.  The patient's age today 2024 is 68 year old. Please note interval events and changes in PMH, PSH, MEDS and ALLERGIES were reviewed. She occasionally gets a little discharge from down below. It is yellow or pink. Her ostomy is working well and fills up frequently  ===============================================================================  PMH: pre-HTN, Sleep apnea, M spike (monoclonal spike), urothelial carcinoma PSH: Bartholin's cyst, TURBT POBH: (if applicable)  FH: Mother had heart disease, Father had bone cancer  ALL: NKDA MEDS: Amlodipine, Cymbalta, Clonopin, Crestor, Vit D, MVI, Calcium, Pre-biotic/probiotic, Metamucil, Beet juice SOC: quit smoking, Denies EtOH, denies drugs   ROS: Review of Systems is as per HPI unless otherwise denoted below   =============================================================================== DATA:   LABS:------------------------------------------------------------------------------------------------------------------- 3/7/2023: HCT 32.7, sCRe 0.53 3/9/2023: Urine culture negative 2024: sCre 0.50  RADS:------------------------------------------------------------------------------------------------------------------- 3/8/2023: CTU 4.5 cm irregular thick walled rim-enhancing mass with central cystic/necrotic changes centered at level of urethra (left side near the bladder, moving more anteriorly distally) extending close to left ureteral orifice without hydronephrosis, suspect urothelial malignancy. Recommend cystoscopy and tissue biopsy correlation. No evidence of upper tract disease, distant metastases or suspicious lymphadenopathy. Thickened endometrial complex, correlate with pathology results given history of recent biopsy. films personally reviewed  2023: NCCT Thyroid nodule. (she had those recently checked) MIld centrilobular and moderate paraseptal emphysema is most pronounces in the apices. There are associated Blebs. Mild scarring. No discrete nodules. Mild ectasia. No interstitial disease. ADrenals are normal. No bone lesions   2023: CT CAP No evidence of mets. No sig hydro. Mild pelviectasis and ureteral fullness to level of bladder. Urethral lesion is smaller than prior with some enhancement.   2023: MRI pelvis Non-mass-like enhancement in the cystectomy surgical bed could be posttreatment related changes versus residual/recurrent tumor.  Consider FDG PET/CT correlation  2024: CT chest 1.  No evidence of abdominal or thoracic metastasis. 2.  Stable mild bilateral pelviectasis.   PATHOLOGY/CYTOLOGY:------------------------------------------------------------------------------------------- 3/7/2023: Cytology [facility]  3/22/2023: TURBT with bladder neck and bladder sections a. Left bladder neck:  HG TCC with glandular differentiation, T2, no LVI Surface involvement not seen b Left trigone HG TCC with glandular differentiation, T2, no LVI c. Anterior bladder neck HG TCC, T2, no LVI, surface involvement not seen d. Bladder neck biopsy HG TCC with glandular differentiation, T2, No LVI, surface involvement not seen  2023: CT chest negative vfor mets  2023: Exenteration pT3a, high grade UC with glandular differentiation, infiltrates perivesical tissue and urethra Urethra margin with invasive urothelial cancer. Part of specimen was sent separately from bladder due to perivesical disease which may be cause of reported positive margin. Margin was grossly negative on resection 0/10 nodes negative   VOIDING STUDIES: ---------------------------------------------------------------------------------------------------- 2023: TOV I filled her with about 160cc. She voided into the toilet without straining. She did not leak. Her PVR was 23cc. We gave her a dose of Cefuroxime 500mg po.  2023: Dubose put back in for retention 2023: Dubose change 2023: Dubose change to 16Fr under aseptic conditions. Clear urine effluxed from catheter; Chaperone: AVEL Lin    STONE STUDIES: (Analysis/LLSA)----------------------------------------------------------------------------------    PROCEDURES: ----------------------------------------------------------------------------------------------- 3/9/2023: Cysto The patient had a flat solid appearing tumor involving the left trigone and lateral wall potentially going into the urethra.  There was some posterior wall / dome inflammation as well  I could not see either UO. ? Mass in the urethra A dubose was replaced after this procedure under aseptic conditions. Clear urine was obtained.  2023: Cath change The pts' catheter was changed aseptically to a new 16Fr Dubose connected to SD. Clear urine effluxed out.  2023: Cath change The pts' catheter was changed aseptically to a new 16Fr Dubose connected to SD. Clear urine effluxed out. Tolerated well  ===============================================================================  PHYSICAL EXAM:  GEN: AAOx3, NAD, Habitus: normal  BARRIERS to CARE: none  PSYCH: Appropriate Behavior, Affect Congruent  HEENT: AT/NC Trachea midline.   Lungs: No labored breathing.  NEURO: + Movement, all 4 extremities grossly intact without deficits. No tremors.  SKIN: Warm dry. No visible rashes or ulcers  GAIT: Gait normal, Stability good  ABD: pink stoma with clear urine in ostomy : no fungating mass. Area of urethra is slightly indurated but NT w/ no clear mass; no discharge  =======================================================================================   ASSESSMENT and PLAN  The patient is a 68 year female with a history of the followin. Muscle invasive HG TCC s/p exent for T3N0Mx bladder/urethral cancer, positive margin  She finished [facility] and we reviewed her CT scans and images.  She saw Dr. Banks and was started on neoadjuvant chemo ([facility]) on 2023. She finished 4 cycles on 2023. A f/u Ct showed a decreased bladder mass size. On 2023 she underwent a robotic anterior exenteration, PLND, open ileal conduit. She had pT3N0 disease with a positive margin. She then started adjuvant Nivolumab in 2023.  Because of her positive urethral margin she was evaluated for RT. An MRI of the pelvis in Dec 2023 showed indeterminate residual disease. A CT chest in 2024 showed no evidence of mets. She had a MRI pelvis and CT chest in Dec 2023/2024 but has not had renal imaging since her extenteration and diversion.   NCCN 3. guidelines for MIBC s/p cystectomy   CTU or MRU Q3-6 mos for two years, CT chest every 3-6 mos for two years OR FDG PET if mets are suspected After that Abd/pelvic CT/MRI and Chest imaging is done annually for years 3-5 and PET if mets is suspected until year five REnal sono annually years 5-10   CBC and CMP Q3-6 mos for a year if the pt received chemo; after one year BMP, LFT and B12 annually until year 5; B12 annually thereafter Cytology Q6-12 mos for two years Consider urethral wash cytology Q6-12 mos for two years; Years 3-10 cytology as indicated  We will have her go for a renal sonogram. She will need a repeat CTU and CT chest in 6 mos.  ----------------------------------------------------------------------------------------------------- LABS/TESTS Ordered: renal sono now and CTU and CT chest in 6 mos Meds Ordered: Follow up: after CT's -----------------------------------------------------------------------------------------------------  The total amount of time I have personally spent preparing for this visit, reviewing the patient's test results, obtaining external history, ordering tests/medications, documenting clinical information, communicating with and counseling the patient/family and/or caregiver(s), and spent face to face with the patient explaining the above was 35 minutes.  Thank you for allowing me to assist in the care of your patient. Should you have any questions please do not hesitate to reach out to me.   Hayley Moulton MD Associate  Department of Urology [facility] Phone: [phone] Fax: [phone] [address] [address]

## 2024-02-02 ENCOUNTER — LABORATORY RESULT (OUTPATIENT)
Age: 69
End: 2024-02-02

## 2024-02-02 ENCOUNTER — NON-APPOINTMENT (OUTPATIENT)
Age: 69
End: 2024-02-02

## 2024-02-02 ENCOUNTER — APPOINTMENT (OUTPATIENT)
Dept: INFUSION THERAPY | Facility: CLINIC | Age: 69
End: 2024-02-02

## 2024-02-02 ENCOUNTER — APPOINTMENT (OUTPATIENT)
Dept: HEMATOLOGY ONCOLOGY | Facility: CLINIC | Age: 69
End: 2024-02-02
Payer: MEDICARE

## 2024-02-02 ENCOUNTER — OUTPATIENT (OUTPATIENT)
Dept: OUTPATIENT SERVICES | Facility: HOSPITAL | Age: 69
LOS: 1 days | End: 2024-02-02
Payer: MEDICARE

## 2024-02-02 VITALS
WEIGHT: 159 LBS | OXYGEN SATURATION: 96 % | HEIGHT: 63 IN | DIASTOLIC BLOOD PRESSURE: 76 MMHG | BODY MASS INDEX: 28.17 KG/M2 | TEMPERATURE: 96.3 F | SYSTOLIC BLOOD PRESSURE: 156 MMHG | HEART RATE: 80 BPM | RESPIRATION RATE: 18 BRPM

## 2024-02-02 VITALS
SYSTOLIC BLOOD PRESSURE: 156 MMHG | HEART RATE: 80 BPM | TEMPERATURE: 96 F | OXYGEN SATURATION: 96 % | WEIGHT: 158.95 LBS | RESPIRATION RATE: 16 BRPM | HEIGHT: 63 IN | DIASTOLIC BLOOD PRESSURE: 76 MMHG

## 2024-02-02 VITALS
TEMPERATURE: 98 F | SYSTOLIC BLOOD PRESSURE: 130 MMHG | OXYGEN SATURATION: 97 % | DIASTOLIC BLOOD PRESSURE: 75 MMHG | RESPIRATION RATE: 16 BRPM | HEART RATE: 78 BPM

## 2024-02-02 DIAGNOSIS — Z98.890 OTHER SPECIFIED POSTPROCEDURAL STATES: Chronic | ICD-10-CM

## 2024-02-02 DIAGNOSIS — Z90.79 ACQUIRED ABSENCE OF OTHER GENITAL ORGAN(S): Chronic | ICD-10-CM

## 2024-02-02 DIAGNOSIS — N75.0 CYST OF BARTHOLIN'S GLAND: Chronic | ICD-10-CM

## 2024-02-02 DIAGNOSIS — Z86.39 PERSONAL HISTORY OF OTHER ENDOCRINE, NUTRITIONAL AND METABOLIC DISEASE: ICD-10-CM

## 2024-02-02 DIAGNOSIS — C68.0 MALIGNANT NEOPLASM OF URETHRA: ICD-10-CM

## 2024-02-02 LAB
ALBUMIN SERPL ELPH-MCNC: 3.8 G/DL
ALP BLD-CCNC: 75 U/L
ALT SERPL-CCNC: 21 U/L
ANION GAP SERPL CALC-SCNC: 3 MMOL/L
AST SERPL-CCNC: 29 U/L
BILIRUB SERPL-MCNC: 0.5 MG/DL
BUN SERPL-MCNC: 7 MG/DL
CALCIUM SERPL-MCNC: 9.8 MG/DL
CHLORIDE SERPL-SCNC: 108 MMOL/L
CO2 SERPL-SCNC: 31 MMOL/L
CREAT SERPL-MCNC: 0.7 MG/DL
EGFR: 93 ML/MIN/1.73M2
GLUCOSE SERPL-MCNC: 88 MG/DL
POTASSIUM SERPL-SCNC: 4.5 MMOL/L
PROT SERPL-MCNC: 7.3 G/DL
SODIUM SERPL-SCNC: 142 MMOL/L

## 2024-02-02 PROCEDURE — 36415 COLL VENOUS BLD VENIPUNCTURE: CPT

## 2024-02-02 PROCEDURE — 96413 CHEMO IV INFUSION 1 HR: CPT

## 2024-02-02 PROCEDURE — 99214 OFFICE O/P EST MOD 30 MIN: CPT | Mod: 25

## 2024-02-02 RX ORDER — HYDROCORTISONE 25 MG/G
2.5 CREAM TOPICAL 3 TIMES DAILY
Qty: 1 | Refills: 5 | Status: ACTIVE | COMMUNITY
Start: 2023-11-11 | End: 1900-01-01

## 2024-02-02 RX ORDER — NIVOLUMAB 10 MG/ML
480 INJECTION INTRAVENOUS ONCE
Refills: 0 | Status: COMPLETED | OUTPATIENT
Start: 2024-02-02 | End: 2024-02-02

## 2024-02-02 RX ADMIN — NIVOLUMAB 480 MILLIGRAM(S): 10 INJECTION INTRAVENOUS at 11:53

## 2024-02-02 RX ADMIN — NIVOLUMAB 480 MILLIGRAM(S): 10 INJECTION INTRAVENOUS at 12:25

## 2024-02-02 NOTE — ASSESSMENT
[FreeTextEntry1] : Sandra Alston is a 68 year old female who presented with gross hematuria and urinary retention. CT urogram showed a 4 cm tumor in the bladder and extending into the mid urethra. No lymphadenopathy or distant metastatic disease on CT c/a/p. 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 Completed neoadjuvant chemotherapy with gemcitabine and cisplatin 4/27/23- 7/6/23.  S/p robotic anterior exenteration, pelvic LN dissection with ileal conduit formation on 9/5/23 (Dr Keller). Pathology showed residual high grade urothelial carcinoma with glandular and mucinous features, infiltrates perivesical adipose tissue and urethra.  Urethral margin positive for invasive carcinoma.  0/10 LN involved.  [id number] Initiated adjuvant immunotherapy with Nivolumab on 10/23/23 RT to bladder started on 1/30/24, undergoing RT   Plan: 1. 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  She had extensive residual disease after neoadjuvant chemotherapy and a positive surgical margin.  High risk for recurrence/metastasis --recommended adjuvant treatment w/ nivolumab x 1 year. Initiated nivolumab on 10/23/23, tolerating treatment well without any dose-limiting checkpoint inhibitor toxicities. Nivolumab dose switched to the q-4week schedule on 1/5/24. No any issues with 4 week dose of Nivo. Labs reviewed and ok to proceed with Nivolumab 480mg today. --saw Dr. Lares on 12/11/23 given positive margin for consideration for RT.  He requested pelvic MRI.  discussed MRI results w/ the patient.  No definitive evidence for recurrence reported by radiology from the MRI, but there was non-mass enhancement indeterminate for residual disease versus post-op changes.  Will continue immunotherapy through the course of RT treatment as long as she is tolerating.  RT to bladder started on 1/30/24, until 3/20/24 --CT C/A on 1/17/24 showed no evidence of abdominal or thoracic metastasis. --TSH every 8 weeks --continue Nivolumab every 4 weeks and RT until mild March as planned    2.MGUS - history noted, follows with hematologist on [place]. defer further w/u for now.    3..h/o Iron deficiency --on PO iron EOD, Ferritin/Iron/TIBC WNL on 10/20/23.  Hgb normal   4. health maintenance --no need for pap smear given h/o radical hysterectomy on 9/5/23 --last mammogram in March 2022, should proceed w/ mammogram this year if she remains cancer-free.  Lab visit (lipid panel as here request) on 2/8/24 RTC with C7 nivolumab 480mg on 3/1/24 labs- CBC, CMP, TSH

## 2024-02-02 NOTE — HISTORY OF PRESENT ILLNESS
[de-identified] : Sandra Alston is a 68 year old  referred by Dr. Moulton for evaluation for urothelial carcinoma involving bladder and urethra.    Oncologic history: 1.  urothelial carcinoma involving bladder and urethra --presented with hematuria and urinary retention --CT scan urogram 3/8/23 showed a 4.5 cm thick walled, rim enhancing mass with necrosis at the level of the urethra extending close to the L ureteral orifice.  No lymphadenopathy.  no visceral metastatic disease --cystoscopy 3/9/23 showed a flat solid appearing tumor at the L trigone and Lateral wall of the bladder extending into the urethra --TURBT 3/22/23:  biopsies from L bladder neck, L trigone, anterior bladder neck, and bladder neck all showing high grade, muscle invasive urothelial carcinoma. --chest CT 4/14/23 negative for thoracic metastatic disease --neoadjuvant chemotherapy with cisplatin and gemcitabine x 4 cycles 4/27/23 - 7/6/23 --CT c/a/p 7/2023 after completing NACT showed partial response, no interval development of metastatic disease --s/p robotic anterior exenteration, PLND with ileal conduit formation 9/5/23 (Dr Keller). Pathology: pT3a, high grade urothelial carcinoma with glandular and mucinous features, infiltrates perivesical adipose tissue and urethra.  Urethral margin positive for invasive carcinoma.  0/10 LN involved.  [id number] --initiated adjuvant immunotherapy with Nivolumab on 10/23/23 --CT CA on 1/17/24 showed no evidence of abdominal or thoracic metastasis, stable mild bilateral pelviectasis.  PMH also notable for MGUS - no records - follows with Dr Brown on [place].  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 , lives on [place] [de-identified] : Here for f/u with nivolumab.  She started RT to bladder on 1/30/24.  + occasional pinkish vaginal discharge. no pain.  + mild fatigue, manageable. weight stable. +occasional diarrhea(taking Colace and Metamucil), abdominal bloating with gas, takes phazyme as prn with some help.  Otherwise she feels ok. No new symptoms including cough, rash, diarrhea, fever, or SOB.

## 2024-02-02 NOTE — PHYSICAL EXAM
[Restricted in physically strenuous activity but ambulatory and able to carry out work of a light or sedentary nature] : Status 1- Restricted in physically strenuous activity but ambulatory and able to carry out work of a light or sedentary nature, e.g., light house work, office work [Normal] : affect appropriate [de-identified] : chemoport in place, no sign of infection, mild pedal edema. [de-identified] : not distended, soft,  no tenderness [de-identified] : +ileal conduit

## 2024-02-05 ENCOUNTER — NON-APPOINTMENT (OUTPATIENT)
Age: 69
End: 2024-02-05

## 2024-02-08 ENCOUNTER — APPOINTMENT (OUTPATIENT)
Dept: HEMATOLOGY ONCOLOGY | Facility: CLINIC | Age: 69
End: 2024-02-08

## 2024-02-08 NOTE — HISTORY OF PRESENT ILLNESS
[FreeTextEntry1] : Sandra Willis is a 68-year-old female with PMH HTN, MGUS Family history of bone Ca (father) being followed by Dr. Keller who had presented with gross hematuria and urinary retention in 4/2023 and underwent CT scan urogram that revealed a 4.4cm thick walled, rim enhancing mass with necrosis at the level of the urethra extending close to the left ureteral orifice. No lymphadenopathy, no visceral metastatic disease  March 9, 2023, cystoscopy showed a flat solid appearing tumor at the L trigone and Lateral wall of the bladder extending into the urethra. On 3/22/2023 underwent TURBT- biopsies from L bladder neck, L trigone, anterior bladder neck and bladder neck all showing high grade, muscle invasive urothelial carcinoma. 4/2023 CT C/A/P negative for thoracic metastatic disease.  Ms. Alston received neoadjuvant chemotherapy with cisplatin and gemcitabine X4 cycles (4/27/2023- 7/6/2023) July 2023 CT C/A/P after completing NACT showed partial response, no interval development of metastatic disease. On 9/5/2023 pt. underwent Robotic anterior exenteration, PLND with ileal conduit formation with Dr. Keller. Pathology pT3a, high grade urothelial carcinoma with glandular and mucinous features, infiltrates tatiana vesical adipose tissue and urethra. Urethral margin positive for invasive high-grade urothelial carcinoma. 0/10 LN involved. [id number], stage IIIA On 10/23/2023, initiated adjuvant immunotherapy with Nivolumab 240mg Q 2 weeks  OTV APPT  2/5/2024-OTV- Mrs. Alston has received 4/14 Fx or  / 3960 cGy radiation to the bladder .Overall doing well.

## 2024-02-08 NOTE — REVIEW OF SYSTEMS
[Joint Pain] : joint pain [Negative] : Heme/Lymph [FreeTextEntry8] : ileal conduit in place, radical cystectomy performed [FreeTextEntry9] : on immunotherapy

## 2024-02-08 NOTE — DISEASE MANAGEMENT
[Pathological] : TNM Stage: p [IIIA] : IIIA [TTNM] : 3a [NTNM] : 0 [MTNM] : 0 [de-identified] : 9870cAw [de-identified] : Bladder

## 2024-02-08 NOTE — PHYSICAL EXAM
[General Appearance - Well Developed] : well developed [General Appearance - Well Nourished] : well nourished [Sclera] : the sclera and conjunctiva were normal [Extraocular Movements] : extraocular movements were intact [Outer Ear] : the ears and nose were normal in appearance [Hearing Threshold Finger Rub Not Desha] : hearing was normal [Exaggerated Use Of Accessory Muscles For Inspiration] : no accessory muscle use [Nondistended] : nondistended [Range of Motion to Joints] : range of motion to joints [Motor Tone] : muscle strength and tone were normal [Skin Color & Pigmentation] : normal skin color and pigmentation [] : no rash [No Focal Deficits] : no focal deficits [Normal] : oriented to person, place and time, the affect was normal, the mood was normal and not anxious [Oriented To Time, Place, And Person] : oriented to person, place, and time [de-identified] : ileal conduit, clean and dry

## 2024-02-09 LAB
CHOLEST SERPL-MCNC: 156 MG/DL
HDLC SERPL-MCNC: 59 MG/DL
LDLC SERPL CALC-MCNC: 76 MG/DL
NONHDLC SERPL-MCNC: 97 MG/DL
TRIGL SERPL-MCNC: 103 MG/DL

## 2024-02-12 ENCOUNTER — NON-APPOINTMENT (OUTPATIENT)
Age: 69
End: 2024-02-12

## 2024-02-12 NOTE — PHYSICAL EXAM
[General Appearance - Well Developed] : well developed [General Appearance - Well Nourished] : well nourished [Sclera] : the sclera and conjunctiva were normal [Extraocular Movements] : extraocular movements were intact [Outer Ear] : the ears and nose were normal in appearance [Hearing Threshold Finger Rub Not [place]] : hearing was normal [Exaggerated Use Of Accessory Muscles For Inspiration] : no accessory muscle use [Nondistended] : nondistended [Range of Motion to Joints] : range of motion to joints [Motor Tone] : muscle strength and tone were normal [Skin Color & Pigmentation] : normal skin color and pigmentation [] : no rash [No Focal Deficits] : no focal deficits [Normal] : oriented to person, place and time, the affect was normal, the mood was normal and not anxious [Oriented To Time, Place, And Person] : oriented to person, place, and time [de-identified] : ileal conduit, clean and dry

## 2024-02-12 NOTE — DISEASE MANAGEMENT
[Pathological] : TNM Stage: p [IIIA] : IIIA [TTNM] : 3a [NTNM] : 0 [MTNM] : 0 [de-identified] : 7040cIq [de-identified] : Bladder

## 2024-02-12 NOTE — HISTORY OF PRESENT ILLNESS
[FreeTextEntry1] : Sandra Willis is a 68-year-old female with PMH HTN, MGUS Family history of bone Ca (father) being followed by Dr. Keller who had presented with gross hematuria and urinary retention in 4/2023 and underwent CT scan urogram that revealed a 4.4cm thick walled, rim enhancing mass with necrosis at the level of the urethra extending close to the left ureteral orifice. No lymphadenopathy, no visceral metastatic disease  March 9, 2023, cystoscopy showed a flat solid appearing tumor at the L trigone and Lateral wall of the bladder extending into the urethra. On 3/22/2023 underwent TURBT- biopsies from L bladder neck, L trigone, anterior bladder neck and bladder neck all showing high grade, muscle invasive urothelial carcinoma. 4/2023 CT C/A/P negative for thoracic metastatic disease.  Ms. Alston received neoadjuvant chemotherapy with cisplatin and gemcitabine X4 cycles (4/27/2023- 7/6/2023) July 2023 CT C/A/P after completing NACT showed partial response, no interval development of metastatic disease. On 9/5/2023 pt. underwent Robotic anterior exenteration, PLND with ileal conduit formation with Dr. Keller. Pathology pT3a, high grade urothelial carcinoma with glandular and mucinous features, infiltrates tatiana vesical adipose tissue and urethra. Urethral margin positive for invasive high-grade urothelial carcinoma. 0/10 LN involved. [id number], stage IIIA On 10/23/2023, initiated adjuvant immunotherapy with Nivolumab 240mg Q 2 weeks  OTV APPT  2/123/2024-OTV- Mrs. Alston has completed   8/14Fx radiation to Bladder .She did not come in for RT today because she has covid. Spoke to her over the phone   Once she is better she will resume  2/5/2024-OTV- Mrs. Alston has received 4/14 Fx or  / 3960 cGy radiation to the bladder .Overall she is doing well .

## 2024-02-13 ENCOUNTER — NON-APPOINTMENT (OUTPATIENT)
Age: 69
End: 2024-02-13

## 2024-02-14 ENCOUNTER — NON-APPOINTMENT (OUTPATIENT)
Age: 69
End: 2024-02-14

## 2024-02-17 ENCOUNTER — NON-APPOINTMENT (OUTPATIENT)
Age: 69
End: 2024-02-17

## 2024-02-20 ENCOUNTER — NON-APPOINTMENT (OUTPATIENT)
Age: 69
End: 2024-02-20

## 2024-02-21 ENCOUNTER — NON-APPOINTMENT (OUTPATIENT)
Age: 69
End: 2024-02-21

## 2024-02-21 NOTE — DISEASE MANAGEMENT
[TTNM] : 3a [NTNM] : 0 [MTNM] : 0 [de-identified] : 6112cGy [de-identified] : 5800cKc [de-identified] : Bladder

## 2024-02-21 NOTE — REVIEW OF SYSTEMS
[FreeTextEntry8] : ileal conduit in place, radical cystectomy performed [FreeTextEntry9] : on immunotherapy [Anal Pain: Grade 0] : Anal Pain: Grade 0 [Constipation: Grade 0] : Constipation: Grade 0 [Diarrhea: Grade 0] : Diarrhea: Grade 0 [Dyspepsia: Grade 0] : Dyspepsia: Grade 0 [Dysphagia: Grade 0] : Dysphagia: Grade 0 [Esophagitis: Grade 0] : Esophagitis: Grade 0 [Fecal Incontinence: Grade 0] : Fecal Incontinence: Grade 0 [Gastroparesis: Grade 0] : Gastroparesis: Grade 0 [Nausea: Grade 0] : Nausea: Grade 0 [Proctitis: Grade 0] : Proctitis: Grade 0 [Rectal Pain: Grade 0] : Rectal Pain: Grade 0 [Small Intestinal Obstruction: Grade 0] : Small Intestinal Obstruction: Grade 0 [Vomiting: Grade 0] : Vomiting: Grade 0 [Hematuria: Grade 1 - Asymptomatic; clinical or diagnostic observations only; intervention not indicated] : Hematuria: Grade 1 - Asymptomatic; clinical or diagnostic observations only; intervention not indicated [Urinary Tract Pain: Grade 0] : Urinary Tract Pain: Grade 0 [Urinary Urgency: Grade 0] : Urinary Urgency: Grade 0 [Urinary Frequency: Grade 0] : Urinary Frequency: Grade 0 [FreeTextEntry2] : urostomy [Cognitive Disturbance: Grade 0] : Cognitive Disturbance: Grade 0 [Anxiety: Grade 0] : Anxiety: Grade 0  [Skin Atrophy: Grade 0] : Skin Atrophy: Grade 0 [Skin Hyperpigmentation: Grade 0] : Skin Hyperpigmentation: Grade 0 [Skin Induration: Grade 0] : Skin Induration: Grade 0 [Dermatitis Radiation: Grade 0] : Dermatitis Radiation: Grade 0

## 2024-02-21 NOTE — HISTORY OF PRESENT ILLNESS
[FreeTextEntry1] : Sandra Willis is a 68-year-old female with PMH HTN, MGUS Family history of bone Ca (father) being followed by Dr. Keller who had presented with gross hematuria and urinary retention in 4/2023 and underwent CT scan urogram that revealed a 4.4cm thick walled, rim enhancing mass with necrosis at the level of the urethra extending close to the left ureteral orifice. No lymphadenopathy, no visceral metastatic disease  March 9, 2023, cystoscopy showed a flat solid appearing tumor at the L trigone and Lateral wall of the bladder extending into the urethra. On 3/22/2023 underwent TURBT- biopsies from L bladder neck, L trigone, anterior bladder neck and bladder neck all showing high grade, muscle invasive urothelial carcinoma. 4/2023 CT C/A/P negative for thoracic metastatic disease.  Ms. Alston received neoadjuvant chemotherapy with cisplatin and gemcitabine X4 cycles (4/27/2023- 7/6/2023) July 2023 CT C/A/P after completing NACT showed partial response, no interval development of metastatic disease. On 9/5/2023 pt. underwent Robotic anterior exenteration, PLND with ileal conduit formation with Dr. Keller. Pathology pT3a, high grade urothelial carcinoma with glandular and mucinous features, infiltrates tatiana vesical adipose tissue and urethra. Urethral margin positive for invasive high-grade urothelial carcinoma. 0/10 LN involved. [id number], stage IIIA On 10/23/2023, initiated adjuvant immunotherapy with Nivolumab 240mg Q 2 weeks  OTV APPT:  2/21/2024-OTV- Mr. Alston has completed 9/14 Fx on her bladder. Overall she is doing well. Slight hematuria. Appetite is good and energy level is good. Just recovered from Covid virus. Her skin is intact around the vagina area. Denies any irritant to the skin and no skin pigmentatin. She will continue her current planned RT tratments.  2/5/2024-OTV- Mrs. Alston has received 4/14 Fx or  / 3960 cGy radiation to the bladder .Overall doing well.

## 2024-02-23 ENCOUNTER — OUTPATIENT (OUTPATIENT)
Dept: OUTPATIENT SERVICES | Facility: HOSPITAL | Age: 69
LOS: 1 days | End: 2024-02-23

## 2024-02-23 DIAGNOSIS — Z90.79 ACQUIRED ABSENCE OF OTHER GENITAL ORGAN(S): Chronic | ICD-10-CM

## 2024-02-23 DIAGNOSIS — N75.0 CYST OF BARTHOLIN'S GLAND: Chronic | ICD-10-CM

## 2024-02-23 DIAGNOSIS — Z98.890 OTHER SPECIFIED POSTPROCEDURAL STATES: Chronic | ICD-10-CM

## 2024-02-26 ENCOUNTER — NON-APPOINTMENT (OUTPATIENT)
Age: 69
End: 2024-02-26

## 2024-02-28 RX ORDER — SODIUM CHLORIDE 9 MG/ML
10 INJECTION INTRAMUSCULAR; INTRAVENOUS; SUBCUTANEOUS ONCE
Refills: 0 | Status: COMPLETED | OUTPATIENT
Start: 2024-03-01 | End: 2024-03-01

## 2024-02-28 RX ORDER — NIVOLUMAB 10 MG/ML
480 INJECTION INTRAVENOUS ONCE
Refills: 0 | Status: COMPLETED | OUTPATIENT
Start: 2024-03-01 | End: 2024-03-01

## 2024-03-01 ENCOUNTER — APPOINTMENT (OUTPATIENT)
Dept: HEMATOLOGY ONCOLOGY | Facility: CLINIC | Age: 69
End: 2024-03-01
Payer: MEDICARE

## 2024-03-01 ENCOUNTER — LABORATORY RESULT (OUTPATIENT)
Age: 69
End: 2024-03-01

## 2024-03-01 ENCOUNTER — OUTPATIENT (OUTPATIENT)
Dept: OUTPATIENT SERVICES | Facility: HOSPITAL | Age: 69
LOS: 1 days | End: 2024-03-01
Payer: MEDICARE

## 2024-03-01 ENCOUNTER — APPOINTMENT (OUTPATIENT)
Dept: INFUSION THERAPY | Facility: CLINIC | Age: 69
End: 2024-03-01

## 2024-03-01 VITALS
DIASTOLIC BLOOD PRESSURE: 76 MMHG | SYSTOLIC BLOOD PRESSURE: 118 MMHG | TEMPERATURE: 97.8 F | RESPIRATION RATE: 18 BRPM | BODY MASS INDEX: 28.17 KG/M2 | HEIGHT: 63 IN | HEART RATE: 69 BPM | OXYGEN SATURATION: 96 % | WEIGHT: 159 LBS

## 2024-03-01 VITALS
SYSTOLIC BLOOD PRESSURE: 117 MMHG | TEMPERATURE: 98 F | RESPIRATION RATE: 18 BRPM | OXYGEN SATURATION: 99 % | WEIGHT: 158.95 LBS | HEART RATE: 69 BPM | HEIGHT: 63 IN | DIASTOLIC BLOOD PRESSURE: 76 MMHG

## 2024-03-01 DIAGNOSIS — Z90.79 ACQUIRED ABSENCE OF OTHER GENITAL ORGAN(S): Chronic | ICD-10-CM

## 2024-03-01 DIAGNOSIS — N75.0 CYST OF BARTHOLIN'S GLAND: Chronic | ICD-10-CM

## 2024-03-01 DIAGNOSIS — Z98.890 OTHER SPECIFIED POSTPROCEDURAL STATES: Chronic | ICD-10-CM

## 2024-03-01 DIAGNOSIS — C68.0 MALIGNANT NEOPLASM OF URETHRA: ICD-10-CM

## 2024-03-01 LAB
ALBUMIN SERPL ELPH-MCNC: 3.8 G/DL
ALP BLD-CCNC: 75 U/L
ALT SERPL-CCNC: 16 U/L
ANION GAP SERPL CALC-SCNC: 4 MMOL/L
AST SERPL-CCNC: 22 U/L
BILIRUB SERPL-MCNC: 0.5 MG/DL
BUN SERPL-MCNC: 10 MG/DL
CALCIUM SERPL-MCNC: 9.6 MG/DL
CHLORIDE SERPL-SCNC: 108 MMOL/L
CO2 SERPL-SCNC: 28 MMOL/L
CREAT SERPL-MCNC: 0.6 MG/DL
EGFR: 96 ML/MIN/1.73M2
GLUCOSE SERPL-MCNC: 101 MG/DL
POTASSIUM SERPL-SCNC: 4.1 MMOL/L
PROT SERPL-MCNC: 6.9 G/DL
SODIUM SERPL-SCNC: 140 MMOL/L

## 2024-03-01 PROCEDURE — 96361 HYDRATE IV INFUSION ADD-ON: CPT

## 2024-03-01 PROCEDURE — 99213 OFFICE O/P EST LOW 20 MIN: CPT | Mod: 25

## 2024-03-01 PROCEDURE — 36415 COLL VENOUS BLD VENIPUNCTURE: CPT

## 2024-03-01 PROCEDURE — 96413 CHEMO IV INFUSION 1 HR: CPT

## 2024-03-01 RX ADMIN — SODIUM CHLORIDE 10 MILLILITER(S): 9 INJECTION INTRAMUSCULAR; INTRAVENOUS; SUBCUTANEOUS at 16:30

## 2024-03-01 RX ADMIN — NIVOLUMAB 480 MILLIGRAM(S): 10 INJECTION INTRAVENOUS at 15:33

## 2024-03-01 RX ADMIN — NIVOLUMAB 480 MILLIGRAM(S): 10 INJECTION INTRAVENOUS at 16:20

## 2024-03-04 ENCOUNTER — NON-APPOINTMENT (OUTPATIENT)
Age: 69
End: 2024-03-04

## 2024-03-04 VITALS
WEIGHT: 159 LBS | RESPIRATION RATE: 18 BRPM | SYSTOLIC BLOOD PRESSURE: 120 MMHG | DIASTOLIC BLOOD PRESSURE: 90 MMHG | TEMPERATURE: 97.3 F | BODY MASS INDEX: 28.17 KG/M2 | HEART RATE: 70 BPM | OXYGEN SATURATION: 98 % | HEIGHT: 63 IN

## 2024-03-04 LAB — TSH SERPL-ACNC: 3.54 UIU/ML

## 2024-03-04 RX ORDER — NIRMATRELVIR AND RITONAVIR 300-100 MG
20 X 150 MG & KIT ORAL TWICE DAILY
Qty: 30 | Refills: 0 | Status: COMPLETED | COMMUNITY
Start: 2024-02-11 | End: 2024-03-04

## 2024-03-04 NOTE — ASSESSMENT
[FreeTextEntry1] : Sandra Alston is a 69 year old female who presented with gross hematuria and urinary retention. CT urogram showed a 4 cm tumor in the bladder and extending into the mid urethra. No lymphadenopathy or distant metastatic disease on CT c/a/p. 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 Completed neoadjuvant chemotherapy with gemcitabine and cisplatin 4/27/23- 7/6/23.  S/p robotic anterior exenteration, pelvic LN dissection with ileal conduit formation on 9/5/23 (Dr Keller). Pathology showed residual high grade urothelial carcinoma with glandular and mucinous features, infiltrates perivesical adipose tissue and urethra.  Urethral margin positive for invasive carcinoma.  0/10 LN involved.  [id number] Initiated adjuvant immunotherapy with Nivolumab on 10/23/23 RT to bladder started on 1/30/24, undergoing RT   Plan: 1. 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  She had extensive residual disease after neoadjuvant chemotherapy and a positive surgical margin.  High risk for recurrence/metastasis --recommended adjuvant treatment w/ nivolumab x 1 year. Initiated nivolumab on 10/23/23, tolerating treatment well without any dose-limiting checkpoint inhibitor toxicities. Nivolumab dose switched to the q-4week schedule on 1/5/24. No any issues with 4 week dose of Nivo. Labs reviewed and ok to proceed with Nivolumab 480mg today. --saw Dr. Lares on 12/11/23 given positive margin for consideration for RT.  He requested pelvic MRI.  discussed MRI results w/ the patient.  No definitive evidence for recurrence reported by radiology from the MRI, but there was non-mass enhancement indeterminate for residual disease versus post-op changes.  Will continue immunotherapy through the course of RT treatment as long as she is tolerating.  RT to bladder started on 1/30/24, until 3/26/24 --CT C/A on 1/17/24 showed no evidence of abdominal or thoracic metastasis. --TSH every 8 weeks --continue Nivolumab every 4 weeks and RT until late March as planned    2.MGUS - history noted, follows with hematologist on [place]. defer further w/u for now.    3..h/o Iron deficiency --on PO iron daily Ferritin/Iron/TIBC WNL on 10/20/23.  Hgb 11,stable   4. health maintenance --no need for pap smear given h/o radical hysterectomy on 9/5/23 --last mammogram in March 2022, should proceed w/ mammogram this year if she remains cancer-free.  5. h/o covid -19 infection --occurred on 2/11, completed paxlovid  --recovered well from the infection.    RTC with nivolumab 480mg on 3/28/24 labs- CBC, CMP

## 2024-03-04 NOTE — PHYSICAL EXAM
[Restricted in physically strenuous activity but ambulatory and able to carry out work of a light or sedentary nature] : Status 1- Restricted in physically strenuous activity but ambulatory and able to carry out work of a light or sedentary nature, e.g., light house work, office work [Normal] : affect appropriate [de-identified] : chemoport in place, no sign of infection, mild pedal edema. [de-identified] : +ileal conduit  [de-identified] : not distended, soft,  no tenderness

## 2024-03-04 NOTE — HISTORY OF PRESENT ILLNESS
[de-identified] : Sandra Alston is a 69 year old  referred by Dr. Moulton for evaluation for urothelial carcinoma involving bladder and urethra. Here today for f/u and tx.   Oncologic history: 1.  urothelial carcinoma involving bladder and urethra --presented with hematuria and urinary retention --CT scan urogram 3/8/23 showed a 4.5 cm thick walled, rim enhancing mass with necrosis at the level of the urethra extending close to the L ureteral orifice.  No lymphadenopathy.  no visceral metastatic disease --cystoscopy 3/9/23 showed a flat solid appearing tumor at the L trigone and Lateral wall of the bladder extending into the urethra --TURBT 3/22/23:  biopsies from L bladder neck, L trigone, anterior bladder neck, and bladder neck all showing high grade, muscle invasive urothelial carcinoma. --chest CT 4/14/23 negative for thoracic metastatic disease --neoadjuvant chemotherapy with cisplatin and gemcitabine x 4 cycles 4/27/23 - 7/6/23 --CT c/a/p 7/2023 after completing NACT showed partial response, no interval development of metastatic disease --s/p robotic anterior exenteration, PLND with ileal conduit formation 9/5/23 (Dr Keller). Pathology: pT3a, high grade urothelial carcinoma with glandular and mucinous features, infiltrates perivesical adipose tissue and urethra.  Urethral margin positive for invasive carcinoma.  0/10 LN involved.  [id number] --initiated adjuvant immunotherapy with Nivolumab on 10/23/23 --CT CA on 1/17/24 showed no evidence of abdominal or thoracic metastasis, stable mild bilateral pelviectasis.  PMH also notable for MGUS - no records - follows with Dr Brown on [place].  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 , lives on [place] [de-identified] : Here for f/u with nivolumab.  She started RT to bladder on 1/30/24.  She had covid-19 infection on 2/11 and completed paxlovid at that time. Recent home test was negative. She recovered well from the covid and now feeling fine with mild- moderate fatigue, manageable. + occasional pinkish vaginal discharge. no pain. weight stable.  bowel movement is good now.  No new symptoms including cough, rash, diarrhea, fever, chest pain or SOB.

## 2024-03-05 NOTE — HISTORY OF PRESENT ILLNESS
[FreeTextEntry1] : Sandra Willis is a 68-year-old female with PMH HTN, MGUS Family history of bone Ca (father) being followed by Dr. Keller who had presented with gross hematuria and urinary retention in 4/2023 and underwent CT scan urogram that revealed a 4.4cm thick walled, rim enhancing mass with necrosis at the level of the urethra extending close to the left ureteral orifice. No lymphadenopathy, no visceral metastatic disease  March 9, 2023, cystoscopy showed a flat solid appearing tumor at the L trigone and Lateral wall of the bladder extending into the urethra. On 3/22/2023 underwent TURBT- biopsies from L bladder neck, L trigone, anterior bladder neck and bladder neck all showing high grade, muscle invasive urothelial carcinoma. 4/2023 CT C/A/P negative for thoracic metastatic disease.  Ms. Alston received neoadjuvant chemotherapy with cisplatin and gemcitabine X4 cycles (4/27/2023- 7/6/2023) July 2023 CT C/A/P after completing NACT showed partial response, no interval development of metastatic disease. On 9/5/2023 pt. underwent Robotic anterior exenteration, PLND with ileal conduit formation with Dr. Keller. Pathology pT3a, high grade urothelial carcinoma with glandular and mucinous features, infiltrates tatiana vesical adipose tissue and urethra. Urethral margin positive for invasive high-grade urothelial carcinoma. 0/10 LN involved. [id number], stage IIIA On 10/23/2023, initiated adjuvant immunotherapy with Nivolumab 240mg Q 2 weeks  OTV APPT:  3/4/2024:  has completed 14 /14 Fx  3/22 Fc to her bladder. Overall she id ddoing well. Denies any pain or hematuria. Energy level is good as well as appetite. She will continue current RT tr2/21/2024-OTV- Mr. Alston has completed 9/14 Fx on her bladder. Overall, she is doing well. Slight hematuria. Appetite is good and energy level is good. Just recovered from Covid virus. Her skin is intact around the vagina area. Denies any irritant to the skin and no skin pigmentating. She will continue her current planned RT treatments.  2/5/2024-OTV- Mrs. Alston has received 4/14 Fx or  / 3960 cGy radiation to the bladder .Overall doing well.

## 2024-03-05 NOTE — REVIEW OF SYSTEMS
[FreeTextEntry8] : ileal conduit in place, radical cystectomy performed [FreeTextEntry9] : on immunotherapy [FreeTextEntry2] : urostomy

## 2024-03-05 NOTE — DISEASE MANAGEMENT
[TTNM] : 3a [NTNM] : 0 [MTNM] : 0 [de-identified] : 3Fx [de-identified] : 22Fx [de-identified] : Bladder

## 2024-03-11 ENCOUNTER — NON-APPOINTMENT (OUTPATIENT)
Age: 69
End: 2024-03-11

## 2024-03-12 NOTE — PHYSICAL EXAM
[General Appearance - Well Developed] : well developed [General Appearance - Well Nourished] : well nourished [Extraocular Movements] : extraocular movements were intact [Sclera] : the sclera and conjunctiva were normal [Outer Ear] : the ears and nose were normal in appearance [Hearing Threshold Finger Rub Not [place]] : hearing was normal [Exaggerated Use Of Accessory Muscles For Inspiration] : no accessory muscle use [Nondistended] : nondistended [Range of Motion to Joints] : range of motion to joints [Motor Tone] : muscle strength and tone were normal [] : no rash [Skin Color & Pigmentation] : normal skin color and pigmentation [No Focal Deficits] : no focal deficits [Normal] : oriented to person, place and time, the affect was normal, the mood was normal and not anxious [Oriented To Time, Place, And Person] : oriented to person, place, and time [de-identified] : ileal conduit, clean and dry

## 2024-03-12 NOTE — DISEASE MANAGEMENT
[Pathological] : TNM Stage: p [IIIA] : IIIA [TTNM] : 3a [NTNM] : 0 [MTNM] : 0 [de-identified] : 1140qGy [de-identified] : [id number] [de-identified] : Bladder

## 2024-03-12 NOTE — REVIEW OF SYSTEMS
[Joint Pain] : joint pain [Negative] : Heme/Lymph [Anal Pain: Grade 0] : Anal Pain: Grade 0 [Constipation: Grade 0] : Constipation: Grade 0 [Diarrhea: Grade 0] : Diarrhea: Grade 0 [Dyspepsia: Grade 0] : Dyspepsia: Grade 0 [Dysphagia: Grade 0] : Dysphagia: Grade 0 [Esophagitis: Grade 0] : Esophagitis: Grade 0 [Gastroparesis: Grade 0] : Gastroparesis: Grade 0 [Fecal Incontinence: Grade 0] : Fecal Incontinence: Grade 0 [Nausea: Grade 0] : Nausea: Grade 0 [Rectal Pain: Grade 0] : Rectal Pain: Grade 0 [Proctitis: Grade 0] : Proctitis: Grade 0 [Small Intestinal Obstruction: Grade 0] : Small Intestinal Obstruction: Grade 0 [Vomiting: Grade 0] : Vomiting: Grade 0 [Hematuria: Grade 1 - Asymptomatic; clinical or diagnostic observations only; intervention not indicated] : Hematuria: Grade 1 - Asymptomatic; clinical or diagnostic observations only; intervention not indicated [Urinary Tract Pain: Grade 0] : Urinary Tract Pain: Grade 0 [Urinary Urgency: Grade 0] : Urinary Urgency: Grade 0 [Urinary Frequency: Grade 0] : Urinary Frequency: Grade 0 [Cognitive Disturbance: Grade 0] : Cognitive Disturbance: Grade 0 [Anxiety: Grade 0] : Anxiety: Grade 0  [Skin Atrophy: Grade 0] : Skin Atrophy: Grade 0 [Skin Hyperpigmentation: Grade 0] : Skin Hyperpigmentation: Grade 0 [Dermatitis Radiation: Grade 0] : Dermatitis Radiation: Grade 0 [Skin Induration: Grade 0] : Skin Induration: Grade 0 [FreeTextEntry8] : ileal conduit in place, radical cystectomy performed [FreeTextEntry9] : on immunotherapy [FreeTextEntry2] : urostomy

## 2024-03-12 NOTE — HISTORY OF PRESENT ILLNESS
[FreeTextEntry1] : Sandra Willis is a 68-year-old female with PMH HTN, MGUS Family history of bone Ca (father) being followed by Dr. Keller who had presented with gross hematuria and urinary retention in 4/2023 and underwent CT scan urogram that revealed a 4.4cm thick walled, rim enhancing mass with necrosis at the level of the urethra extending close to the left ureteral orifice. No lymphadenopathy, no visceral metastatic disease  March 9, 2023, cystoscopy showed a flat solid appearing tumor at the L trigone and Lateral wall of the bladder extending into the urethra. On 3/22/2023 underwent TURBT- biopsies from L bladder neck, L trigone, anterior bladder neck and bladder neck all showing high grade, muscle invasive urothelial carcinoma. 4/2023 CT C/A/P negative for thoracic metastatic disease.  Ms. Alston received neoadjuvant chemotherapy with cisplatin and gemcitabine X4 cycles (4/27/2023- 7/6/2023) July 2023 CT C/A/P after completing NACT showed partial response, no interval development of metastatic disease. On 9/5/2023 pt. underwent Robotic anterior exenteration, PLND with ileal conduit formation with Dr. Keller. Pathology pT3a, high grade urothelial carcinoma with glandular and mucinous features, infiltrates tatiana vesical adipose tissue and urethra. Urethral margin positive for invasive high-grade urothelial carcinoma. 0/10 LN involved. [id number], stage IIIA On 10/23/2023, initiated adjuvant immunotherapy with Nivolumab 240mg Q 2 weeks  OTV APPT:  3/11/2024-OTV- Pt has completed 8/22 Fx or 1440/3960cGy radiation to the baldder. Overall she is doing well. Appetite and energy level is good. Denies hematuria or pain. Skin intact no discoloration noted. Continue current planned RT traeatments.  3/4/2024:  has completed 14 /14 Fx  3/22 Fc to her bladder. Overall she id ddoing well. Denies any pain or hematuria. Energy level is good as well as appetite. She will continue current RT tr2/21/2024-OTV- Mr. Alston has completed 9/14 Fx on her bladder. Overall, she is doing well. Slight hematuria. Appetite is good and energy level is good. Just recovered from Covid virus. Her skin is intact around the vagina area. Denies any irritant to the skin and no skin pigmentating. She will continue her current planned RT treatments.  2/5/2024-OTV- Mrs. Alston has received 4/14 Fx or  / 3960 cGy radiation to the bladder .Overall doing well.

## 2024-03-18 ENCOUNTER — NON-APPOINTMENT (OUTPATIENT)
Age: 69
End: 2024-03-18

## 2024-03-18 VITALS
RESPIRATION RATE: 20 BRPM | HEART RATE: 68 BPM | SYSTOLIC BLOOD PRESSURE: 142 MMHG | OXYGEN SATURATION: 100 % | TEMPERATURE: 97.6 F | DIASTOLIC BLOOD PRESSURE: 83 MMHG

## 2024-03-18 NOTE — DISEASE MANAGEMENT
[Pathological] : TNM Stage: p [IIIA] : IIIA [TTNM] : 3a [NTNM] : 0 [MTNM] : 0 [de-identified] : [id number] [de-identified] : [id number] [de-identified] : Bladder

## 2024-03-18 NOTE — REVIEW OF SYSTEMS
[Joint Pain] : joint pain [Negative] : Heme/Lymph [Anal Pain: Grade 0] : Anal Pain: Grade 0 [Constipation: Grade 0] : Constipation: Grade 0 [Diarrhea: Grade 0] : Diarrhea: Grade 0 [Dyspepsia: Grade 0] : Dyspepsia: Grade 0 [Dysphagia: Grade 0] : Dysphagia: Grade 0 [Fecal Incontinence: Grade 0] : Fecal Incontinence: Grade 0 [Esophagitis: Grade 0] : Esophagitis: Grade 0 [Gastroparesis: Grade 0] : Gastroparesis: Grade 0 [Nausea: Grade 0] : Nausea: Grade 0 [Proctitis: Grade 0] : Proctitis: Grade 0 [Rectal Pain: Grade 0] : Rectal Pain: Grade 0 [Small Intestinal Obstruction: Grade 0] : Small Intestinal Obstruction: Grade 0 [Vomiting: Grade 0] : Vomiting: Grade 0 [Urinary Tract Pain: Grade 0] : Urinary Tract Pain: Grade 0 [Hematuria: Grade 1 - Asymptomatic; clinical or diagnostic observations only; intervention not indicated] : Hematuria: Grade 1 - Asymptomatic; clinical or diagnostic observations only; intervention not indicated [Urinary Urgency: Grade 0] : Urinary Urgency: Grade 0 [Urinary Frequency: Grade 0] : Urinary Frequency: Grade 0 [Cognitive Disturbance: Grade 0] : Cognitive Disturbance: Grade 0 [Anxiety: Grade 0] : Anxiety: Grade 0  [Skin Atrophy: Grade 0] : Skin Atrophy: Grade 0 [Skin Hyperpigmentation: Grade 0] : Skin Hyperpigmentation: Grade 0 [Skin Induration: Grade 0] : Skin Induration: Grade 0 [Dermatitis Radiation: Grade 0] : Dermatitis Radiation: Grade 0 [FreeTextEntry8] : ileal conduit in place, radical cystectomy performed [FreeTextEntry9] : on immunotherapy [FreeTextEntry2] : urostomy

## 2024-03-18 NOTE — PHYSICAL EXAM
[General Appearance - Well Developed] : well developed [General Appearance - Well Nourished] : well nourished [Sclera] : the sclera and conjunctiva were normal [Extraocular Movements] : extraocular movements were intact [Outer Ear] : the ears and nose were normal in appearance [Hearing Threshold Finger Rub Not [place]] : hearing was normal [Exaggerated Use Of Accessory Muscles For Inspiration] : no accessory muscle use [Nondistended] : nondistended [Range of Motion to Joints] : range of motion to joints [Motor Tone] : muscle strength and tone were normal [Skin Color & Pigmentation] : normal skin color and pigmentation [] : no rash [Normal] : oriented to person, place and time, the affect was normal, the mood was normal and not anxious [No Focal Deficits] : no focal deficits [Oriented To Time, Place, And Person] : oriented to person, place, and time [de-identified] : ileal conduit, clean and dry

## 2024-03-18 NOTE — HISTORY OF PRESENT ILLNESS
[FreeTextEntry1] : Sandra Willis is a 68-year-old female with PMH HTN, MGUS Family history of bone Ca (father) being followed by Dr. Keller who had presented with gross hematuria and urinary retention in 4/2023 and underwent CT scan urogram that revealed a 4.4cm thick walled, rim enhancing mass with necrosis at the level of the urethra extending close to the left ureteral orifice. No lymphadenopathy, no visceral metastatic disease  March 9, 2023, cystoscopy showed a flat solid appearing tumor at the L trigone and Lateral wall of the bladder extending into the urethra. On 3/22/2023 underwent TURBT- biopsies from L bladder neck, L trigone, anterior bladder neck and bladder neck all showing high grade, muscle invasive urothelial carcinoma. 4/2023 CT C/A/P negative for thoracic metastatic disease.  Ms. Alston received neoadjuvant chemotherapy with cisplatin and gemcitabine X4 cycles (4/27/2023- 7/6/2023) July 2023 CT C/A/P after completing NACT showed partial response, no interval development of metastatic disease. On 9/5/2023 pt. underwent Robotic anterior exenteration, PLND with ileal conduit formation with Dr. Keller. Pathology pT3a, high grade urothelial carcinoma with glandular and mucinous features, infiltrates tatiana vesical adipose tissue and urethra. Urethral margin positive for invasive high-grade urothelial carcinoma. 0/10 LN involved. [id number], stage IIIA On 10/23/2023, initiated adjuvant immunotherapy with Nivolumab 240mg Q 2 weeks  OTV APPT:   3/18/2024-OT - Ms. Alston has completed 14/14 Fx and   13/22 Fx  or 2340/ 3960 cGy radiation to the bladder. Overall she is doing well.  She denies hematuria Skin is warm and not discolored  She is experiencing a bout of loose stool in which she take Metamucil for this and it helps. She has so,e lower abdoinal discomfort but it resolves on its own. She will continue current RT treatments as planned  3/11/2024-OTV- Pt has completed 8/22 Fx or 1440/3960cGy radiation to the bladder. Overall she is doing well. Appetite and energy level is good. Denies hematuria or pain. Skin intact no discoloration noted. Continue current planned RT traeatments.  3/4/2024:  has completed 14 /14 Fx  3/22 Fc to her bladder. Overall she id ddoing well. Denies any pain or hematuria. Energy level is good as well as appetite. She will continue current RT tr2/21/2024-OTV- Mr. Alston has completed 9/14 Fx on her bladder. Overall, she is doing well. Slight hematuria. Appetite is good and energy level is good. Just recovered from Covid virus. Her skin is intact around the vagina area. Denies any irritant to the skin and no skin pigmentating. She will continue her current planned RT treatments.  2/5/2024-OTV- Mrs. Alston has received 4/14 Fx or  / 3960 cGy radiation to the bladder .Overall doing well.

## 2024-03-24 ENCOUNTER — NON-APPOINTMENT (OUTPATIENT)
Age: 69
End: 2024-03-24

## 2024-03-25 VITALS — HEART RATE: 68 BPM | TEMPERATURE: 97.5 F | SYSTOLIC BLOOD PRESSURE: 156 MMHG | DIASTOLIC BLOOD PRESSURE: 89 MMHG

## 2024-03-25 NOTE — HISTORY OF PRESENT ILLNESS
[FreeTextEntry1] : Sandra Willis is a 68-year-old female with PMH HTN, MGUS Family history of bone Ca (father) being followed by Dr. Keller who had presented with gross hematuria and urinary retention in 4/2023 and underwent CT scan urogram that revealed a 4.4cm thick walled, rim enhancing mass with necrosis at the level of the urethra extending close to the left ureteral orifice. No lymphadenopathy, no visceral metastatic disease  March 9, 2023, cystoscopy showed a flat solid appearing tumor at the L trigone and Lateral wall of the bladder extending into the urethra. On 3/22/2023 underwent TURBT- biopsies from L bladder neck, L trigone, anterior bladder neck and bladder neck all showing high grade, muscle invasive urothelial carcinoma. 4/2023 CT C/A/P negative for thoracic metastatic disease.  Ms. Alston received neoadjuvant chemotherapy with cisplatin and gemcitabine X4 cycles (4/27/2023- 7/6/2023) July 2023 CT C/A/P after completing NACT showed partial response, no interval development of metastatic disease. On 9/5/2023 pt. underwent Robotic anterior exenteration, PLND with ileal conduit formation with Dr. Keller. Pathology pT3a, high grade urothelial carcinoma with glandular and mucinous features, infiltrates tatiana vesical adipose tissue and urethra. Urethral margin positive for invasive high-grade urothelial carcinoma. 0/10 LN involved. [id number], stage IIIA On 10/23/2023, initiated adjuvant immunotherapy with Nivolumab 240mg Q 2 weeks  OTV APPT:  3/25/2024-OTV- Ms. Alston has completed   14/14 Fx and   18/22Fx radiaiton to the bladder.. [place] conduit is functioning well without any complaints as per pt./ She is experiencing vaginal irritation and itchiness   Advised to apply aquaphor   No ope skin areas.  PTE packet given to pt and reviewed  3/18/2024-OT - Ms. Alston has completed 14/14 Fx and   13/22 Fx  or 2340/ 3960 cGy radiation to the bladder. Overall she is doing well.  She denies hematuria Skin is warm and not discolored  She is experiencing a bout of loose stool in which she take Metamucil for this and it helps. She has so,e lower abdoinal discomfort but it resolves on its own. She will continue current RT treatments as planned  3/11/2024-OTV- Pt has completed 8/22 Fx or 1440/3960cGy radiation to the bladder. Overall she is doing well. Appetite and energy level is good. Denies hematuria or pain. Skin intact no discoloration noted. Continue current planned RT traeatments.  3/4/2024:  has completed 14 /14 Fx  3/22 Fc to her bladder. Overall she id ddoing well. Denies any pain or hematuria. Energy level is good as well as appetite. She will continue current RT tr2/21/2024-OTV- Mr. Alston has completed 9/14 Fx on her bladder. Overall, she is doing well. Slight hematuria. Appetite is good and energy level is good. Just recovered from Covid virus. Her skin is intact around the vagina area. Denies any irritant to the skin and no skin pigmentating. She will continue her current planned RT treatments.  2/5/2024-OTV- Mrs. Alston has received 4/14 Fx or  / 3960 cGy radiation to the bladder .Overall doing well.

## 2024-03-25 NOTE — DISEASE MANAGEMENT
[Pathological] : TNM Stage: p [IIIA] : IIIA [TTNM] : 3a [NTNM] : 0 [MTNM] : 0 [de-identified] : cGy [de-identified] : [id number] [de-identified] : Bladder

## 2024-03-25 NOTE — REVIEW OF SYSTEMS
[Joint Pain] : joint pain [Negative] : Heme/Lymph [Anal Pain: Grade 0] : Anal Pain: Grade 0 [Constipation: Grade 0] : Constipation: Grade 0 [Dyspepsia: Grade 0] : Dyspepsia: Grade 0 [Diarrhea: Grade 0] : Diarrhea: Grade 0 [Dysphagia: Grade 0] : Dysphagia: Grade 0 [Esophagitis: Grade 0] : Esophagitis: Grade 0 [Fecal Incontinence: Grade 0] : Fecal Incontinence: Grade 0 [Gastroparesis: Grade 0] : Gastroparesis: Grade 0 [Nausea: Grade 0] : Nausea: Grade 0 [Proctitis: Grade 0] : Proctitis: Grade 0 [Rectal Pain: Grade 0] : Rectal Pain: Grade 0 [Small Intestinal Obstruction: Grade 0] : Small Intestinal Obstruction: Grade 0 [Vomiting: Grade 0] : Vomiting: Grade 0 [Hematuria: Grade 1 - Asymptomatic; clinical or diagnostic observations only; intervention not indicated] : Hematuria: Grade 1 - Asymptomatic; clinical or diagnostic observations only; intervention not indicated [Urinary Tract Pain: Grade 0] : Urinary Tract Pain: Grade 0 [Urinary Urgency: Grade 0] : Urinary Urgency: Grade 0 [Urinary Frequency: Grade 0] : Urinary Frequency: Grade 0 [Anxiety: Grade 0] : Anxiety: Grade 0  [Cognitive Disturbance: Grade 0] : Cognitive Disturbance: Grade 0 [Skin Hyperpigmentation: Grade 0] : Skin Hyperpigmentation: Grade 0 [Skin Atrophy: Grade 0] : Skin Atrophy: Grade 0 [Dermatitis Radiation: Grade 0] : Dermatitis Radiation: Grade 0 [Skin Induration: Grade 0] : Skin Induration: Grade 0 [FreeTextEntry9] : on immunotherapy [FreeTextEntry8] : ileal conduit in place, radical cystectomy performed [FreeTextEntry2] : urostomy

## 2024-03-27 ENCOUNTER — OUTPATIENT (OUTPATIENT)
Dept: OUTPATIENT SERVICES | Facility: HOSPITAL | Age: 69
LOS: 1 days | End: 2024-03-27
Payer: MEDICARE

## 2024-03-27 ENCOUNTER — APPOINTMENT (OUTPATIENT)
Dept: HEMATOLOGY ONCOLOGY | Facility: CLINIC | Age: 69
End: 2024-03-27
Payer: MEDICARE

## 2024-03-27 ENCOUNTER — LABORATORY RESULT (OUTPATIENT)
Age: 69
End: 2024-03-27

## 2024-03-27 ENCOUNTER — APPOINTMENT (OUTPATIENT)
Dept: INFUSION THERAPY | Facility: CLINIC | Age: 69
End: 2024-03-27

## 2024-03-27 VITALS
HEART RATE: 68 BPM | RESPIRATION RATE: 17 BRPM | HEIGHT: 72 IN | DIASTOLIC BLOOD PRESSURE: 79 MMHG | TEMPERATURE: 98 F | WEIGHT: 158.95 LBS | OXYGEN SATURATION: 96 % | SYSTOLIC BLOOD PRESSURE: 130 MMHG

## 2024-03-27 VITALS
TEMPERATURE: 98.1 F | HEART RATE: 68 BPM | BODY MASS INDEX: 28.17 KG/M2 | SYSTOLIC BLOOD PRESSURE: 130 MMHG | WEIGHT: 159 LBS | OXYGEN SATURATION: 96 % | DIASTOLIC BLOOD PRESSURE: 74 MMHG | HEIGHT: 63 IN | RESPIRATION RATE: 18 BRPM

## 2024-03-27 VITALS
OXYGEN SATURATION: 98 % | HEART RATE: 58 BPM | RESPIRATION RATE: 17 BRPM | DIASTOLIC BLOOD PRESSURE: 76 MMHG | SYSTOLIC BLOOD PRESSURE: 124 MMHG | TEMPERATURE: 98 F

## 2024-03-27 DIAGNOSIS — Z90.79 ACQUIRED ABSENCE OF OTHER GENITAL ORGAN(S): Chronic | ICD-10-CM

## 2024-03-27 DIAGNOSIS — N75.0 CYST OF BARTHOLIN'S GLAND: Chronic | ICD-10-CM

## 2024-03-27 DIAGNOSIS — C68.0 MALIGNANT NEOPLASM OF URETHRA: ICD-10-CM

## 2024-03-27 DIAGNOSIS — Z98.890 OTHER SPECIFIED POSTPROCEDURAL STATES: Chronic | ICD-10-CM

## 2024-03-27 LAB
ALBUMIN SERPL ELPH-MCNC: 3.5 G/DL
ALP BLD-CCNC: 71 U/L
ALT SERPL-CCNC: 20 U/L
ANION GAP SERPL CALC-SCNC: 2 MMOL/L
AST SERPL-CCNC: 24 U/L
BILIRUB SERPL-MCNC: 0.7 MG/DL
BUN SERPL-MCNC: 11 MG/DL
CALCIUM SERPL-MCNC: 9.6 MG/DL
CHLORIDE SERPL-SCNC: 106 MMOL/L
CO2 SERPL-SCNC: 30 MMOL/L
CREAT SERPL-MCNC: 0.6 MG/DL
EGFR: 96 ML/MIN/1.73M2
GLUCOSE SERPL-MCNC: 122 MG/DL
POTASSIUM SERPL-SCNC: 4 MMOL/L
PROT SERPL-MCNC: 6.9 G/DL
SODIUM SERPL-SCNC: 138 MMOL/L

## 2024-03-27 PROCEDURE — 96361 HYDRATE IV INFUSION ADD-ON: CPT

## 2024-03-27 PROCEDURE — 99213 OFFICE O/P EST LOW 20 MIN: CPT | Mod: 25

## 2024-03-27 PROCEDURE — 96413 CHEMO IV INFUSION 1 HR: CPT

## 2024-03-27 PROCEDURE — 36415 COLL VENOUS BLD VENIPUNCTURE: CPT

## 2024-03-27 RX ORDER — NIVOLUMAB 10 MG/ML
480 INJECTION INTRAVENOUS ONCE
Refills: 0 | Status: COMPLETED | OUTPATIENT
Start: 2024-03-27 | End: 2024-03-27

## 2024-03-27 RX ORDER — SODIUM CHLORIDE 9 MG/ML
10 INJECTION INTRAMUSCULAR; INTRAVENOUS; SUBCUTANEOUS ONCE
Refills: 0 | Status: COMPLETED | OUTPATIENT
Start: 2024-03-27 | End: 2024-03-27

## 2024-03-27 RX ADMIN — NIVOLUMAB 480 MILLIGRAM(S): 10 INJECTION INTRAVENOUS at 12:20

## 2024-03-27 RX ADMIN — SODIUM CHLORIDE 10 MILLILITER(S): 9 INJECTION INTRAMUSCULAR; INTRAVENOUS; SUBCUTANEOUS at 13:00

## 2024-03-27 RX ADMIN — NIVOLUMAB 480 MILLIGRAM(S): 10 INJECTION INTRAVENOUS at 12:50

## 2024-03-30 NOTE — HISTORY OF PRESENT ILLNESS
[de-identified] : Sandra Alston is a 69 year old  referred by Dr. Moulton for evaluation for urothelial carcinoma involving bladder and urethra. Here today for f/u and tx.   Oncologic history: 1.  urothelial carcinoma involving bladder and urethra --presented with hematuria and urinary retention --CT scan urogram 3/8/23 showed a 4.5 cm thick walled, rim enhancing mass with necrosis at the level of the urethra extending close to the L ureteral orifice.  No lymphadenopathy.  no visceral metastatic disease --cystoscopy 3/9/23 showed a flat solid appearing tumor at the L trigone and Lateral wall of the bladder extending into the urethra --TURBT 3/22/23:  biopsies from L bladder neck, L trigone, anterior bladder neck, and bladder neck all showing high grade, muscle invasive urothelial carcinoma. --chest CT 4/14/23 negative for thoracic metastatic disease --neoadjuvant chemotherapy with cisplatin and gemcitabine x 4 cycles 4/27/23 - 7/6/23 --CT c/a/p 7/2023 after completing NACT showed partial response, no interval development of metastatic disease --s/p robotic anterior exenteration, PLND with ileal conduit formation 9/5/23 (Dr Keller). Pathology: pT3a, high grade urothelial carcinoma with glandular and mucinous features, infiltrates perivesical adipose tissue and urethra.  Urethral margin positive for invasive carcinoma.  0/10 LN involved.  [id number] --initiated adjuvant immunotherapy with Nivolumab on 10/23/23 --CT CA on 1/17/24 showed no evidence of abdominal or thoracic metastasis, stable mild bilateral pelviectasis. --She started RT to bladder on 1/30/24 and plan to complete on 3/29/24  PMH also notable for MGUS - no records - follows with Dr Brown on [place].  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 , lives on [place] [de-identified] : Here for f/u with Nivolumab.  She started RT to bladder on 1/30/24 and last day will be on 3/29/24 + occasional constipation, fatigue, manageable and trying to be active.  + vaginal itching, eating is good, weight stable  no new symptoms including cough, rash, diarrhea, fever, chest pain or SOB. she is overall feeling good.

## 2024-03-30 NOTE — PHYSICAL EXAM
[Restricted in physically strenuous activity but ambulatory and able to carry out work of a light or sedentary nature] : Status 1- Restricted in physically strenuous activity but ambulatory and able to carry out work of a light or sedentary nature, e.g., light house work, office work [Normal] : affect appropriate [de-identified] : not distended, soft,  non tender [de-identified] : +ileal conduit  [de-identified] : chemoport in place, no sign of infection,

## 2024-03-30 NOTE — ASSESSMENT
[FreeTextEntry1] : Sandra Alston is a 69 year old female who presented with gross hematuria and urinary retention. CT urogram showed a 4 cm tumor in the bladder and extending into the mid urethra. No lymphadenopathy or distant metastatic disease on CT c/a/p. 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 Completed neoadjuvant chemotherapy with gemcitabine and cisplatin 4/27/23- 7/6/23.  S/p robotic anterior exenteration, pelvic LN dissection with ileal conduit formation on 9/5/23 (Dr Keller). Pathology showed residual high grade urothelial carcinoma with glandular and mucinous features, infiltrates perivesical adipose tissue and urethra.  Urethral margin positive for invasive carcinoma.  0/10 LN involved.  [id number] Initiated adjuvant immunotherapy with Nivolumab on 10/23/23 RT to bladder started on 1/30/24, undergoing RT   Plan: 1. 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  She had extensive residual disease after neoadjuvant chemotherapy and a positive surgical margin.  High risk for recurrence/metastasis --recommended adjuvant treatment w/ nivolumab x 1 year (10/2024). Initiated nivolumab on 10/23/23, tolerating treatment well without any dose-limiting checkpoint inhibitor toxicities. Nivolumab dose switched to the q-4week schedule on 1/5/24. No any issues with 4 week dose of Nivo. Labs reviewed and ok to proceed with Nivolumab 480mg today. --saw Dr. Lares on 12/11/23 given positive margin for consideration for RT.  He requested pelvic MRI.  discussed MRI results w/ the patient.  No definitive evidence for recurrence reported by radiology from the MRI, but there was non-mass enhancement indeterminate for residual disease versus post-op changes. Started RT to bladder on 1/30/24, last day will be on 3/29/24 --CT C/A on 1/17/24 showed no evidence of abdominal or thoracic metastasis. --TSH every 8 weeks --continue Nivolumab every 4 weeks  2. MGUS --history noted, followed with hematologist on [place]. But she wants to follow up with us.  --will repeat immunoelectrophoresis serum next time  3..h/o Iron deficiency --on PO iron daily Ferritin/Iron/TIBC WNL on 10/20/23.  Hgb 11,stable   4. health maintenance --no need for pap smear given h/o radical hysterectomy on 9/5/23 --last mammogram in March 2022, should proceed w/ mammogram this year if she remains cancer-free.  5. vaginal itching 2/2 RT --defer further management to Dr. Lares  RTC with nivolumab 480mg on 4/26/24 labs- CBC, CMP, TSH/FT4, immunoelectrophoresis serum

## 2024-04-23 RX ORDER — SODIUM CHLORIDE 9 MG/ML
10 INJECTION INTRAMUSCULAR; INTRAVENOUS; SUBCUTANEOUS ONCE
Refills: 0 | Status: COMPLETED | OUTPATIENT
Start: 2024-04-26 | End: 2024-04-26

## 2024-04-26 ENCOUNTER — APPOINTMENT (OUTPATIENT)
Dept: HEMATOLOGY ONCOLOGY | Facility: CLINIC | Age: 69
End: 2024-04-26
Payer: MEDICARE

## 2024-04-26 ENCOUNTER — OUTPATIENT (OUTPATIENT)
Dept: OUTPATIENT SERVICES | Facility: HOSPITAL | Age: 69
LOS: 1 days | End: 2024-04-26
Payer: MEDICARE

## 2024-04-26 ENCOUNTER — NON-APPOINTMENT (OUTPATIENT)
Age: 69
End: 2024-04-26

## 2024-04-26 ENCOUNTER — LABORATORY RESULT (OUTPATIENT)
Age: 69
End: 2024-04-26

## 2024-04-26 ENCOUNTER — APPOINTMENT (OUTPATIENT)
Dept: INFUSION THERAPY | Facility: CLINIC | Age: 69
End: 2024-04-26

## 2024-04-26 VITALS — SYSTOLIC BLOOD PRESSURE: 135 MMHG | DIASTOLIC BLOOD PRESSURE: 74 MMHG

## 2024-04-26 VITALS
BODY MASS INDEX: 28 KG/M2 | HEIGHT: 63 IN | DIASTOLIC BLOOD PRESSURE: 83 MMHG | TEMPERATURE: 98.7 F | RESPIRATION RATE: 18 BRPM | OXYGEN SATURATION: 96 % | WEIGHT: 158 LBS | SYSTOLIC BLOOD PRESSURE: 179 MMHG | HEART RATE: 60 BPM

## 2024-04-26 VITALS
HEART RATE: 66 BPM | TEMPERATURE: 98 F | DIASTOLIC BLOOD PRESSURE: 85 MMHG | SYSTOLIC BLOOD PRESSURE: 136 MMHG | OXYGEN SATURATION: 97 % | RESPIRATION RATE: 16 BRPM

## 2024-04-26 VITALS
HEIGHT: 63 IN | OXYGEN SATURATION: 96 % | HEART RATE: 60 BPM | WEIGHT: 158.07 LBS | TEMPERATURE: 98 F | DIASTOLIC BLOOD PRESSURE: 89 MMHG | SYSTOLIC BLOOD PRESSURE: 135 MMHG | RESPIRATION RATE: 18 BRPM

## 2024-04-26 DIAGNOSIS — C68.0 MALIGNANT NEOPLASM OF URETHRA: ICD-10-CM

## 2024-04-26 DIAGNOSIS — Z90.79 ACQUIRED ABSENCE OF OTHER GENITAL ORGAN(S): Chronic | ICD-10-CM

## 2024-04-26 DIAGNOSIS — D47.2 MONOCLONAL GAMMOPATHY: ICD-10-CM

## 2024-04-26 DIAGNOSIS — Z29.89 ENCOUNTER. FOR OTHER SPECIFIED PROPHYLACTIC MEASURES: ICD-10-CM

## 2024-04-26 DIAGNOSIS — N75.0 CYST OF BARTHOLIN'S GLAND: Chronic | ICD-10-CM

## 2024-04-26 DIAGNOSIS — Z98.890 OTHER SPECIFIED POSTPROCEDURAL STATES: Chronic | ICD-10-CM

## 2024-04-26 LAB
ALBUMIN SERPL ELPH-MCNC: 4.2 G/DL
ALP BLD-CCNC: 81 U/L
ALT SERPL-CCNC: 23 U/L
ANION GAP SERPL CALC-SCNC: 8 MMOL/L
AST SERPL-CCNC: 29 U/L
BILIRUB SERPL-MCNC: 0.7 MG/DL
BUN SERPL-MCNC: 12 MG/DL
CALCIUM SERPL-MCNC: 10.1 MG/DL
CHLORIDE SERPL-SCNC: 108 MMOL/L
CO2 SERPL-SCNC: 27 MMOL/L
CREAT SERPL-MCNC: 0.7 MG/DL
EGFR: 92 ML/MIN/1.73M2
GLUCOSE SERPL-MCNC: 107 MG/DL
POTASSIUM SERPL-SCNC: 4.1 MMOL/L
PROT SERPL-MCNC: 7.2 G/DL
SODIUM SERPL-SCNC: 143 MMOL/L

## 2024-04-26 PROCEDURE — 99214 OFFICE O/P EST MOD 30 MIN: CPT | Mod: 25

## 2024-04-26 PROCEDURE — 36415 COLL VENOUS BLD VENIPUNCTURE: CPT

## 2024-04-26 PROCEDURE — 96413 CHEMO IV INFUSION 1 HR: CPT

## 2024-04-26 RX ORDER — NIVOLUMAB 10 MG/ML
480 INJECTION INTRAVENOUS ONCE
Refills: 0 | Status: COMPLETED | OUTPATIENT
Start: 2024-04-26 | End: 2024-04-26

## 2024-04-26 RX ADMIN — NIVOLUMAB 480 MILLIGRAM(S): 10 INJECTION INTRAVENOUS at 13:29

## 2024-04-26 RX ADMIN — NIVOLUMAB 480 MILLIGRAM(S): 10 INJECTION INTRAVENOUS at 13:59

## 2024-04-26 RX ADMIN — SODIUM CHLORIDE 10 MILLILITER(S): 9 INJECTION INTRAMUSCULAR; INTRAVENOUS; SUBCUTANEOUS at 14:00

## 2024-04-29 PROBLEM — D47.2 MGUS (MONOCLONAL GAMMOPATHY OF UNKNOWN SIGNIFICANCE): Status: ACTIVE | Noted: 2023-04-19

## 2024-04-29 PROBLEM — Z29.89 IMMUNOTHERAPY ENCOUNTER: Status: ACTIVE | Noted: 2023-11-13

## 2024-04-29 LAB — TSH SERPL-ACNC: 4 UIU/ML

## 2024-04-29 RX ORDER — ONDANSETRON 8 MG/1
8 TABLET, ORALLY DISINTEGRATING ORAL EVERY 8 HOURS
Qty: 45 | Refills: 4 | Status: DISCONTINUED | COMMUNITY
Start: 2023-04-19 | End: 2024-04-29

## 2024-04-29 RX ORDER — MICONAZOLE NITRATE 200 MG-2 %
200 & 2 KIT VAGINAL TWICE DAILY
Qty: 1 | Refills: 0 | Status: DISCONTINUED | COMMUNITY
Start: 2024-03-28 | End: 2024-04-29

## 2024-04-29 RX ORDER — LOPERAMIDE HYDROCHLORIDE 2 MG/1
2 CAPSULE ORAL
Qty: 80 | Refills: 4 | Status: DISCONTINUED | COMMUNITY
Start: 2023-04-19 | End: 2024-04-29

## 2024-04-29 NOTE — HISTORY OF PRESENT ILLNESS
[de-identified] : Sandra Alston is a 69 year old  referred by Dr. Moulton for evaluation for urothelial carcinoma involving bladder and urethra. Here today for f/u and tx.   Oncologic history: 1.  urothelial carcinoma involving bladder and urethra --presented with hematuria and urinary retention --CT scan urogram 3/8/23 showed a 4.5 cm thick walled, rim enhancing mass with necrosis at the level of the urethra extending close to the L ureteral orifice.  No lymphadenopathy.  no visceral metastatic disease --cystoscopy 3/9/23 showed a flat solid appearing tumor at the L trigone and Lateral wall of the bladder extending into the urethra --TURBT 3/22/23:  biopsies from L bladder neck, L trigone, anterior bladder neck, and bladder neck all showing high grade, muscle invasive urothelial carcinoma. --chest CT 4/14/23 negative for thoracic metastatic disease --neoadjuvant chemotherapy with cisplatin and gemcitabine x 4 cycles 4/27/23 - 7/6/23 --CT c/a/p 7/2023 after completing NACT showed partial response, no interval development of metastatic disease --s/p robotic anterior exenteration, PLND with ileal conduit formation 9/5/23 (Dr Keller). Pathology: pT3a, high grade urothelial carcinoma with glandular and mucinous features, infiltrates perivesical adipose tissue and urethra.  Urethral margin positive for invasive carcinoma.  0/10 LN involved.  [id number] --initiated adjuvant immunotherapy with Nivolumab on 10/23/23 --CT CA on 1/17/24 showed no evidence of abdominal or thoracic metastasis, stable mild bilateral pelviectasis. --She started RT to bladder on 1/30/24 and completed on 3/29/24  PMH also notable for MGUS - no records - follows with Dr Brown on [place].  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  She completed RT to bladder on 3/29/24 + occasional constipation, now taking PO iron EOD.  + mild b/l ankle swelling w/o pain, wearing compression stockings + mild ongoing vaginal discharge, yellow w/o itching or pain.  + occasional abdominal bloating with gas, phazyme with relief.   + mild fatigue, started exercising at home  otherwise she feels fine. Denies fever, SOB, cough, skin rash/itching, diarrhea, chest pain or any new sites of pain..

## 2024-04-29 NOTE — PHYSICAL EXAM
[Restricted in physically strenuous activity but ambulatory and able to carry out work of a light or sedentary nature] : Status 1- Restricted in physically strenuous activity but ambulatory and able to carry out work of a light or sedentary nature, e.g., light house work, office work [Normal] : affect appropriate [de-identified] : chemoport in place, no sign of infection,  b/l mild pedal edema  [de-identified] : not distended, soft,  non tender [de-identified] : +ileal conduit

## 2024-04-29 NOTE — ASSESSMENT
[FreeTextEntry1] : Sandra Alston is a 69 year old female who presented with gross hematuria and urinary retention. CT urogram showed a 4 cm tumor in the bladder and extending into the mid urethra. No lymphadenopathy or distant metastatic disease on CT c/a/p. 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 Completed neoadjuvant chemotherapy with gemcitabine and cisplatin 4/27/23- 7/6/23.  S/p robotic anterior exenteration, pelvic LN dissection with ileal conduit formation on 9/5/23 (Dr Keller). Pathology showed residual high grade urothelial carcinoma with glandular and mucinous features, infiltrates perivesical adipose tissue and urethra.  Urethral margin positive for invasive carcinoma.  0/10 LN involved.  [id number] Initiated adjuvant immunotherapy with Nivolumab on 10/23/23, undergoing Nivolumab  RT to bladder started on 1/30/24 and completed on 3/29/24  Plan: 1. 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  She had extensive residual disease after neoadjuvant chemotherapy and a positive surgical margin.  High risk for recurrence/metastasis --recommended adjuvant treatment w/ nivolumab x 1 year (10/2024). Initiated nivolumab on 10/23/23, tolerating treatment well without any dose-limiting checkpoint inhibitor toxicities. Labs reviewed and ok to proceed with Nivolumab 480mg today. --started RT to bladder with Dr. Lares  on 1/30/24 and completed on 3/29/24, tolerated RT well.  --CT C/A on 1/17/24 showed no evidence of abdominal or thoracic metastasis. Defer further imaging to Dr. Moulton. Next CT C/A/P will be in June-July as ordered by Dr. Moulton.  --TSH every 8 weeks, normal today.  --continue Nivolumab every 4 weeks  2. MGUS --history noted, followed with hematologist on [place]. But she wants to follow up with us.  --repeated immunoelectrophoresis serum, pending today.   3..h/o Iron deficiency --on PO iron EOD,  Ferritin/Iron/TIBC WNL on 10/20/23.  --Hgb 12 WNL.  4. health maintenance --no need for pap smear given h/o radical hysterectomy on 9/5/23 --last mammogram in March 2022, should proceed w/ mammogram this year if she remains cancer-free. --nutrition referral for weight gain and healthy diet.  Encourage light exercise regularly.  --emotional support provided for anxiety, recommend psyonc referral if worse. She will consider in the future.   5. vaginal discharge  --vaginal itching resolved but noted yellowish discharge w/o pain.  Advised to follow up with Dr. Moulton  RTC with nivolumab 480mg on 5/24/24 labs- CBC, CMP

## 2024-05-01 ENCOUNTER — NON-APPOINTMENT (OUTPATIENT)
Age: 69
End: 2024-05-01

## 2024-05-01 LAB
ALBUMIN MFR SERPL ELPH: 61 %
ALBUMIN SERPL-MCNC: 4.3 G/DL
ALBUMIN/GLOB SERPL: 1.6 RATIO
ALPHA1 GLOB MFR SERPL ELPH: 4.3 %
ALPHA1 GLOB SERPL ELPH-MCNC: 0.3 G/DL
ALPHA2 GLOB MFR SERPL ELPH: 10.5 %
ALPHA2 GLOB SERPL ELPH-MCNC: 0.7 G/DL
B-GLOBULIN MFR SERPL ELPH: 11.2 %
B-GLOBULIN SERPL ELPH-MCNC: 0.8 G/DL
DEPRECATED KAPPA LC FREE/LAMBDA SER: 1.42 RATIO
GAMMA GLOB FLD ELPH-MCNC: 0.9 G/DL
GAMMA GLOB MFR SERPL ELPH: 13 %
IGA SER QL IEP: 94 MG/DL
IGG SER QL IEP: 956 MG/DL
IGM SER QL IEP: 105 MG/DL
INTERPRETATION SERPL IEP-IMP: NORMAL
KAPPA LC CSF-MCNC: 0.96 MG/DL
KAPPA LC SERPL-MCNC: 1.36 MG/DL
M PROTEIN MFR SERPL ELPH: NORMAL
M PROTEIN SPEC IFE-MCNC: NORMAL
MONOCLON BAND OBS SERPL: NORMAL
PROT SERPL-MCNC: 7 G/DL
PROT SERPL-MCNC: 7 G/DL

## 2024-05-08 ENCOUNTER — NON-APPOINTMENT (OUTPATIENT)
Age: 69
End: 2024-05-08

## 2024-05-08 ENCOUNTER — APPOINTMENT (OUTPATIENT)
Dept: RADIATION ONCOLOGY | Facility: CLINIC | Age: 69
End: 2024-05-08
Payer: MEDICARE

## 2024-05-08 VITALS
SYSTOLIC BLOOD PRESSURE: 152 MMHG | OXYGEN SATURATION: 100 % | BODY MASS INDEX: 28.34 KG/M2 | TEMPERATURE: 97.8 F | DIASTOLIC BLOOD PRESSURE: 83 MMHG | WEIGHT: 160 LBS | HEART RATE: 67 BPM

## 2024-05-08 PROCEDURE — 99024 POSTOP FOLLOW-UP VISIT: CPT

## 2024-05-08 RX ORDER — ONDANSETRON 8 MG/1
8 TABLET ORAL 3 TIMES DAILY
Qty: 30 | Refills: 0 | Status: ACTIVE | COMMUNITY
Start: 2024-05-08 | End: 1900-01-01

## 2024-05-08 NOTE — HISTORY OF PRESENT ILLNESS
[FreeTextEntry1] : Sandra Willis is a 69-year-old female with PMH HTN, MGUS Family history of bone Ca (father) being followed by Dr. Keller who had presented with gross hematuria and urinary retention in 4/2023 and underwent CT scan urogram that revealed a 4.4cm thick walled, rim enhancing mass with necrosis at the level of the urethra extending close to the left ureteral orifice. No lymphadenopathy, no visceral metastatic disease. She completed 6480 cGy radiation to the Pelvis/Bladder on 3/29/2024. She tolerated the radiation well without and severe side effects. KPS status remained the same.   March 9, 2023, cystoscopy showed a flat solid appearing tumor at the L trigone and Lateral wall of the bladder extending into the urethra. On 3/22/2023 underwent TURBT- biopsies from L bladder neck, L trigone, anterior bladder neck and bladder neck all showing high grade, muscle invasive urothelial carcinoma. 4/2023 CT C/A/P negative for thoracic metastatic disease.  PTE 5/8/2024: Ms. Alston received neoadjuvant chemotherapy with cisplatin and gemcitabine X4 cycles (4/27/2023- 7/6/2023) July 2023 CT C/A/P after completing NACT showed partial response, no interval development of metastatic disease. On 9/5/2023 pt. underwent Robotic anterior exenteration, PLND with ileal conduit formation with Dr. Keller. Pathology pT3a, high grade urothelial carcinoma with glandular and mucinous features, infiltrates tatiana vesical adipose tissue and urethra. Urethral margin positive for invasive high-grade urothelial carcinoma. 0/10 LN involved. [id number], stage IIIA On 10/23/2023, initiated adjuvant immunotherapy with Nivolumab 240mg Q 2 weeks  Consultation on 12/11/23 to evaluate RT therapy in which she underwent and completed on 3/29/2024 3960cGy radiation to Pelvis/Bladder and 2520cGy radiation to the Bladder CD without any complications. Ileal conduit is working well, no leakage noted.    5/8/2024Ms. Alston is here today for post treatment evaluation. Overall, she is doing well. She has a good appetite and experiences some fatigue where she will take rest periods. She has Ile conduit in which the stoma site is clean Clear yellow urine. No skin irritation noted around the stoma or vaginal area. She denies any dysuria or hematuria. Bowel movements are normal but at times she has constipation. She is taking Metamucil daily but advised to take bid if she does experience constipation. Denies any blood stool or pain.  She will continue to follow up with Hem Onc and her CT Abdomen scan in June/July. Sent over Vit B12 level secondary to her PCP advised to take supplements. Will call with results.

## 2024-05-08 NOTE — REVIEW OF SYSTEMS
[Joint Pain] : joint pain [Negative] : Allergic/Immunologic [Anal Pain: Grade 0] : Anal Pain: Grade 0 [Constipation: Grade 1 - Occasional or intermittent symptoms; occasional use of stool softeners, laxatives, dietary modification, or enema] : Constipation: Grade 1 - Occasional or intermittent symptoms; occasional use of stool softeners, laxatives, dietary modification, or enema [Diarrhea: Grade 0] : Diarrhea: Grade 0 [Dyspepsia: Grade 0] : Dyspepsia: Grade 0 [Dysphagia: Grade 0] : Dysphagia: Grade 0 [Esophagitis: Grade 0] : Esophagitis: Grade 0 [Fecal Incontinence: Grade 0] : Fecal Incontinence: Grade 0 [Gastroparesis: Grade 0] : Gastroparesis: Grade 0 [Nausea: Grade 1 - Loss of appetite without alteration in eating habits] : Nausea: Grade 1 - Loss of appetite without alteration in eating habits [Proctitis: Grade 0] : Proctitis: Grade 0 [Rectal Pain: Grade 0] : Rectal Pain: Grade 0 [Small Intestinal Obstruction: Grade 0] : Small Intestinal Obstruction: Grade 0 [Vomiting: Grade 0] : Vomiting: Grade 0 [Edema Limbs: Grade 0] : Edema Limbs: Grade 0  [Fatigue: Grade 1 - Fatigue relieved by rest] : Fatigue: Grade 1 - Fatigue relieved by rest [Localized Edema: Grade 0] : Localized Edema: Grade 0  [Neck Edema: Grade 0] : Neck Edema: Grade 0 [Hematuria: Grade 0] : Hematuria: Grade 0 [Genital Edema: Grade 0] : Genital Edema: Grade 0 [Vaginal Stricture: Grade 0] : Vaginal Stricture: Grade 0 [Blurred Vision: Grade 0] : Blurred Vision: Grade 0 [Cognitive Disturbance: Grade 0] : Cognitive Disturbance: Grade 0 [Concentration Impairment: Grade 0] : Concentration Impairment: Grade 0 [Dizziness: Grade 0] : Dizziness: Grade 0  [Facial Muscle Weakness: Grade 0] : Facial Muscle Weakness: Grade 0 [Headache: Grade 0] : Headache: Grade 0 [Lethargy: Grade 0] : Lethargy: Grade 0 [Anxiety: Grade 1 - Mild symptoms; intervention not indicated] : Anxiety: Grade 1 - Mild symptoms; intervention not indicated [Depression: Grade 0] : Depression: Grade 0 [Insomnia: Grade 0] : Insomnia: Grade 0 [Cough: Grade 0] : Cough: Grade 0 [Dyspnea: Grade 0] : Dyspnea: Grade 0 [Hiccups: Grade 0] : Hiccups: Grade 0 [Hoarseness: Grade 0] : Hoarseness: Grade 0 [Hypoxia: Grade 0] : Hypoxia: Grade 0 [Alopecia: Grade 0] : Alopecia: Grade 0 [Skin Atrophy: Grade 0] : Skin Atrophy: Grade 0 [Skin Hyperpigmentation: Grade 0] : Skin Hyperpigmentation: Grade 0 [Skin Induration: Grade 0] : Skin Induration: Grade 0 [Dermatitis Radiation: Grade 0] : Dermatitis Radiation: Grade 0 [Muscle Weakness] : no muscle weakness [Gait Disturbance] : no gait disturbance [FreeTextEntry8] : ileoconduit  clear yellow urine [FreeTextEntry9] : Left hand thumb and pointer finger [de-identified] : intermittent nausea [FreeTextEntry2] : ileoconduit [FreeTextEntry3] : ileoconduit [FreeTextEntry1] : baseline  sShe states she is anxious person

## 2024-05-08 NOTE — PHYSICAL EXAM
[Normal] : normal external genitalia [Normal] : oriented to person, place and time, the affect was normal, the mood was normal and not anxious

## 2024-05-10 LAB — VIT B12 SERPL-MCNC: 558 PG/ML

## 2024-05-20 ENCOUNTER — APPOINTMENT (OUTPATIENT)
Dept: UROLOGY | Facility: CLINIC | Age: 69
End: 2024-05-20
Payer: MEDICARE

## 2024-05-20 PROCEDURE — 99214 OFFICE O/P EST MOD 30 MIN: CPT

## 2024-05-21 LAB — URINE CYTOLOGY: NORMAL

## 2024-05-21 NOTE — HISTORY OF PRESENT ILLNESS
[FreeTextEntry1] : Language: English Date of First visit: 3/9/2023 Accompanied by: - Pratik Contact info: *** Referring Provider/PCP: Dr. Quirino Mehta [phone]    CC/ Problem List:  =============================================================================== FIRST VISIT: The patient was a 68 year female with an extensive smoking history and kidney stones who first presented on 2023 for gross hematuria.  She first noticed some blood on 3/3/2023 and went to the ER on Saturday for urinary retention. Dubose catheter was placed. PVR 1100 ml. No imaging was done in the ER. Patient was started on Cefpodoxime 200 mg BID for 10 days. She does reports clots and the urine is starting to clear up. Denies fevers, but did have chills.   She is an active smoker. Reports that she has cut down to 4 cigarettes a day over the past year. She has had kidney stones in the past, saw Dr. Rubin had started ER on medication to "break up the stones". Hematuria w/u in 2018 was negative with Dr. Rubin included cysto and CT Urogram.    ------------------------------------------------------------------------------------------- INTERVAL VISITS:  Her cysto showed bladder and urethral abnormalities that almost seemed submucosal  On 3/22/2023 she underwent a TURBT of her bladder neck and bladder which revealed at least T2 HG TCC. Interestingly some of the surface mucosa was NOT involved. She has been doing well with her dubose. She has not had dysuria/urethral burning. She quit smoking in mid March. She is eating a lot. On 2023 she passed her TOV but on 2023 th pt had her dubose put back in for retention. They just called her with results saying she had a UTI. She was asymptomatic.   She saw Dr. Banks and was started on neoadjuvant chemo ([place]/Cis) on 2023. She finished 4 cycles on 2023. A f/u Ct showed a decreased bladder mass size. On 2023 she underwent a robotic anterior exenteration, PLND, open ileal conduit. She had pT3N0 disease with a positive margin. She then started adjuvant Nivolumab in 2023.  An MRI of the pelvis in Dec 2023 showed indeterminate residual disease. A CT chest in 2024 showed no evidence of mets. She received RT to the pelvis and bladder which she completed on 3/29/2024.   The patient's age today 2024  is 69 year old. Please note interval events and changes in PMH, PSH, MEDS and ALLERGIES were reviewed. She currently gets Nivolumab Q4 weeks. She is due for another CT CAP in 2024 Her PCP is in [place] (Quirino Mehta). She is eating well and exercising and cleaning. She gets occasional vaginal discharge or pulling in her vagina. she denies hematuria The ostomy is doing well and has clear urine. She has no problems changing the appliance. Her meds are the same.   ===============================================================================  PMH: pre-HTN, Sleep apnea, M spike (monoclonal spike), urothelial carcinoma PSH: Bartholin's cyst, TURBT POBH: (if applicable)  FH: Mother had heart disease, Father had bone cancer  ALL: NKDA MEDS: Amlodipine, Cymbalta, Klonopin, Crestor, Vit D, MVI, Calcium, Pre-biotic/probiotic, Metamucil, Beet juice SOC: quit smoking, Denies EtOH, denies drugs   ROS: Review of Systems is as per HPI unless otherwise denoted below   =============================================================================== DATA:   LABS:------------------------------------------------------------------------------------------------------------------- 3/7/2023: HCT 32.7, sCRe 0.53 3/9/2023: Urine culture negative 2024: sCre 0.50 2024: sCre 0.70; normal CMP; HT 36.5   RADS:------------------------------------------------------------------------------------------------------------------- 3/8/2023: CTU 4.5 cm irregular thick walled rim-enhancing mass with central cystic/necrotic changes centered at level of urethra (left side near the bladder, moving more anteriorly distally) extending close to left ureteral orifice without hydronephrosis, suspect urothelial malignancy. Recommend cystoscopy and tissue biopsy correlation. No evidence of upper tract disease, distant metastases or suspicious lymphadenopathy. Thickened endometrial complex, correlate with pathology results given history of recent biopsy. films personally reviewed  2023: NCCT Thyroid nodule. (she had those recently checked) MIld centrilobular and moderate paraseptal emphysema is most pronounces in the apices. There are associated Blebs. Mild scarring. No discrete nodules. Mild ectasia. No interstitial disease. ADrenals are normal. No bone lesions   2023: CT CAP No evidence of mets. No sig hydro. Mild pelviectasis and ureteral fullness to level of bladder. Urethral lesion is smaller than prior with some enhancement.   2023: MRI pelvis Non-mass-like enhancement in the cystectomy surgical bed could be posttreatment related changes versus residual/recurrent tumor.  Consider FDG PET/CT correlation  2024: CT chest 1.  No evidence of abdominal or thoracic metastasis. 2.  Stable mild bilateral pelviectasis.   2024: Renal sono No hydro. Normal kidneys.    PATHOLOGY/CYTOLOGY:------------------------------------------------------------------------------------------- 3/7/2023: [place] [facility] [place]  3/22/2023: TURBT with bladder neck and bladder sections a. Left bladder neck:  HG TCC with glandular differentiation, T2, no LVI Surface involvement not seen b Left trigone HG TCC with glandular differentiation, T2, no LVI c. Anterior bladder neck HG TCC, T2, no LVI, surface involvement not seen d. Bladder neck biopsy HG TCC with glandular differentiation, T2, No LVI, surface involvement not seen   2023: Exenteration pT3a, high grade UC with glandular differentiation, infiltrates perivesical tissue and urethra Urethra margin with invasive urothelial cancer. Part of specimen was sent separately from bladder due to perivesical disease which may be cause of reported positive margin. Margin was grossly negative on resection 0/10 nodes negative   VOIDING STUDIES: ---------------------------------------------------------------------------------------------------- 2023: TOV I filled her with about 160cc. She voided into the toilet without straining. She did not leak. Her PVR was 23cc. We gave her a dose of Cefuroxime 500mg po.  2023: Dubose put back in for retention 2023: Dubose change 2023: Dubose change to 16Fr under aseptic conditions. Clear urine effluxed from catheter; Chaperone: AVEL Lin    STONE STUDIES: (Analysis/LLSA)----------------------------------------------------------------------------------    PROCEDURES: ----------------------------------------------------------------------------------------------- 3/9/2023: Cysto The patient had a flat solid appearing tumor involving the left trigone and lateral wall potentially going into the urethra.  There was some posterior wall / dome inflammation as well  I could not see either UO. ? Mass in the urethra A dubose was replaced after this procedure under aseptic conditions. Clear urine was obtained.  2023: Cath change The pts' catheter was changed aseptically to a new 16Fr Dubose connected to SD. Clear urine effluxed out.  2023: Cath change The pts' catheter was changed aseptically to a new 16Fr Dubose connected to SD. Clear urine effluxed out. Tolerated well  ===============================================================================  PHYSICAL EXAM:  GEN: AAOx3, NAD, Habitus: normal  BARRIERS to CARE: none  PSYCH: Appropriate Behavior, Affect Congruent  HEENT: AT/NC Trachea midline.   Lungs: No labored breathing.  NEURO: + Movement, all 4 extremities grossly intact without deficits. No tremors.  SKIN: Warm dry. No visible rashes or ulcers  GAIT: Gait normal, Stability good  ABD:  clear urine in ostomy   =======================================================================================   ASSESSMENT and PLAN  The patient is a 69 year female with a history of the followin. Muscle invasive HG TCC s/p exent for T3N0Mx bladder/urethral cancer, positive margin  She finished [place] Cis and we reviewed her CT scans and images.  She saw Dr. Banks and was started on neoadjuvant chemo ([place]/Cis) on 2023. She finished 4 cycles on 2023. A f/u Ct showed a decreased bladder mass size. On 2023 she underwent a robotic anterior exenteration, PLND, open ileal conduit. She had pT3N0 disease with a positive margin. She then started adjuvant Nivolumab in 2023.  Because of her positive urethral margin she was evaluated for RT. An MRI of the pelvis in Dec 2023 showed indeterminate residual disease. A CT chest in 2024 showed no evidence of mets. She had a MRI pelvis and CT chest in Dec 2023/2024 but has not had renal imaging since her extenteration and diversion. She finished RT on 3/29/2024 and she is on Nivolumab Q4 weeks. She is due for CT scans in 2024  NCCN 3 guidelines for MIBC s/p cystectomy   CTU or MRU Q3-6 mos for two years, CT chest every 3-6 mos for two years OR FDG PET if mets are suspected After that Abd/pelvic CT/MRI and Chest imaging is done annually for years 3-5 and PET if mets is suspected until year five Renal sono annually years 5-10   CBC and CMP Q3-6 mos for a year if the pt received chemo; after one year BMP, LFT and B12 annually until year 5; B12 annually thereafter Cytology Q6-12 mos for two years Consider urethral wash cytology Q6-12 mos for two years; Years 3-10 cytology as indicated   ----------------------------------------------------------------------------------------------------- LABS/TESTS Ordered: CTU and CT chest 2024; urine cytology Meds Ordered: Follow up: VV 2024 -----------------------------------------------------------------------------------------------------  The total amount of time I have personally spent preparing for this visit, reviewing the patient's test results, obtaining external history, ordering tests/medications, documenting clinical information, communicating with and counseling the patient/family and/or caregiver(s), and spent face to face with the patient explaining the above was 35 minutes.  Thank you for allowing me to assist in the care of your patient. Should you have any questions please do not hesitate to reach out to me.   Hayley Moulton MD Associate  Department of Urology [facility] Phone: [phone] Fax: [phone] [address] [address]

## 2024-05-23 ENCOUNTER — TRANSCRIPTION ENCOUNTER (OUTPATIENT)
Age: 69
End: 2024-05-23

## 2024-05-23 ENCOUNTER — NON-APPOINTMENT (OUTPATIENT)
Age: 69
End: 2024-05-23

## 2024-05-23 RX ORDER — SODIUM CHLORIDE 9 MG/ML
10 INJECTION INTRAMUSCULAR; INTRAVENOUS; SUBCUTANEOUS ONCE
Refills: 0 | Status: DISCONTINUED | OUTPATIENT
Start: 2024-05-24 | End: 2024-06-07

## 2024-05-24 ENCOUNTER — APPOINTMENT (OUTPATIENT)
Dept: INFUSION THERAPY | Facility: CLINIC | Age: 69
End: 2024-05-24

## 2024-05-24 ENCOUNTER — APPOINTMENT (OUTPATIENT)
Dept: HEMATOLOGY ONCOLOGY | Facility: CLINIC | Age: 69
End: 2024-05-24
Payer: MEDICARE

## 2024-05-24 ENCOUNTER — OUTPATIENT (OUTPATIENT)
Dept: OUTPATIENT SERVICES | Facility: HOSPITAL | Age: 69
LOS: 1 days | End: 2024-05-24
Payer: MEDICARE

## 2024-05-24 ENCOUNTER — LABORATORY RESULT (OUTPATIENT)
Age: 69
End: 2024-05-24

## 2024-05-24 VITALS
BODY MASS INDEX: 28.7 KG/M2 | SYSTOLIC BLOOD PRESSURE: 153 MMHG | TEMPERATURE: 97.8 F | HEART RATE: 65 BPM | DIASTOLIC BLOOD PRESSURE: 88 MMHG | HEIGHT: 63 IN | OXYGEN SATURATION: 97 % | WEIGHT: 162 LBS | RESPIRATION RATE: 18 BRPM

## 2024-05-24 VITALS
HEART RATE: 65 BPM | HEIGHT: 63 IN | RESPIRATION RATE: 16 BRPM | OXYGEN SATURATION: 97 % | WEIGHT: 162.04 LBS | SYSTOLIC BLOOD PRESSURE: 153 MMHG | DIASTOLIC BLOOD PRESSURE: 88 MMHG | TEMPERATURE: 98 F

## 2024-05-24 DIAGNOSIS — Z90.79 ACQUIRED ABSENCE OF OTHER GENITAL ORGAN(S): Chronic | ICD-10-CM

## 2024-05-24 DIAGNOSIS — C68.0 MALIGNANT NEOPLASM OF URETHRA: ICD-10-CM

## 2024-05-24 DIAGNOSIS — T45.1X5A TOXIC GASTROENTERITIS AND COLITIS: ICD-10-CM

## 2024-05-24 DIAGNOSIS — Z98.890 OTHER SPECIFIED POSTPROCEDURAL STATES: Chronic | ICD-10-CM

## 2024-05-24 DIAGNOSIS — K52.1 TOXIC GASTROENTERITIS AND COLITIS: ICD-10-CM

## 2024-05-24 DIAGNOSIS — N75.0 CYST OF BARTHOLIN'S GLAND: Chronic | ICD-10-CM

## 2024-05-24 DIAGNOSIS — U07.1 COVID-19: ICD-10-CM

## 2024-05-24 DIAGNOSIS — Z86.39 PERSONAL HISTORY OF OTHER ENDOCRINE, NUTRITIONAL AND METABOLIC DISEASE: ICD-10-CM

## 2024-05-24 LAB
ALBUMIN SERPL ELPH-MCNC: 4 G/DL
ALP BLD-CCNC: 76 U/L
ALT SERPL-CCNC: 17 U/L
ANION GAP SERPL CALC-SCNC: 4 MMOL/L
AST SERPL-CCNC: 30 U/L
BILIRUB SERPL-MCNC: 0.6 MG/DL
BUN SERPL-MCNC: 10 MG/DL
CALCIUM SERPL-MCNC: 9.8 MG/DL
CHLORIDE SERPL-SCNC: 107 MMOL/L
CO2 SERPL-SCNC: 26 MMOL/L
CREAT SERPL-MCNC: 0.7 MG/DL
EGFR: 92 ML/MIN/1.73M2
GLUCOSE SERPL-MCNC: 121 MG/DL
HCT VFR BLD CALC: 37.7 %
HGB BLD-MCNC: 12.1 G/DL
LYMPHOCYTES # BLD AUTO: 1.5 K/UL
LYMPHOCYTES NFR BLD AUTO: 28.9 %
MAN DIFF?: NO
MCHC RBC-ENTMCNC: 28.7 PG
MCHC RBC-ENTMCNC: 32.1 GM/DL
MCV RBC AUTO: 89.5 FL
NEUTROPHILS # BLD AUTO: 3 K/UL
NEUTROPHILS NFR BLD AUTO: 59.1 %
PLATELET # BLD AUTO: 214 K/UL
POTASSIUM SERPL-SCNC: 5 MMOL/L
PROT SERPL-MCNC: 7.2 G/DL
RBC # BLD: 4.21 M/UL
RBC # FLD: 15.3 %
SODIUM SERPL-SCNC: 137 MMOL/L
WBC # FLD AUTO: 5.1 K/UL

## 2024-05-24 PROCEDURE — 96413 CHEMO IV INFUSION 1 HR: CPT

## 2024-05-24 PROCEDURE — 99214 OFFICE O/P EST MOD 30 MIN: CPT | Mod: 25

## 2024-05-24 PROCEDURE — 36415 COLL VENOUS BLD VENIPUNCTURE: CPT

## 2024-05-24 RX ORDER — NIVOLUMAB 10 MG/ML
480 INJECTION INTRAVENOUS ONCE
Refills: 0 | Status: COMPLETED | OUTPATIENT
Start: 2024-05-24 | End: 2024-05-24

## 2024-05-24 RX ADMIN — NIVOLUMAB 480 MILLIGRAM(S): 10 INJECTION INTRAVENOUS at 11:40

## 2024-05-24 RX ADMIN — NIVOLUMAB 480 MILLIGRAM(S): 10 INJECTION INTRAVENOUS at 12:10

## 2024-05-28 PROBLEM — U07.1 COVID-19: Status: RESOLVED | Noted: 2024-02-11 | Resolved: 2024-05-28

## 2024-05-28 PROBLEM — Z86.39 HISTORY OF HYPOKALEMIA: Status: RESOLVED | Noted: 2023-11-13 | Resolved: 2024-05-28

## 2024-05-28 PROBLEM — K52.1 CHEMOTHERAPY-INDUCED DIARRHEA: Status: RESOLVED | Noted: 2023-04-19 | Resolved: 2024-05-28

## 2024-05-28 LAB — TSH SERPL-ACNC: 5.15 UIU/ML

## 2024-05-28 NOTE — HISTORY OF PRESENT ILLNESS
[de-identified] : Sandra Alston is a 69 year old  referred by Dr. Moulton for evaluation for urothelial carcinoma involving bladder and urethra. Here today for f/u and tx.   Oncologic history: 1.  urothelial carcinoma involving bladder and urethra --presented with hematuria and urinary retention --CT scan urogram 3/8/23 showed a 4.5 cm thick walled, rim enhancing mass with necrosis at the level of the urethra extending close to the L ureteral orifice.  No lymphadenopathy.  no visceral metastatic disease --cystoscopy 3/9/23 showed a flat solid appearing tumor at the L trigone and Lateral wall of the bladder extending into the urethra --TURBT 3/22/23:  biopsies from L bladder neck, L trigone, anterior bladder neck, and bladder neck all showing high grade, muscle invasive urothelial carcinoma. --chest CT 4/14/23 negative for thoracic metastatic disease --neoadjuvant chemotherapy with cisplatin and gemcitabine x 4 cycles 4/27/23 - 7/6/23 --CT c/a/p 7/2023 after completing NACT showed partial response, no interval development of metastatic disease --s/p robotic anterior exenteration, PLND with ileal conduit formation 9/5/23 (Dr Keller). Pathology: pT3a, high grade urothelial carcinoma with glandular and mucinous features, infiltrates perivesical adipose tissue and urethra.  Urethral margin positive for invasive carcinoma.  0/10 LN involved.  [id number] --initiated adjuvant immunotherapy with Nivolumab on 10/23/23 --RT to surgical bed for positive urethral margin 1/30/24 - 3/29/24  PMH also notable for MGUS - no records - follows with Dr Brown on [place].  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 , lives on [place] [de-identified] : Here for f/u with Nivolumab.   endorses anxiety.  has had excessive worry, phoned MD office 8 times about an abnormal test result owing to worry.  has caused some stress in her relationship w/ her .  A close friend also recently had a major health issue which also contributed to her worry. denies N/V/D denies skin rash denies frequent cough, SOB. + fatigue + unintentional weight gain.  Has started exercising again.  is also going for walks daily.

## 2024-05-28 NOTE — ASSESSMENT
[FreeTextEntry1] : Sandra Alston is a 69 year old female who presented with gross hematuria and urinary retention. CT urogram showed a 4 cm tumor in the bladder and extending into the mid urethra. No lymphadenopathy or distant metastatic disease on CT c/a/p. 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 Completed neoadjuvant chemotherapy with gemcitabine and cisplatin 4/27/23- 7/6/23.  S/p robotic anterior exenteration, pelvic LN dissection with ileal conduit formation on 9/5/23 (Dr Keller). Pathology showed residual high grade urothelial carcinoma with glandular and mucinous features, infiltrates perivesical adipose tissue and urethra.  Urethral margin positive for invasive carcinoma.  0/10 LN involved.  [id number].  Initiated adjuvant immunotherapy with Nivolumab on 10/23/23.  RT to surgical bed for positive urethral margin 1/30/24 - 3/29/24.   Plan: #. 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  She had extensive residual disease after neoadjuvant chemotherapy and a positive surgical margin.  High risk for recurrence/ metastasis and needs to be monitored carefully. --recommended adjuvant treatment w/ nivolumab x 1 year. Initiated nivolumab on 10/23/23, tolerating treatment well without any dose-limiting checkpoint inhibitor toxicities. Labs reviewed and ok to proceed with Nivolumab today. --Next surveillance CT C/A/P will be in June-July as ordered by Dr. Moulton.  --B12 annually for urostomy/ileal conduit, checked 5/2024 and normal.  #. MGUS --checked labs 4/2024.  M spike detectable but not measurable.  reassurance provided.  can monitor annually.  #. h/o Iron deficiency --on PO iron every other day,  Ferritin/Iron/TIBC WNL on 10/20/23.   #.  anxiety due to cancer --discussed coping strategies.  Offered to connect her w/ SW re: support groups.  Also reminded her of availability of the psycho-oncologist for counseling.  She will consider these options but declined to accept any referrals at this visit.  #  abnormal thyroid function tests --TSH mildly elevated but free T4 normal.  repeat next visit.  #. health maintenance --no need for pap smear given h/o radical hysterectomy on 9/5/23 --last mammogram in March 2022, should proceed w/ mammogram this year if she remains cancer-free. --exercise, healthy diet recommended for unintentional weight gain.      RTC with nivolumab 480mg on 6/21/24 labs- CBC, CMP, TSH/fT4

## 2024-05-28 NOTE — PHYSICAL EXAM
[Restricted in physically strenuous activity but ambulatory and able to carry out work of a light or sedentary nature] : Status 1- Restricted in physically strenuous activity but ambulatory and able to carry out work of a light or sedentary nature, e.g., light house work, office work [Normal] : no peripheral adenopathy appreciated [de-identified] : chemoport in place, no sign of infection.  no edema. [de-identified] : not distended, soft,  non tender.  [de-identified] : +ileal conduit

## 2024-06-11 ENCOUNTER — NON-APPOINTMENT (OUTPATIENT)
Age: 69
End: 2024-06-11

## 2024-06-21 ENCOUNTER — OUTPATIENT (OUTPATIENT)
Dept: OUTPATIENT SERVICES | Facility: HOSPITAL | Age: 69
LOS: 1 days | End: 2024-06-21
Payer: MEDICARE

## 2024-06-21 ENCOUNTER — APPOINTMENT (OUTPATIENT)
Dept: HEMATOLOGY ONCOLOGY | Facility: CLINIC | Age: 69
End: 2024-06-21
Payer: MEDICARE

## 2024-06-21 ENCOUNTER — APPOINTMENT (OUTPATIENT)
Dept: INFUSION THERAPY | Facility: CLINIC | Age: 69
End: 2024-06-21

## 2024-06-21 VITALS
TEMPERATURE: 98 F | DIASTOLIC BLOOD PRESSURE: 89 MMHG | OXYGEN SATURATION: 99 % | HEART RATE: 66 BPM | SYSTOLIC BLOOD PRESSURE: 132 MMHG | RESPIRATION RATE: 18 BRPM

## 2024-06-21 VITALS
DIASTOLIC BLOOD PRESSURE: 85 MMHG | TEMPERATURE: 98 F | WEIGHT: 160.94 LBS | SYSTOLIC BLOOD PRESSURE: 151 MMHG | HEIGHT: 63 IN | RESPIRATION RATE: 17 BRPM | OXYGEN SATURATION: 99 % | HEART RATE: 86 BPM

## 2024-06-21 VITALS
HEART RATE: 86 BPM | SYSTOLIC BLOOD PRESSURE: 151 MMHG | BODY MASS INDEX: 28.53 KG/M2 | WEIGHT: 161 LBS | OXYGEN SATURATION: 99 % | HEIGHT: 63 IN | DIASTOLIC BLOOD PRESSURE: 85 MMHG | TEMPERATURE: 98.2 F | RESPIRATION RATE: 18 BRPM

## 2024-06-21 DIAGNOSIS — N75.0 CYST OF BARTHOLIN'S GLAND: Chronic | ICD-10-CM

## 2024-06-21 DIAGNOSIS — F41.9 ANXIETY DISORDER, UNSPECIFIED: ICD-10-CM

## 2024-06-21 DIAGNOSIS — R94.6 ABNORMAL RESULTS OF THYROID FUNCTION STUDIES: ICD-10-CM

## 2024-06-21 DIAGNOSIS — Z98.890 OTHER SPECIFIED POSTPROCEDURAL STATES: Chronic | ICD-10-CM

## 2024-06-21 DIAGNOSIS — Z90.79 ACQUIRED ABSENCE OF OTHER GENITAL ORGAN(S): Chronic | ICD-10-CM

## 2024-06-21 DIAGNOSIS — C67.9 MALIGNANT NEOPLASM OF BLADDER, UNSPECIFIED: ICD-10-CM

## 2024-06-21 DIAGNOSIS — C68.0 MALIGNANT NEOPLASM OF URETHRA: ICD-10-CM

## 2024-06-21 LAB
ALBUMIN SERPL ELPH-MCNC: 3.7 G/DL
ALP BLD-CCNC: 82 U/L
ALT SERPL-CCNC: 24 U/L
ANION GAP SERPL CALC-SCNC: 7 MMOL/L
AST SERPL-CCNC: 30 U/L
BILIRUB SERPL-MCNC: 0.6 MG/DL
BUN SERPL-MCNC: 9 MG/DL
CALCIUM SERPL-MCNC: 9.4 MG/DL
CHLORIDE SERPL-SCNC: 107 MMOL/L
CO2 SERPL-SCNC: 29 MMOL/L
CREAT SERPL-MCNC: 0.6 MG/DL
EGFR: 96 ML/MIN/1.73M2
GLUCOSE SERPL-MCNC: 118 MG/DL
HCT VFR BLD CALC: 37.9 %
HGB BLD-MCNC: 12.4 G/DL
LYMPHOCYTES # BLD AUTO: 1.6 K/UL
LYMPHOCYTES NFR BLD AUTO: 27 %
MAN DIFF?: NO
MCHC RBC-ENTMCNC: 29.3 PG
MCHC RBC-ENTMCNC: 32.7 GM/DL
MCV RBC AUTO: 89.6 FL
NEUTROPHILS # BLD AUTO: 3.5 K/UL
NEUTROPHILS NFR BLD AUTO: 61.1 %
PLATELET # BLD AUTO: 206 K/UL
POTASSIUM SERPL-SCNC: 4.1 MMOL/L
PROT SERPL-MCNC: 7.3 G/DL
RBC # BLD: 4.23 M/UL
RBC # FLD: 14.1 %
SODIUM SERPL-SCNC: 143 MMOL/L
WBC # FLD AUTO: 5.8 K/UL

## 2024-06-21 PROCEDURE — 36415 COLL VENOUS BLD VENIPUNCTURE: CPT

## 2024-06-21 PROCEDURE — 96413 CHEMO IV INFUSION 1 HR: CPT

## 2024-06-21 PROCEDURE — 99213 OFFICE O/P EST LOW 20 MIN: CPT | Mod: 25

## 2024-06-21 RX ORDER — NIVOLUMAB 10 MG/ML
480 INJECTION INTRAVENOUS ONCE
Refills: 0 | Status: COMPLETED | OUTPATIENT
Start: 2024-06-21 | End: 2024-06-21

## 2024-06-21 RX ORDER — SODIUM CHLORIDE 0.9 % (FLUSH) 0.9 %
10 SYRINGE (ML) INJECTION ONCE
Refills: 0 | Status: COMPLETED | OUTPATIENT
Start: 2024-06-21 | End: 2024-06-21

## 2024-06-21 RX ORDER — FERROUS SULFATE 325(65) MG
325 (65 FE) TABLET ORAL
Refills: 0 | Status: DISCONTINUED | COMMUNITY
End: 2024-06-21

## 2024-06-21 RX ADMIN — NIVOLUMAB 480 MILLIGRAM(S): 10 INJECTION INTRAVENOUS at 10:42

## 2024-06-21 RX ADMIN — Medication 10 MILLILITER(S): at 11:32

## 2024-06-21 RX ADMIN — NIVOLUMAB 480 MILLIGRAM(S): 10 INJECTION INTRAVENOUS at 11:30

## 2024-06-21 NOTE — ASSESSMENT
[FreeTextEntry1] : Sandra Alston is a 69 year old female who presented with gross hematuria and urinary retention. CT urogram showed a 4 cm tumor in the bladder and extending into the mid urethra. No lymphadenopathy or distant metastatic disease on CT c/a/p. 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 Completed neoadjuvant chemotherapy with gemcitabine and cisplatin 4/27/23- 7/6/23.  S/p robotic anterior exenteration, pelvic LN dissection with ileal conduit formation on 9/5/23 (Dr Keller). Pathology showed residual high grade urothelial carcinoma with glandular and mucinous features, infiltrates perivesical adipose tissue and urethra.  Urethral margin positive for invasive carcinoma.  0/10 LN involved.  [id number].  Initiated adjuvant immunotherapy with Nivolumab on 10/23/23.  RT to surgical bed for positive urethral margin 1/30/24 - 3/29/24.  Plan: #. 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  She had extensive residual disease after neoadjuvant chemotherapy and a positive surgical margin.  High risk for recurrence/ metastasis and needs to be monitored carefully. --recommended adjuvant treatment w/ nivolumab x 1 year. Initiated nivolumab on 10/23/23, tolerating treatment well without any dose-limiting checkpoint inhibitor toxicities. Labs reviewed and ok to proceed with Nivolumab today. no concerns at this visit. continue Nivolumab  every 4 weeks --Next surveillance CT C/A/P will be on 7/9/24, as ordered by Dr. Moulton.  --B12 annually for urostomy/ileal conduit, checked 5/2024 and normal.  #. MGUS --checked labs 4/2024. M spike detectable but not measurable.  reassurance provided.  can monitor annually.  #. h/o Iron deficiency --stopped taking PO iron due to constipation. Ferritin/Iron/TIBC WNL on 10/20/23.   #.  anxiety due to cancer --discussed coping strategies.  Offered to connect her w/ SW re: support groups.  Also reminded her of availability of the psycho-oncologist for counseling.  She will consider these options but declined to accept any referrals at this visit. --emotional support provided.    #  abnormal thyroid function tests --TSH mildly elevated but free T4 normal on 5/24., pending today   #. health maintenance --no need for pap smear given h/o radical hysterectomy on 9/5/23 --last mammogram in March 2022, should proceed w/ mammogram this year if she remains cancer-free. --exercise, healthy diet recommended for unintentional weight gain.     RTC with nivolumab 480mg on 7/19 labs- CBC, CMP, TSH/fT4

## 2024-06-21 NOTE — PHYSICAL EXAM
[Restricted in physically strenuous activity but ambulatory and able to carry out work of a light or sedentary nature] : Status 1- Restricted in physically strenuous activity but ambulatory and able to carry out work of a light or sedentary nature, e.g., light house work, office work [Normal] : affect appropriate [de-identified] : chemoport in place, no sign of infection.  no edema. [de-identified] : not distended, soft,  non tender.  [de-identified] : +ileal conduit

## 2024-06-21 NOTE — HISTORY OF PRESENT ILLNESS
[de-identified] : Sandra Alston is a 69 year old  referred by Dr. Moulton for evaluation for urothelial carcinoma involving bladder and urethra. Here today for f/u and tx.   Oncologic history: 1.  urothelial carcinoma involving bladder and urethra --presented with hematuria and urinary retention --CT scan urogram 3/8/23 showed a 4.5 cm thick walled, rim enhancing mass with necrosis at the level of the urethra extending close to the L ureteral orifice.  No lymphadenopathy.  no visceral metastatic disease --cystoscopy 3/9/23 showed a flat solid appearing tumor at the L trigone and Lateral wall of the bladder extending into the urethra --TURBT 3/22/23:  biopsies from L bladder neck, L trigone, anterior bladder neck, and bladder neck all showing high grade, muscle invasive urothelial carcinoma. --chest CT 4/14/23 negative for thoracic metastatic disease --neoadjuvant chemotherapy with cisplatin and gemcitabine x 4 cycles 4/27/23 - 7/6/23 --CT c/a/p 7/2023 after completing NACT showed partial response, no interval development of metastatic disease --s/p robotic anterior exenteration, PLND with ileal conduit formation 9/5/23 (Dr Keller). Pathology: pT3a, high grade urothelial carcinoma with glandular and mucinous features, infiltrates perivesical adipose tissue and urethra.  Urethral margin positive for invasive carcinoma.  0/10 LN involved.  [id number] --initiated adjuvant immunotherapy with Nivolumab on 10/23/23 --RT to surgical bed for positive urethral margin 1/30/24 - 3/29/24  PMH also notable for MGUS - no records - follows with Dr Brown on [place].  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   she experienced headache and unsteady gait x1 days, then resolved, had mild itching w/o skin rash after the infusion and relieved with Cetaphil lotion.  she had US duplex b/l LE last week and developed itching with rash on LE. resolved now.  may be allergic to ultrasound gel.?  Of note, no DVT as per pt.  doing exercise regularly, trying to eat less.  denies N/V/D, skin rash, frequent cough, SOB. + fatigue, manageable  stopped taking PO iron due to constipation.   Overall she feels ok.  anxiety stable.

## 2024-06-21 NOTE — PHYSICAL EXAM
[Restricted in physically strenuous activity but ambulatory and able to carry out work of a light or sedentary nature] : Status 1- Restricted in physically strenuous activity but ambulatory and able to carry out work of a light or sedentary nature, e.g., light house work, office work [Normal] : affect appropriate [de-identified] : not distended, soft,  non tender.  [de-identified] : chemoport in place, no sign of infection.  no edema. [de-identified] : +ileal conduit

## 2024-06-21 NOTE — ASSESSMENT
[FreeTextEntry1] : Sandra Alston is a 69 year old female who presented with gross hematuria and urinary retention. CT urogram showed a 4 cm tumor in the bladder and extending into the mid urethra. No lymphadenopathy or distant metastatic disease on CT c/a/p. 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 Completed neoadjuvant chemotherapy with gemcitabine and cisplatin 4/27/23- 7/6/23.  S/p robotic anterior exenteration, pelvic LN dissection with ileal conduit formation on 9/5/23 (Dr Keller). Pathology showed residual high grade urothelial carcinoma with glandular and mucinous features, infiltrates perivesical adipose tissue and urethra.  Urethral margin positive for invasive carcinoma.  0/10 LN involved.  [id number].  Initiated adjuvant immunotherapy with Nivolumab on 10/23/23.  RT to surgical bed for positive urethral margin 1/30/24 - 3/29/24.  Plan: #. 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  She had extensive residual disease after neoadjuvant chemotherapy and a positive surgical margin.  High risk for recurrence/ metastasis and needs to be monitored carefully. --recommended adjuvant treatment w/ nivolumab x 1 year. Initiated nivolumab on 10/23/23, tolerating treatment well without any dose-limiting checkpoint inhibitor toxicities. Labs reviewed and ok to proceed with Nivolumab today. no concerns at this visit. continue Nivolumab  every 4 weeks --Next surveillance CT C/A/P will be on 7/9/24, as ordered by Dr. Moulton.  --B12 annually for urostomy/ileal conduit, checked 5/2024 and normal.  #. MGUS --checked labs 4/2024. M spike detectable but not measurable.  reassurance provided.  can monitor annually.  #. h/o Iron deficiency --stopped taking PO iron due to constipation. Ferritin/Iron/TIBC WNL on 10/20/23.   #.  anxiety due to cancer --discussed coping strategies.  Offered to connect her w/ SW re: support groups.  Also reminded her of availability of the psycho-oncologist for counseling.  She will consider these options but declined to accept any referrals at this visit. --emotional support provided.    #  abnormal thyroid function tests --TSH mildly elevated but free T4 normal on 5/24., pending today   #. health maintenance --no need for pap smear given h/o radical hysterectomy on 9/5/23 --last mammogram in March 2022, should proceed w/ mammogram this year if she remains cancer-free. --exercise, healthy diet recommended for unintentional weight gain.     RTC with nivolumab 480mg on 7/19 labs- CBC, CMP, TSH/fT4

## 2024-06-24 ENCOUNTER — NON-APPOINTMENT (OUTPATIENT)
Age: 69
End: 2024-06-24

## 2024-06-24 LAB — TSH SERPL-ACNC: 4.07 UIU/ML

## 2024-07-09 ENCOUNTER — OUTPATIENT (OUTPATIENT)
Dept: OUTPATIENT SERVICES | Facility: HOSPITAL | Age: 69
LOS: 1 days | End: 2024-07-09
Payer: MEDICARE

## 2024-07-09 ENCOUNTER — RESULT REVIEW (OUTPATIENT)
Age: 69
End: 2024-07-09

## 2024-07-09 ENCOUNTER — APPOINTMENT (OUTPATIENT)
Dept: CT IMAGING | Facility: HOSPITAL | Age: 69
End: 2024-07-09

## 2024-07-09 DIAGNOSIS — N75.0 CYST OF BARTHOLIN'S GLAND: Chronic | ICD-10-CM

## 2024-07-09 DIAGNOSIS — Z90.79 ACQUIRED ABSENCE OF OTHER GENITAL ORGAN(S): Chronic | ICD-10-CM

## 2024-07-09 DIAGNOSIS — Z98.890 OTHER SPECIFIED POSTPROCEDURAL STATES: Chronic | ICD-10-CM

## 2024-07-09 LAB — POCT ISTAT CREATININE: 0.6 MG/DL — SIGNIFICANT CHANGE UP (ref 0.5–1.3)

## 2024-07-09 PROCEDURE — 71260 CT THORAX DX C+: CPT | Mod: 26

## 2024-07-09 PROCEDURE — 74178 CT ABD&PLV WO CNTR FLWD CNTR: CPT

## 2024-07-09 PROCEDURE — 74178 CT ABD&PLV WO CNTR FLWD CNTR: CPT | Mod: 26

## 2024-07-09 PROCEDURE — 71260 CT THORAX DX C+: CPT

## 2024-07-09 PROCEDURE — 82565 ASSAY OF CREATININE: CPT

## 2024-07-19 ENCOUNTER — LABORATORY RESULT (OUTPATIENT)
Age: 69
End: 2024-07-19

## 2024-07-19 ENCOUNTER — APPOINTMENT (OUTPATIENT)
Dept: INFUSION THERAPY | Facility: CLINIC | Age: 69
End: 2024-07-19

## 2024-07-19 ENCOUNTER — APPOINTMENT (OUTPATIENT)
Dept: HEMATOLOGY ONCOLOGY | Facility: CLINIC | Age: 69
End: 2024-07-19
Payer: MEDICARE

## 2024-07-19 ENCOUNTER — OUTPATIENT (OUTPATIENT)
Dept: OUTPATIENT SERVICES | Facility: HOSPITAL | Age: 69
LOS: 1 days | End: 2024-07-19
Payer: MEDICARE

## 2024-07-19 VITALS
OXYGEN SATURATION: 99 % | DIASTOLIC BLOOD PRESSURE: 81 MMHG | RESPIRATION RATE: 18 BRPM | TEMPERATURE: 98 F | WEIGHT: 162.92 LBS | HEIGHT: 63 IN | HEART RATE: 63 BPM | SYSTOLIC BLOOD PRESSURE: 146 MMHG

## 2024-07-19 VITALS
HEART RATE: 63 BPM | DIASTOLIC BLOOD PRESSURE: 81 MMHG | SYSTOLIC BLOOD PRESSURE: 146 MMHG | HEIGHT: 63 IN | RESPIRATION RATE: 18 BRPM | WEIGHT: 163 LBS | BODY MASS INDEX: 28.88 KG/M2 | TEMPERATURE: 97.9 F | OXYGEN SATURATION: 96 %

## 2024-07-19 DIAGNOSIS — F41.9 ANXIETY DISORDER, UNSPECIFIED: ICD-10-CM

## 2024-07-19 DIAGNOSIS — C67.9 MALIGNANT NEOPLASM OF BLADDER, UNSPECIFIED: ICD-10-CM

## 2024-07-19 DIAGNOSIS — Z98.890 OTHER SPECIFIED POSTPROCEDURAL STATES: Chronic | ICD-10-CM

## 2024-07-19 DIAGNOSIS — N75.0 CYST OF BARTHOLIN'S GLAND: Chronic | ICD-10-CM

## 2024-07-19 DIAGNOSIS — R94.6 ABNORMAL RESULTS OF THYROID FUNCTION STUDIES: ICD-10-CM

## 2024-07-19 DIAGNOSIS — Z90.79 ACQUIRED ABSENCE OF OTHER GENITAL ORGAN(S): Chronic | ICD-10-CM

## 2024-07-19 DIAGNOSIS — C68.0 MALIGNANT NEOPLASM OF URETHRA: ICD-10-CM

## 2024-07-19 LAB
ALBUMIN SERPL ELPH-MCNC: 3.9 G/DL
ALP BLD-CCNC: 67 U/L
ALT SERPL-CCNC: 22 U/L
ANION GAP SERPL CALC-SCNC: 3 MMOL/L
AST SERPL-CCNC: 29 U/L
BILIRUB SERPL-MCNC: 0.6 MG/DL
BUN SERPL-MCNC: 8 MG/DL
CALCIUM SERPL-MCNC: 9.9 MG/DL
CHLORIDE SERPL-SCNC: 107 MMOL/L
CO2 SERPL-SCNC: 29 MMOL/L
CREAT SERPL-MCNC: 0.8 MG/DL
EGFR: 79 ML/MIN/1.73M2
GLUCOSE SERPL-MCNC: 134 MG/DL
HCT VFR BLD CALC: 37 %
HGB BLD-MCNC: 12.2 G/DL
LYMPHOCYTES # BLD AUTO: 1.4 K/UL
LYMPHOCYTES NFR BLD AUTO: 24.9 %
MAN DIFF?: NO
MCHC RBC-ENTMCNC: 29.4 PG
MCHC RBC-ENTMCNC: 33 GM/DL
MCV RBC AUTO: 89.2 FL
NEUTROPHILS # BLD AUTO: 3.9 K/UL
NEUTROPHILS NFR BLD AUTO: 67.9 %
PLATELET # BLD AUTO: 208 K/UL
POTASSIUM SERPL-SCNC: 3.6 MMOL/L
PROT SERPL-MCNC: 6.8 G/DL
RBC # BLD: 4.15 M/UL
RBC # FLD: 13.8 %
SODIUM SERPL-SCNC: 139 MMOL/L
WBC # FLD AUTO: 5.7 K/UL

## 2024-07-19 PROCEDURE — 36415 COLL VENOUS BLD VENIPUNCTURE: CPT

## 2024-07-19 PROCEDURE — 96413 CHEMO IV INFUSION 1 HR: CPT

## 2024-07-19 PROCEDURE — 99214 OFFICE O/P EST MOD 30 MIN: CPT | Mod: 25

## 2024-07-19 RX ORDER — NIVOLUMAB 10 MG/ML
480 INJECTION INTRAVENOUS ONCE
Refills: 0 | Status: COMPLETED | OUTPATIENT
Start: 2024-07-19 | End: 2024-07-19

## 2024-07-19 RX ORDER — SODIUM CHLORIDE 0.9 % (FLUSH) 0.9 %
10 SYRINGE (ML) INJECTION ONCE
Refills: 0 | Status: COMPLETED | OUTPATIENT
Start: 2024-07-19 | End: 2024-07-19

## 2024-07-19 RX ADMIN — NIVOLUMAB 480 MILLIGRAM(S): 10 INJECTION INTRAVENOUS at 12:59

## 2024-07-19 RX ADMIN — Medication 10 MILLILITER(S): at 13:00

## 2024-07-19 RX ADMIN — NIVOLUMAB 480 MILLIGRAM(S): 10 INJECTION INTRAVENOUS at 12:20

## 2024-07-20 LAB — TSH SERPL-ACNC: 7.84 UIU/ML

## 2024-07-31 ENCOUNTER — APPOINTMENT (OUTPATIENT)
Dept: PULMONOLOGY | Facility: CLINIC | Age: 69
End: 2024-07-31
Payer: MEDICARE

## 2024-07-31 VITALS
HEIGHT: 63 IN | HEART RATE: 62 BPM | DIASTOLIC BLOOD PRESSURE: 75 MMHG | OXYGEN SATURATION: 94 % | SYSTOLIC BLOOD PRESSURE: 130 MMHG

## 2024-07-31 VITALS — HEIGHT: 60 IN

## 2024-07-31 DIAGNOSIS — G47.9 SLEEP DISORDER, UNSPECIFIED: ICD-10-CM

## 2024-07-31 DIAGNOSIS — E66.9 OBESITY, UNSPECIFIED: ICD-10-CM

## 2024-07-31 DIAGNOSIS — G47.33 OBSTRUCTIVE SLEEP APNEA (ADULT) (PEDIATRIC): ICD-10-CM

## 2024-07-31 PROCEDURE — 99204 OFFICE O/P NEW MOD 45 MIN: CPT

## 2024-07-31 NOTE — ASSESSMENT
[FreeTextEntry1] : Assessment: - Summary : The patient has a history of bladder cancer, for which she underwent surgical treatment, and is currently undergoing immunotherapy. She also has sleep apnea and is using a CPAP machine,  but is experiencing discomfort with the current mask and reporting headaches and dry mouth. She needs a new CPAP unit. - Problems : - Bladder cancer (status post-surgical treatment)  - Sleep apnea  - Differential Diagnosis : - Headaches and dry mouth could be related to the current CPAP mask and air leakage, or side effects of immunotherapy.  Plan: - Summary : The plan is to provide the patient with a new CPAP machine and a hybrid full-face mask to address the discomfort and potential air leakage issues. A follow-up visit will be scheduled after receiving the new equipment to ensure proper use and benefit. - Plan : - Order a new auto-adjusting CPAP machine and a hybrid full-face mask for the patient.  - Schedule a follow-up visit (in-person or tele-visit) within 90 days after receiving the new equipment to assess proper use, benefit, and document for insurance purposes.  - Advise the patient to contact the vendor for new supplies every three months as per the maintenance contract.  - Continue monitoring the patient's response to immunotherapy and address any concerns or side effects.

## 2024-07-31 NOTE — PHYSICAL EXAM
[No Acute Distress] : no acute distress [Elongated Uvula] : elongated uvula [Enlarged Base of the Tongue] : enlarged base of the tongue [Normal Appearance] : normal appearance [No Neck Mass] : no neck mass [Normal Rate/Rhythm] : normal rate/rhythm [Normal S1, S2] : normal s1, s2 [No Murmurs] : no murmurs [No Resp Distress] : no resp distress [Clear to Auscultation Bilaterally] : clear to auscultation bilaterally [No Abnormalities] : no abnormalities [Benign] : benign [Normal Gait] : normal gait [No Clubbing] : no clubbing [No Cyanosis] : no cyanosis [No Edema] : no edema [FROM] : FROM [Normal Color/ Pigmentation] : normal color/ pigmentation [No Focal Deficits] : no focal deficits [Oriented x3] : oriented x3 [Normal Affect] : normal affect

## 2024-07-31 NOTE — HISTORY OF PRESENT ILLNESS
[TextBox_4] : Subjective: - Summary : The patient, Sandra Chen, reported a history of bladder cancer and subsequent surgical treatment, as well as ongoing issues with sleep apnea and the use of a CPAP machine. - Chief Complaint (CC) : Follow-up visit for sleep apnea and bladder cancer. - History of Present Illness (HPI) :  Follow-up visit for sleep apnea and bladder cancer. Headaches, dry mouth, and discomfort with current CPAP mask.  Moderate discomfort with current CPAP mask. Associated Signs and Symptoms: Headaches, dry mouth. Temporal Factors: Not specified. Context: Follow-up visit for sleep apnea and bladder cancer. Associated Symptoms: Headaches, dry mouth. Severity and Impact: Moderate discomfort with current CPAP mask. Patient's Medical Regimen: Currently using CPAP machine and undergoing immunotherapy.  Recent Medical Interventions: Bladder cancer surgery, immunotherapy. - Past Medical History : Bladder cancer, sleep apnea. - Past Surgical History : Bladder cancer surgery (removal of bladder, female organs).

## 2024-08-16 ENCOUNTER — LABORATORY RESULT (OUTPATIENT)
Age: 69
End: 2024-08-16

## 2024-08-16 ENCOUNTER — APPOINTMENT (OUTPATIENT)
Dept: HEMATOLOGY ONCOLOGY | Facility: CLINIC | Age: 69
End: 2024-08-16
Payer: MEDICARE

## 2024-08-16 ENCOUNTER — APPOINTMENT (OUTPATIENT)
Dept: INFUSION THERAPY | Facility: CLINIC | Age: 69
End: 2024-08-16

## 2024-08-16 VITALS
TEMPERATURE: 98.1 F | OXYGEN SATURATION: 98 % | HEIGHT: 63 IN | BODY MASS INDEX: 28.7 KG/M2 | HEART RATE: 73 BPM | RESPIRATION RATE: 18 BRPM | WEIGHT: 162 LBS | SYSTOLIC BLOOD PRESSURE: 140 MMHG | DIASTOLIC BLOOD PRESSURE: 82 MMHG

## 2024-08-16 DIAGNOSIS — C67.9 MALIGNANT NEOPLASM OF BLADDER, UNSPECIFIED: ICD-10-CM

## 2024-08-16 DIAGNOSIS — R94.6 ABNORMAL RESULTS OF THYROID FUNCTION STUDIES: ICD-10-CM

## 2024-08-16 DIAGNOSIS — F41.9 ANXIETY DISORDER, UNSPECIFIED: ICD-10-CM

## 2024-08-16 LAB
ALBUMIN SERPL ELPH-MCNC: 4 G/DL
ALP BLD-CCNC: 78 U/L
ALT SERPL-CCNC: 19 U/L
ANION GAP SERPL CALC-SCNC: 8 MMOL/L
AST SERPL-CCNC: 28 U/L
BILIRUB SERPL-MCNC: 0.6 MG/DL
BUN SERPL-MCNC: 9 MG/DL
CALCIUM SERPL-MCNC: 9.7 MG/DL
CHLORIDE SERPL-SCNC: 102 MMOL/L
CO2 SERPL-SCNC: 26 MMOL/L
CREAT SERPL-MCNC: 0.6 MG/DL
EGFR: 96 ML/MIN/1.73M2
GLUCOSE SERPL-MCNC: 168 MG/DL
HCT VFR BLD CALC: 37.3 %
HGB BLD-MCNC: 12.2 G/DL
LYMPHOCYTES # BLD AUTO: 1.4 K/UL
LYMPHOCYTES NFR BLD AUTO: 25 %
MAN DIFF?: NO
MCHC RBC-ENTMCNC: 28.8 PG
MCHC RBC-ENTMCNC: 32.7 GM/DL
MCV RBC AUTO: 88.2 FL
NEUTROPHILS # BLD AUTO: 3.8 K/UL
NEUTROPHILS NFR BLD AUTO: 69.1 %
PLATELET # BLD AUTO: 236 K/UL
POTASSIUM SERPL-SCNC: 4 MMOL/L
PROT SERPL-MCNC: 7.3 G/DL
RBC # BLD: 4.23 M/UL
RBC # FLD: 14.2 %
SODIUM SERPL-SCNC: 136 MMOL/L
TSH SERPL-ACNC: 4.29 UIU/ML
WBC # FLD AUTO: 5.5 K/UL

## 2024-08-16 PROCEDURE — 99214 OFFICE O/P EST MOD 30 MIN: CPT

## 2024-08-16 PROCEDURE — 36415 COLL VENOUS BLD VENIPUNCTURE: CPT

## 2024-08-19 NOTE — HISTORY OF PRESENT ILLNESS
[de-identified] : Sandra Alston is a 69 year old  referred by Dr. Moulton for evaluation for urothelial carcinoma involving bladder and urethra. Here today for f/u and tx.   Oncologic history: 1.  urothelial carcinoma involving bladder and urethra --presented with hematuria and urinary retention --CT scan urogram 3/8/23 showed a 4.5 cm thick walled, rim enhancing mass with necrosis at the level of the urethra extending close to the L ureteral orifice.  No lymphadenopathy.  no visceral metastatic disease --cystoscopy 3/9/23 showed a flat solid appearing tumor at the L trigone and Lateral wall of the bladder extending into the urethra --TURBT 3/22/23:  biopsies from L bladder neck, L trigone, anterior bladder neck, and bladder neck all showing high grade, muscle invasive urothelial carcinoma. --chest CT 4/14/23 negative for thoracic metastatic disease --neoadjuvant chemotherapy with cisplatin and gemcitabine x 4 cycles 4/27/23 - 7/6/23 --CT c/a/p 7/2023 after completing NACT showed partial response, no interval development of metastatic disease --s/p robotic anterior exenteration, PLND with ileal conduit formation 9/5/23 (Dr Keller). Pathology: pT3a, high grade urothelial carcinoma with glandular and mucinous features, infiltrates perivesical adipose tissue and urethra.  Urethral margin positive for invasive carcinoma.  0/10 LN involved.  [id number] --initiated adjuvant immunotherapy with Nivolumab on 10/23/23 --RT to surgical bed for positive urethral margin 1/30/24 - 3/29/2 --CT chest and abdomen/pelvis urogram on 7/9/24 with no local or metastatic spread of tumor.  PMH also notable for MGUS - no records - follows with Dr Brown on [place].  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 , lives on [place] [de-identified] : Here for f/u with Nivolumab.   +occasional headaches, takes OTC meds with some relief, using CPAP at night for sleep apnea  +occasional mild joint pain,   + fatigue, manageable  trying to lose weight, exercise regularly, weight stable. overall she is doing well with ongoing anxiety related to her bladder cancer.    denies N/V/D, skin rash, cough, SOB. No any sites of pain

## 2024-08-19 NOTE — ASSESSMENT
[FreeTextEntry1] : Sandra Alston is a 69 year old female who presented with gross hematuria and urinary retention. CT urogram showed a 4 cm tumor in the bladder and extending into the mid urethra. No lymphadenopathy or distant metastatic disease on CT c/a/p. 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 Completed neoadjuvant chemotherapy with gemcitabine and cisplatin 4/27/23- 7/6/23.  S/p robotic anterior exenteration, pelvic LN dissection with ileal conduit formation on 9/5/23 (Dr Keller). Pathology showed residual high grade urothelial carcinoma with glandular and mucinous features, infiltrates perivesical adipose tissue and urethra.  Urethral margin positive for invasive carcinoma.  0/10 LN involved.  [id number].  Initiated adjuvant immunotherapy with Nivolumab on 10/23/23.  RT to surgical bed for positive urethral margin 1/30/24 - 3/29/24.  Plan: #. 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  She had extensive residual disease after neoadjuvant chemotherapy and a positive surgical margin.  High risk for recurrence/ metastasis and needs to be monitored carefully. --recommended adjuvant treatment w/ nivolumab x 1 year. Initiated nivolumab on 10/23/23, tolerating treatment well without any dose-limiting checkpoint inhibitor toxicities. Labs reviewed and ok to proceed with Nivolumab today. No concerns at this visit, she is doing well, continue Nivolumab  every 4 weeks, next treatment will be the last one.  --repeated surveillance CT C/A/P on 7/9 showing no local or metastatic spread of tumor. Defer further scans to Dr. Moulton.  --B12 annually for urostomy/ileal conduit, checked 5/2024 and normal.  #. MGUS --checked labs 4/2024. M spike detectable but not measurable.  reassurance provided.  can monitor annually.  #.  anxiety due to cancer --discussed coping strategies.  Offered to connect her w/ SW re: support groups.  Also reminded her of availability of the psycho-oncologist for counseling.  --she accepted psycho-oncologist referral at this visit.  Referral placed in  #  abnormal thyroid function tests --TSH fluctuates, went down from 7.84 to 4.29 today, free T4 normal, --will monitor and repeat next time, if persists, will consider starting low dose levothyroxine given symptoms including weight gain and ongoing fatigue    #. health maintenance --no need for pap smear given h/o radical hysterectomy on 9/5/23 --last mammogram in March 2022, should proceed w/ mammogram this year if she remains cancer-free. --exercise, healthy diet recommended for unintentional weight gain.    --nutrition referral for weight gain  RTC with last nivolumab 480mg on 9/19/24 labs- CBC, CMP, TSH/fT4. +/- PT/PTT

## 2024-08-19 NOTE — PHYSICAL EXAM
[Restricted in physically strenuous activity but ambulatory and able to carry out work of a light or sedentary nature] : Status 1- Restricted in physically strenuous activity but ambulatory and able to carry out work of a light or sedentary nature, e.g., light house work, office work [Normal] : affect appropriate [Ulcers] : no ulcers [Mucositis] : no mucositis [Thrush] : no thrush [Vesicles] : no vesicles [de-identified] : chemoport in place, no sign of infection.  no LE edema. [de-identified] : not distended, soft,  non tender.  [de-identified] : +ileal conduit

## 2024-08-19 NOTE — PHYSICAL EXAM
[Restricted in physically strenuous activity but ambulatory and able to carry out work of a light or sedentary nature] : Status 1- Restricted in physically strenuous activity but ambulatory and able to carry out work of a light or sedentary nature, e.g., light house work, office work [Normal] : affect appropriate [Ulcers] : no ulcers [Mucositis] : no mucositis [Thrush] : no thrush [Vesicles] : no vesicles [de-identified] : chemoport in place, no sign of infection.  no LE edema. [de-identified] : not distended, soft,  non tender.  [de-identified] : +ileal conduit

## 2024-08-19 NOTE — HISTORY OF PRESENT ILLNESS
[de-identified] : Sandra Alston is a 69 year old  referred by Dr. Moulton for evaluation for urothelial carcinoma involving bladder and urethra. Here today for f/u and tx.   Oncologic history: 1.  urothelial carcinoma involving bladder and urethra --presented with hematuria and urinary retention --CT scan urogram 3/8/23 showed a 4.5 cm thick walled, rim enhancing mass with necrosis at the level of the urethra extending close to the L ureteral orifice.  No lymphadenopathy.  no visceral metastatic disease --cystoscopy 3/9/23 showed a flat solid appearing tumor at the L trigone and Lateral wall of the bladder extending into the urethra --TURBT 3/22/23:  biopsies from L bladder neck, L trigone, anterior bladder neck, and bladder neck all showing high grade, muscle invasive urothelial carcinoma. --chest CT 4/14/23 negative for thoracic metastatic disease --neoadjuvant chemotherapy with cisplatin and gemcitabine x 4 cycles 4/27/23 - 7/6/23 --CT c/a/p 7/2023 after completing NACT showed partial response, no interval development of metastatic disease --s/p robotic anterior exenteration, PLND with ileal conduit formation 9/5/23 (Dr Keller). Pathology: pT3a, high grade urothelial carcinoma with glandular and mucinous features, infiltrates perivesical adipose tissue and urethra.  Urethral margin positive for invasive carcinoma.  0/10 LN involved.  [id number] --initiated adjuvant immunotherapy with Nivolumab on 10/23/23 --RT to surgical bed for positive urethral margin 1/30/24 - 3/29/2 --CT chest and abdomen/pelvis urogram on 7/9/24 with no local or metastatic spread of tumor.  PMH also notable for MGUS - no records - follows with Dr Brown on [place].  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   +occasional headaches, takes OTC meds with some relief, using CPAP at night for sleep apnea  +occasional mild joint pain,   + fatigue, manageable  trying to lose weight, exercise regularly, weight stable. overall she is doing well with ongoing anxiety related to her bladder cancer.    denies N/V/D, skin rash, cough, SOB. No any sites of pain

## 2024-08-19 NOTE — ASSESSMENT
[FreeTextEntry1] : Sandra Alston is a 69 year old female who presented with gross hematuria and urinary retention. CT urogram showed a 4 cm tumor in the bladder and extending into the mid urethra. No lymphadenopathy or distant metastatic disease on CT c/a/p. 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 Completed neoadjuvant chemotherapy with gemcitabine and cisplatin 4/27/23- 7/6/23.  S/p robotic anterior exenteration, pelvic LN dissection with ileal conduit formation on 9/5/23 (Dr Keller). Pathology showed residual high grade urothelial carcinoma with glandular and mucinous features, infiltrates perivesical adipose tissue and urethra.  Urethral margin positive for invasive carcinoma.  0/10 LN involved.  [id number].  Initiated adjuvant immunotherapy with Nivolumab on 10/23/23.  RT to surgical bed for positive urethral margin 1/30/24 - 3/29/24.  Plan: #. 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  She had extensive residual disease after neoadjuvant chemotherapy and a positive surgical margin.  High risk for recurrence/ metastasis and needs to be monitored carefully. --recommended adjuvant treatment w/ nivolumab x 1 year. Initiated nivolumab on 10/23/23, tolerating treatment well without any dose-limiting checkpoint inhibitor toxicities. Labs reviewed and ok to proceed with Nivolumab today. No concerns at this visit, she is doing well, continue Nivolumab  every 4 weeks, next treatment will be the last one.  --repeated surveillance CT C/A/P on 7/9 showing no local or metastatic spread of tumor. Defer further scans to Dr. Moulton.  --B12 annually for urostomy/ileal conduit, checked 5/2024 and normal.  #. MGUS --checked labs 4/2024. M spike detectable but not measurable.  reassurance provided.  can monitor annually.  #.  anxiety due to cancer --discussed coping strategies.  Offered to connect her w/ SW re: support groups.  Also reminded her of availability of the psycho-oncologist for counseling.  --she accepted psycho-oncologist referral at this visit.  Referral placed in  #  abnormal thyroid function tests --TSH fluctuates, went down from 7.84 to 4.29 today, free T4 normal, --will monitor and repeat next time, if persists, will consider starting low dose levothyroxine given symptoms including weight gain and ongoing fatigue    #. health maintenance --no need for pap smear given h/o radical hysterectomy on 9/5/23 --last mammogram in March 2022, should proceed w/ mammogram this year if she remains cancer-free. --exercise, healthy diet recommended for unintentional weight gain.    --nutrition referral for weight gain  RTC with last nivolumab 480mg on 9/19/24 labs- CBC, CMP, TSH/fT4. +/- PT/PTT

## 2024-08-22 ENCOUNTER — APPOINTMENT (OUTPATIENT)
Dept: SLEEP CENTER | Facility: HOSPITAL | Age: 69
End: 2024-08-22

## 2024-08-22 PROCEDURE — 95800 SLP STDY UNATTENDED: CPT | Mod: 26

## 2024-08-23 ENCOUNTER — NON-APPOINTMENT (OUTPATIENT)
Age: 69
End: 2024-08-23

## 2024-09-16 ENCOUNTER — APPOINTMENT (OUTPATIENT)
Dept: HEMATOLOGY ONCOLOGY | Facility: CLINIC | Age: 69
End: 2024-09-16
Payer: MEDICARE

## 2024-09-16 PROCEDURE — 90791 PSYCH DIAGNOSTIC EVALUATION: CPT | Mod: 95

## 2024-09-19 ENCOUNTER — APPOINTMENT (OUTPATIENT)
Dept: RADIATION ONCOLOGY | Facility: CLINIC | Age: 69
End: 2024-09-19
Payer: MEDICARE

## 2024-09-19 VITALS
WEIGHT: 163 LBS | OXYGEN SATURATION: 98 % | RESPIRATION RATE: 18 BRPM | BODY MASS INDEX: 28.88 KG/M2 | TEMPERATURE: 97 F | HEIGHT: 63 IN | HEART RATE: 73 BPM | DIASTOLIC BLOOD PRESSURE: 90 MMHG | SYSTOLIC BLOOD PRESSURE: 160 MMHG

## 2024-09-19 PROCEDURE — 99213 OFFICE O/P EST LOW 20 MIN: CPT

## 2024-09-19 PROCEDURE — G2211 COMPLEX E/M VISIT ADD ON: CPT

## 2024-09-19 NOTE — HISTORY OF PRESENT ILLNESS
[FreeTextEntry1] : Sandra Willis is a 69-year-old female with PMH HTN, MGUS Family history of bone Ca (father) being followed by Dr. Keller who had presented with gross hematuria and urinary retention in 4/2023 and underwent CT scan urogram that revealed a 4.4cm thick walled, rim enhancing mass with necrosis at the level of the urethra extending close to the left ureteral orifice. No lymphadenopathy, no visceral metastatic disease. On 3/22/2023 underwent TURBT- biopsies from L bladder neck, L trigone, anterior bladder neck and bladder neck all showing high grade, muscle invasive urothelial carcinoma.  She currently has completed chemotherapy/immunotherapy and Rt therapy which was completed 3/29/2024 6480 cGy radiation to the Pelvis/Bladder on 3/29/2024. She tolerated the radiation well without and severe side effects. KPS status remained the same.    Ms. Alston received neoadjuvant chemotherapy with cisplatin and gemcitabine X4 cycles (4/27/2023- 7/6/2023) July 2023 CT C/A/P after completing NACT showed partial response, no interval development of metastatic disease. On 9/5/2023 pt. underwent Robotic anterior exenteration, PLND with ileal conduit formation with Dr. Keller. Pathology pT3a, high grade urothelial carcinoma with glandular and mucinous features, infiltrates tatiana vesical adipose tissue and urethra. Urethral margin positive for invasive high-grade urothelial carcinoma. 0/10 LN involved. [id number], stage IIIA On 10/23/2023, initiated adjuvant immunotherapy with Nivolumab 240mg Q 2 weeks  Ms. Alston is here today for follow up visit. Overall, she is doing well. She has a good appetite and experiences some fatigue where she will take rest periods. She has Ile conduit in which the stoma site is clean Clear yellow urine. No skin irritation noted around the stoma or vaginal area. She denies any dysuria or hematuria. Bowel movements are normal but at times she has constipation. She is taking Metamucil daily but advised to take bid if she does experience constipation. Denies any blood stool or pain.  She will continue to follow up with Hem Onc . Provided her with vaginal dilator and reviewed the instructions with the patient and her .

## 2024-09-19 NOTE — HISTORY OF PRESENT ILLNESS
[FreeTextEntry1] : Sandra Willis is a 69-year-old female with PMH HTN, MGUS Family history of bone Ca (father) being followed by Dr. Keller who had presented with gross hematuria and urinary retention in 4/2023 and underwent CT scan urogram that revealed a 4.4cm thick walled, rim enhancing mass with necrosis at the level of the urethra extending close to the left ureteral orifice. No lymphadenopathy, no visceral metastatic disease. On 3/22/2023 underwent TURBT- biopsies from L bladder neck, L trigone, anterior bladder neck and bladder neck all showing high grade, muscle invasive urothelial carcinoma.  She currently has completed chemotherapy/immunotherapy and Rt therapy which was completed 3/29/2024 6480 cGy radiation to the Pelvis/Bladder on 3/29/2024. She tolerated the radiation well without and severe side effects. KPS status remained the same.    Ms. Alston received neoadjuvant chemotherapy with cisplatin and gemcitabine X4 cycles (4/27/2023- 7/6/2023) July 2023 CT C/A/P after completing NACT showed partial response, no interval development of metastatic disease. On 9/5/2023 pt. underwent Robotic anterior exenteration, PLND with ileal conduit formation with Dr. Keller. Pathology pT3a, high grade urothelial carcinoma with glandular and mucinous features, infiltrates [place] vesical adipose tissue and urethra. Urethral margin positive for invasive high-grade urothelial carcinoma. 0/10 LN involved. [id number], stage IIIA On 10/23/2023, initiated adjuvant immunotherapy with Nivolumab 240mg Q 2 weeks  Ms. Alston is here today for follow up visit. Overall, she is doing well. She has a good appetite and experiences some fatigue where she will take rest periods. She has Ile conduit in which the stoma site is clean Clear yellow urine. No skin irritation noted around the stoma or vaginal area. She denies any dysuria or hematuria. Bowel movements are normal but at times she has constipation. She is taking Metamucil daily but advised to take bid if she does experience constipation. Denies any blood stool or pain.  She will continue to follow up with Hem Onc . Provided her with vaginal dilator and reviewed the instructions with the patient and her .

## 2024-09-19 NOTE — VITALS
[Maximal Pain Intensity: 1/10] : 1/10 [Least Pain Intensity: 0/10] : 0/10 [90: Able to carry normal activity; minor signs or symptoms of disease.] : 90: Able to carry normal activity; minor signs or symptoms of disease.  [ECOG Performance Status: 0 - Fully active, able to carry on all pre-disease performance without restriction] : Performance Status: 0 - Fully active, able to carry on all pre-disease performance without restriction

## 2024-09-19 NOTE — PHYSICAL EXAM
[Normal] : normal spine exam without palpable tenderness, no kyphosis or scoliosis [de-identified] : ileal conduit stoma pink clear yellow urine

## 2024-09-19 NOTE — HISTORY OF PRESENT ILLNESS
[FreeTextEntry1] : Sandra Willis is a 69-year-old female with PMH HTN, MGUS Family history of bone Ca (father) being followed by Dr. Keller who had presented with gross hematuria and urinary retention in 4/2023 and underwent CT scan urogram that revealed a 4.4cm thick walled, rim enhancing mass with necrosis at the level of the urethra extending close to the left ureteral orifice. No lymphadenopathy, no visceral metastatic disease. On 3/22/2023 underwent TURBT- biopsies from L bladder neck, L trigone, anterior bladder neck and bladder neck all showing high grade, muscle invasive urothelial carcinoma.  She currently has completed chemotherapy/immunotherapy and Rt therapy which was completed 3/29/2024 6480 cGy radiation to the Pelvis/Bladder on 3/29/2024. She tolerated the radiation well without and severe side effects. KPS status remained the same.    Ms. Alston received neoadjuvant chemotherapy with cisplatin and gemcitabine X4 cycles (4/27/2023- 7/6/2023) July 2023 CT C/A/P after completing NACT showed partial response, no interval development of metastatic disease. On 9/5/2023 pt. underwent Robotic anterior exenteration, PLND with ileal conduit formation with Dr. Keller. Pathology pT3a, high grade urothelial carcinoma with glandular and mucinous features, infiltrates tatiana vesical adipose tissue and urethra. Urethral margin positive for invasive high-grade urothelial carcinoma. 0/10 LN involved. [id number], stage IIIA On 10/23/2023, initiated adjuvant immunotherapy with Nivolumab 240mg Q 2 weeks  Ms. Alston is here today for follow up visit. Overall, she is doing well. She has a good appetite and experiences some fatigue where she will take rest periods. She has Ile conduit in which the stoma site is clean Clear yellow urine. No skin irritation noted around the stoma or vaginal area. She denies any dysuria or hematuria. Bowel movements are normal but at times she has constipation. She is taking Metamucil daily but advised to take bid if she does experience constipation. Denies any blood stool or pain.  She will continue to follow up with Hem Onc . Provided her with vaginal dilator and reviewed the instructions with the patient and her .

## 2024-09-19 NOTE — REVIEW OF SYSTEMS
[Negative] : Allergic/Immunologic [Anal Pain: Grade 0] : Anal Pain: Grade 0 [Constipation: Grade 0] : Constipation: Grade 0 [Diarrhea: Grade 0] : Diarrhea: Grade 0 [Dyspepsia: Grade 0] : Dyspepsia: Grade 0 [Dysphagia: Grade 0] : Dysphagia: Grade 0 [Esophagitis: Grade 0] : Esophagitis: Grade 0 [Fecal Incontinence: Grade 0] : Fecal Incontinence: Grade 0 [Gastroparesis: Grade 0] : Gastroparesis: Grade 0 [Nausea: Grade 0] : Nausea: Grade 0 [Proctitis: Grade 0] : Proctitis: Grade 0 [Rectal Pain: Grade 0] : Rectal Pain: Grade 0 [Small Intestinal Obstruction: Grade 0] : Small Intestinal Obstruction: Grade 0 [Vomiting: Grade 0] : Vomiting: Grade 0 [Hematuria: Grade 0] : Hematuria: Grade 0 [Urinary Incontinence: Grade 0] : Urinary Incontinence: Grade 0  [Urinary Retention: Grade 0] : Urinary Retention: Grade 0 [Urinary Tract Pain: Grade 0] : Urinary Tract Pain: Grade 0 [Urinary Urgency: Grade 0] : Urinary Urgency: Grade 0 [Vaginal Stricture: Grade 0] : Vaginal Stricture: Grade 0 [Vaginal Infection: Grade 0] : Vaginal Infection: Grade 0  [Urinary Frequency] : urinary frequency [Vomiting] : no vomiting [Vaginal Discharge] : no vaginal discharge [FreeTextEntry7] : ileal conduit stoma is pink

## 2024-09-19 NOTE — PHYSICAL EXAM
[Normal] : normal spine exam without palpable tenderness, no kyphosis or scoliosis [de-identified] : ileal conduit stoma pink clear yellow urine

## 2024-09-19 NOTE — HISTORY OF PRESENT ILLNESS
[FreeTextEntry1] : Sandra Willis is a 69-year-old female with PMH HTN, MGUS Family history of bone Ca (father) being followed by Dr. Keller who had presented with gross hematuria and urinary retention in 4/2023 and underwent CT scan urogram that revealed a 4.4cm thick walled, rim enhancing mass with necrosis at the level of the urethra extending close to the left ureteral orifice. No lymphadenopathy, no visceral metastatic disease. On 3/22/2023 underwent TURBT- biopsies from L bladder neck, L trigone, anterior bladder neck and bladder neck all showing high grade, muscle invasive urothelial carcinoma.  She currently has completed chemotherapy/immunotherapy and Rt therapy which was completed 3/29/2024 6480 cGy radiation to the Pelvis/Bladder on 3/29/2024. She tolerated the radiation well without and severe side effects. KPS status remained the same.    Ms. Alston received neoadjuvant chemotherapy with cisplatin and gemcitabine X4 cycles (4/27/2023- 7/6/2023) July 2023 CT C/A/P after completing NACT showed partial response, no interval development of metastatic disease. On 9/5/2023 pt. underwent Robotic anterior exenteration, PLND with ileal conduit formation with Dr. Keller. Pathology pT3a, high grade urothelial carcinoma with glandular and mucinous features, infiltrates tatiana vesical adipose tissue and urethra. Urethral margin positive for invasive high-grade urothelial carcinoma. 0/10 LN involved. [id number], stage IIIA On 10/23/2023, initiated adjuvant immunotherapy with Nivolumab 240mg Q 2 weeks  Ms. Alston is here today for follow up visit. Overall, she is doing well. She has a good appetite and experiences some fatigue where she will take rest periods. She has Ile conduit in which the stoma site is clean Clear yellow urine. No skin irritation noted around the stoma or vaginal area. She denies any dysuria or hematuria. Bowel movements are normal but at times she has constipation. She is taking Metamucil daily but advised to take bid if she does experience constipation. Denies any blood stool or pain.  She will continue to follow up with Hem Onc . Provided her with vaginal dilator and reviewed the instructions with the patient and her .

## 2024-09-19 NOTE — PHYSICAL EXAM
[Normal] : normal spine exam without palpable tenderness, no kyphosis or scoliosis [de-identified] : ileal conduit stoma pink clear yellow urine

## 2024-09-19 NOTE — PHYSICAL EXAM
[Normal] : normal spine exam without palpable tenderness, no kyphosis or scoliosis [de-identified] : ileal conduit stoma pink clear yellow urine

## 2024-09-20 ENCOUNTER — OUTPATIENT (OUTPATIENT)
Dept: OUTPATIENT SERVICES | Facility: HOSPITAL | Age: 69
LOS: 1 days | End: 2024-09-20
Payer: MEDICARE

## 2024-09-20 ENCOUNTER — APPOINTMENT (OUTPATIENT)
Dept: HEMATOLOGY ONCOLOGY | Facility: CLINIC | Age: 69
End: 2024-09-20
Payer: MEDICARE

## 2024-09-20 ENCOUNTER — APPOINTMENT (OUTPATIENT)
Dept: INFUSION THERAPY | Facility: CLINIC | Age: 69
End: 2024-09-20

## 2024-09-20 VITALS
DIASTOLIC BLOOD PRESSURE: 74 MMHG | TEMPERATURE: 98 F | RESPIRATION RATE: 15 BRPM | WEIGHT: 166.01 LBS | OXYGEN SATURATION: 99 % | HEIGHT: 63 IN | HEART RATE: 76 BPM | SYSTOLIC BLOOD PRESSURE: 145 MMHG

## 2024-09-20 VITALS
WEIGHT: 164 LBS | BODY MASS INDEX: 29.06 KG/M2 | RESPIRATION RATE: 18 BRPM | SYSTOLIC BLOOD PRESSURE: 166 MMHG | HEIGHT: 63 IN | DIASTOLIC BLOOD PRESSURE: 96 MMHG | HEART RATE: 88 BPM | TEMPERATURE: 98.1 F | OXYGEN SATURATION: 97 %

## 2024-09-20 DIAGNOSIS — R51.9 HEADACHE, UNSPECIFIED: ICD-10-CM

## 2024-09-20 DIAGNOSIS — F41.9 ANXIETY DISORDER, UNSPECIFIED: ICD-10-CM

## 2024-09-20 DIAGNOSIS — Z98.890 OTHER SPECIFIED POSTPROCEDURAL STATES: Chronic | ICD-10-CM

## 2024-09-20 DIAGNOSIS — C67.9 MALIGNANT NEOPLASM OF BLADDER, UNSPECIFIED: ICD-10-CM

## 2024-09-20 DIAGNOSIS — D47.2 MONOCLONAL GAMMOPATHY: ICD-10-CM

## 2024-09-20 DIAGNOSIS — N75.0 CYST OF BARTHOLIN'S GLAND: Chronic | ICD-10-CM

## 2024-09-20 DIAGNOSIS — Z90.79 ACQUIRED ABSENCE OF OTHER GENITAL ORGAN(S): Chronic | ICD-10-CM

## 2024-09-20 PROCEDURE — 96413 CHEMO IV INFUSION 1 HR: CPT

## 2024-09-20 PROCEDURE — 99214 OFFICE O/P EST MOD 30 MIN: CPT | Mod: 25

## 2024-09-20 PROCEDURE — 36415 COLL VENOUS BLD VENIPUNCTURE: CPT

## 2024-09-20 RX ORDER — SODIUM CHLORIDE 9 MG/ML
10 INJECTION INTRAMUSCULAR; INTRAVENOUS; SUBCUTANEOUS ONCE
Refills: 0 | Status: COMPLETED | OUTPATIENT
Start: 2024-09-20 | End: 2024-09-20

## 2024-09-20 RX ORDER — NIVOLUMAB 10 MG/ML
480 INJECTION INTRAVENOUS ONCE
Refills: 0 | Status: COMPLETED | OUTPATIENT
Start: 2024-09-20 | End: 2024-09-20

## 2024-09-20 RX ADMIN — NIVOLUMAB 480 MILLIGRAM(S): 10 INJECTION INTRAVENOUS at 17:00

## 2024-09-20 RX ADMIN — NIVOLUMAB 480 MILLIGRAM(S): 10 INJECTION INTRAVENOUS at 16:06

## 2024-09-20 RX ADMIN — SODIUM CHLORIDE 10 MILLILITER(S): 9 INJECTION INTRAMUSCULAR; INTRAVENOUS; SUBCUTANEOUS at 17:20

## 2024-09-23 PROBLEM — R51.9 HEADACHE: Status: ACTIVE | Noted: 2024-09-23

## 2024-09-23 LAB
ALBUMIN SERPL ELPH-MCNC: 3.6 G/DL
ALP BLD-CCNC: 72 U/L
ALT SERPL-CCNC: 19 U/L
ANION GAP SERPL CALC-SCNC: 1 MMOL/L
APTT BLD: 27.2 SEC
AST SERPL-CCNC: 30 U/L
BILIRUB SERPL-MCNC: 0.5 MG/DL
BUN SERPL-MCNC: 7 MG/DL
CALCIUM SERPL-MCNC: 9.4 MG/DL
CHLORIDE SERPL-SCNC: 105 MMOL/L
CO2 SERPL-SCNC: 27 MMOL/L
CREAT SERPL-MCNC: 0.9 MG/DL
EGFR: 68 ML/MIN/1.73M2
GLUCOSE SERPL-MCNC: 126 MG/DL
HCT VFR BLD CALC: 34 %
HGB BLD-MCNC: 11 G/DL
INR PPP: 1.06
LYMPHOCYTES # BLD AUTO: 1.3 K/UL
LYMPHOCYTES NFR BLD AUTO: 20.3 %
MAN DIFF?: NO
MCHC RBC-ENTMCNC: 28.4 PG
MCHC RBC-ENTMCNC: 32.4 GM/DL
MCV RBC AUTO: 87.9 FL
NEUTROPHILS # BLD AUTO: 5 K/UL
NEUTROPHILS NFR BLD AUTO: 75.3 %
PLATELET # BLD AUTO: 217 K/UL
POTASSIUM SERPL-SCNC: 4.3 MMOL/L
PROT SERPL-MCNC: 6.5 G/DL
PT BLD: 12.1 SEC
RBC # BLD: 3.87 M/UL
RBC # FLD: 14.2 %
SODIUM SERPL-SCNC: 133 MMOL/L
TSH SERPL-ACNC: 3.04 UIU/ML
WBC # FLD AUTO: 6.6 K/UL

## 2024-09-23 NOTE — HISTORY OF PRESENT ILLNESS
[de-identified] : Sandra Alston is a 69 year old  referred by Dr. Moulton for evaluation for urothelial carcinoma involving bladder and urethra.   Oncologic history: 1.  urothelial carcinoma involving bladder and urethra --presented with hematuria and urinary retention --CT scan urogram 3/8/23 showed a 4.5 cm thick walled, rim enhancing mass with necrosis at the level of the urethra extending close to the L ureteral orifice.  No lymphadenopathy.  no visceral metastatic disease --cystoscopy 3/9/23 showed a flat solid appearing tumor at the L trigone and Lateral wall of the bladder extending into the urethra --TURBT 3/22/23:  biopsies from L bladder neck, L trigone, anterior bladder neck, and bladder neck all showing high grade, muscle invasive urothelial carcinoma. --chest CT 4/14/23 negative for thoracic metastatic disease --neoadjuvant chemotherapy with cisplatin and gemcitabine x 4 cycles 4/27/23 - 7/6/23 --CT c/a/p 7/2023 after completing NACT showed partial response, no interval development of metastatic disease --s/p robotic anterior exenteration, PLND with ileal conduit formation 9/5/23 (Dr Keller). Pathology: pT3a, high grade urothelial carcinoma with glandular and mucinous features, infiltrates perivesical adipose tissue and urethra.  Urethral margin positive for invasive carcinoma.  0/10 LN involved.  [id number] --initiated adjuvant immunotherapy with Nivolumab on 10/23/23 and completed 1 year of treatment on 9/20/24 --RT to surgical bed for positive urethral margin 1/30/24 - 3/29/2 --CT chest and abdomen/pelvis urogram on 7/9/24 with no local or metastatic spread of tumor.  PMH also notable for MGUS - no records - follows with Dr Brown on [place].  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 , lives on [place] [de-identified] : Here for f/u with the last Nivolumab.   + headache daily in the morning, getting worse. takes extra strength Tylenol daily w/ some help, she used to have worsening headache when she had sinusitis in the past, using CPAP at night for sleep apnea  +chronic fatigue, manageable, exercise regularly, weight stable. she saw Dr. Lopes and the visit was really helpful for her anxiety.  otherwise she is feeling good, denies N/V/D, skin rash, cough, SOB. No any sites of pain. No blurry vision or weakness, balance is good.

## 2024-09-23 NOTE — PHYSICAL EXAM
[Restricted in physically strenuous activity but ambulatory and able to carry out work of a light or sedentary nature] : Status 1- Restricted in physically strenuous activity but ambulatory and able to carry out work of a light or sedentary nature, e.g., light house work, office work [Normal] : affect appropriate [Ulcers] : no ulcers [Mucositis] : no mucositis [Thrush] : no thrush [Vesicles] : no vesicles [de-identified] : chemoport in place, no sign of infection.  no LE edema. [de-identified] : not distended, soft,  non tender.  [de-identified] : +ileal conduit

## 2024-09-23 NOTE — DISCHARGE NOTE PROVIDER - NSDCACTIVITY_GEN_ALL_CORE
Bed/Stretcher in lowest position, wheels locked, appropriate side rails in place/Call bell, personal items and telephone in reach/Instruct patient to call for assistance before getting out of bed/chair/stretcher/Non-slip footwear applied when patient is off stretcher/[place] to call system/Physically safe environment - no spills, clutter or unnecessary equipment/Purposeful proactive rounding/Room/bathroom lighting operational, light cord in reach No heavy lifting/straining

## 2024-09-23 NOTE — ASSESSMENT
[FreeTextEntry1] : Sandra Alston is a 69 year old female who presented with gross hematuria and urinary retention. CT urogram showed a 4 cm tumor in the bladder and extending into the mid urethra. No lymphadenopathy or distant metastatic disease on CT c/a/p. 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 Completed neoadjuvant chemotherapy with gemcitabine and cisplatin 4/27/23- 7/6/23.  S/p robotic anterior exenteration, pelvic LN dissection with ileal conduit formation on 9/5/23 (Dr Keller). Pathology showed residual high grade urothelial carcinoma with glandular and mucinous features, infiltrates perivesical adipose tissue and urethra.  Urethral margin positive for invasive carcinoma.  0/10 LN involved.  [id number].  Initiated adjuvant immunotherapy with Nivolumab on 10/23/23.  RT to surgical bed for positive urethral margin 1/30/24 - 3/29/24.  Plan: #. 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  She had extensive residual disease after neoadjuvant chemotherapy and a positive surgical margin.  High risk for recurrence/ metastasis and needs to be monitored carefully. --recommended adjuvant treatment w/ nivolumab x 1 year. Initiated nivolumab on 10/23/23, tolerating treatment well without any dose-limiting checkpoint inhibitor toxicities. Labs reviewed and ok to proceed with the last Nivolumab today, total 1 year of Nivolumab.  --repeated surveillance CT C/A/P on 7/9 showing no local or metastatic spread of tumor. Will repeat CT chest and CT abdomen/pelvis urogram in January, scheduled for 1/10/24. Continue to follow up with Dr. Chan  --B12 annually for urostomy/ileal conduit, checked 5/2024 and normal. --will see her with next CT scans and consider requesting a port removal at next visit.  #. MGUS --checked labs 4/2024. M spike detectable but not measurable.  reassurance provided.  can monitor annually.  #. anxiety due to cancer --saw Dr. Lopes psycho-oncologist and was really helpful for her anxiety, continue f/u.   # chronic headache --likely from sinusitis or using CPAP at night for sleep apnea?  --no neurological symptoms.  --recommend ENT f/u, information provided  #  abnormal thyroid function tests --TSH fluctuates, normal today.  --will monitor and repeat next time, if persists, will consider starting low dose levothyroxine given symptoms including weight gain and ongoing fatigue    #. health maintenance --no need for pap smear given h/o radical hysterectomy on 9/5/23 --last mammogram in March 2022, should proceed w/ mammogram this year if she remains cancer-free. --exercise, healthy diet recommended for unintentional weight gain.    --age appropriate vaccines per PCP  plan discussed with Dr. Piedra RTC after CT scans in mid January 2025 port flush + CBC, CMP, TSH/fT4, PT/PTT

## 2024-09-23 NOTE — PHYSICAL EXAM
[Restricted in physically strenuous activity but ambulatory and able to carry out work of a light or sedentary nature] : Status 1- Restricted in physically strenuous activity but ambulatory and able to carry out work of a light or sedentary nature, e.g., light house work, office work [Normal] : affect appropriate [Ulcers] : no ulcers [Mucositis] : no mucositis [Thrush] : no thrush [Vesicles] : no vesicles [de-identified] : chemoport in place, no sign of infection.  no LE edema. [de-identified] : not distended, soft,  non tender.  [de-identified] : +ileal conduit

## 2024-09-23 NOTE — ASSESSMENT
[FreeTextEntry1] : Sandra Alston is a 69 year old female who presented with gross hematuria and urinary retention. CT urogram showed a 4 cm tumor in the bladder and extending into the mid urethra. No lymphadenopathy or distant metastatic disease on CT c/a/p. 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 Completed neoadjuvant chemotherapy with gemcitabine and cisplatin 4/27/23- 7/6/23.  S/p robotic anterior exenteration, pelvic LN dissection with ileal conduit formation on 9/5/23 (Dr Keller). Pathology showed residual high grade urothelial carcinoma with glandular and mucinous features, infiltrates perivesical adipose tissue and urethra.  Urethral margin positive for invasive carcinoma.  0/10 LN involved.  [id number].  Initiated adjuvant immunotherapy with Nivolumab on 10/23/23.  RT to surgical bed for positive urethral margin 1/30/24 - 3/29/24.  Plan: #. 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  She had extensive residual disease after neoadjuvant chemotherapy and a positive surgical margin.  High risk for recurrence/ metastasis and needs to be monitored carefully. --recommended adjuvant treatment w/ nivolumab x 1 year. Initiated nivolumab on 10/23/23, tolerating treatment well without any dose-limiting checkpoint inhibitor toxicities. Labs reviewed and ok to proceed with the last Nivolumab today, total 1 year of Nivolumab.  --repeated surveillance CT C/A/P on 7/9 showing no local or metastatic spread of tumor. Will repeat CT chest and CT abdomen/pelvis urogram in January, scheduled for 1/10/24. Continue to follow up with Dr. Chan  --B12 annually for urostomy/ileal conduit, checked 5/2024 and normal. --will see her with next CT scans and consider requesting a port removal at next visit.  #. MGUS --checked labs 4/2024. M spike detectable but not measurable.  reassurance provided.  can monitor annually.  #. anxiety due to cancer --saw Dr. Lopes psycho-oncologist and was really helpful for her anxiety, continue f/u.   # chronic headache --likely from sinusitis or using CPAP at night for sleep apnea?  --no neurological symptoms.  --recommend ENT f/u, information provided  #  abnormal thyroid function tests --TSH fluctuates, normal today.  --will monitor and repeat next time, if persists, will consider starting low dose levothyroxine given symptoms including weight gain and ongoing fatigue    #. health maintenance --no need for pap smear given h/o radical hysterectomy on 9/5/23 --last mammogram in March 2022, should proceed w/ mammogram this year if she remains cancer-free. --exercise, healthy diet recommended for unintentional weight gain.    --age appropriate vaccines per PCP  plan discussed with Dr. Piedra RTC after CT scans in mid January 2025 port flush + CBC, CMP, TSH/fT4, PT/PTT

## 2024-09-23 NOTE — HISTORY OF PRESENT ILLNESS
Speech Therapy      SUBJECTIVE  Patient agreed to participate in therapy this date.    Patient reported feeling that swallowing is improving    OBJECTIVE        Diet prior to visit: NG tube  Swallow    Numbers listed with consistency indicate IDDSI diet level.    Thin (0), cup   - Oral phase: intact   - Pharyngeal phase: impaired, multiple swallows noted and delayed cough                  ASSESSMENT  Discharge needs based on today's assessment:  - ADL / IADLs (activities / instrumental activities of daily living) requiring support at discharge: nutrition management (eating/drinking)  - Impairments that require further therapy intervention: dysphagia    Patient reports improvement in swallowing. Patient took cup sips of thin liquids. Patient's swallow was characterized by multiple swallows per presentation (4+) and delayed coughing as trials progressed.    Recommend ongoing NPO with nutrition via dobhoff and repeat video fluoroscopic swallow study to be completed 8/3. Discussed with patient and Dr. Huggins via perferctserve      PLAN (while hospitalized)  Repeat VFSS    SLP Frequency: 3-5 x per week    SLP Identified Barriers to Discharge: nutrition plan      RECOMMENDATIONS     -Diet:          *nothing by mouth    -Additional Swallow Recommendations:         *continue alternative means of nutrition, consider ice chips and free water   -Instrumental Assessments:         *Videofluoroscopic swallow study (VFSS)  -Additional recommendations:   Patient may have a few ice chips and sips of thin liquids administered under direct nursing supervision to maintain a moistened oral cavity and for comfort after oral care has been completed.     GOALS    Long Term Goals:   Patient will manage an oral diet without sequelae of aspiration in order to achieve adequate nutrition/hydration by mouth.      Documented in the chart in the following areas: Assessment.    Patient at End of Session:   Location: in chair  Handoff to:  [de-identified] : Sandra Alston is a 69 year old  referred by Dr. Moulton for evaluation for urothelial carcinoma involving bladder and urethra.   Oncologic history: 1.  urothelial carcinoma involving bladder and urethra --presented with hematuria and urinary retention --CT scan urogram 3/8/23 showed a 4.5 cm thick walled, rim enhancing mass with necrosis at the level of the urethra extending close to the L ureteral orifice.  No lymphadenopathy.  no visceral metastatic disease --cystoscopy 3/9/23 showed a flat solid appearing tumor at the L trigone and Lateral wall of the bladder extending into the urethra --TURBT 3/22/23:  biopsies from L bladder neck, L trigone, anterior bladder neck, and bladder neck all showing high grade, muscle invasive urothelial carcinoma. --chest CT 4/14/23 negative for thoracic metastatic disease --neoadjuvant chemotherapy with cisplatin and gemcitabine x 4 cycles 4/27/23 - 7/6/23 --CT c/a/p 7/2023 after completing NACT showed partial response, no interval development of metastatic disease --s/p robotic anterior exenteration, PLND with ileal conduit formation 9/5/23 (Dr Keller). Pathology: pT3a, high grade urothelial carcinoma with glandular and mucinous features, infiltrates perivesical adipose tissue and urethra.  Urethral margin positive for invasive carcinoma.  0/10 LN involved.  [id number] --initiated adjuvant immunotherapy with Nivolumab on 10/23/23 and completed 1 year of treatment on 9/20/24 --RT to surgical bed for positive urethral margin 1/30/24 - 3/29/2 --CT chest and abdomen/pelvis urogram on 7/9/24 with no local or metastatic spread of tumor.  PMH also notable for MGUS - no records - follows with Dr Brown on [place].  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   + headache daily in the morning, getting worse. takes extra strength Tylenol daily w/ some help, she used to have worsening headache when she had sinusitis in the past, using CPAP at night for sleep apnea  +chronic fatigue, manageable, exercise regularly, weight stable. she saw Dr. Lopes and the visit was really helpful for her anxiety.  otherwise she is feeling good, denies N/V/D, skin rash, cough, SOB. No any sites of pain. No blurry vision or weakness, balance is good.

## 2024-09-30 ENCOUNTER — APPOINTMENT (OUTPATIENT)
Dept: PULMONOLOGY | Facility: CLINIC | Age: 69
End: 2024-09-30
Payer: MEDICARE

## 2024-09-30 VITALS
HEART RATE: 70 BPM | HEIGHT: 63 IN | SYSTOLIC BLOOD PRESSURE: 160 MMHG | DIASTOLIC BLOOD PRESSURE: 84 MMHG | BODY MASS INDEX: 29.06 KG/M2 | WEIGHT: 164 LBS | OXYGEN SATURATION: 97 %

## 2024-09-30 DIAGNOSIS — G47.33 OBSTRUCTIVE SLEEP APNEA (ADULT) (PEDIATRIC): ICD-10-CM

## 2024-09-30 DIAGNOSIS — E66.9 OBESITY, UNSPECIFIED: ICD-10-CM

## 2024-09-30 PROCEDURE — 99213 OFFICE O/P EST LOW 20 MIN: CPT

## 2024-09-30 PROCEDURE — G2211 COMPLEX E/M VISIT ADD ON: CPT

## 2024-09-30 NOTE — HISTORY OF PRESENT ILLNESS
[TextBox_4] : - Summary : The patient, Sandra, reports using her Continuous Positive Airway Pressure (CPAP) machine every night for approximately 10 hours with no noticeable issues. She feels that she is sleeping better, and is experiencing fewer episodes of disrupted sleep. She uses an mark to follow her episodes and reported marking fewer instances of interrupted sleep. A discussion around mask options for CPAP machines occurred, with Sandra expressing an interest in possibly switching styles.  She is definitely benefiting from using the CPAP - Chief Complaint (CC) : Routine follow-up for CPAP therapy, no specific complaints. - History of Present Illness (HPI) : Patient has been diagnosed with moderate sleep apnea, requiring the use of a CPAP machine. She's been using the device every night, for around 10 hours. She has seen noticeable improvement in her sleep quality and a reduction in sleep disruptions.

## 2024-10-01 ENCOUNTER — NON-APPOINTMENT (OUTPATIENT)
Age: 69
End: 2024-10-01

## 2024-10-01 ENCOUNTER — APPOINTMENT (OUTPATIENT)
Dept: HEMATOLOGY ONCOLOGY | Facility: CLINIC | Age: 69
End: 2024-10-01
Payer: MEDICARE

## 2024-10-01 PROCEDURE — 90834 PSYTX W PT 45 MINUTES: CPT | Mod: 95

## 2024-10-08 ENCOUNTER — APPOINTMENT (OUTPATIENT)
Dept: HEMATOLOGY ONCOLOGY | Facility: CLINIC | Age: 69
End: 2024-10-08
Payer: MEDICARE

## 2024-10-08 PROCEDURE — 90834 PSYTX W PT 45 MINUTES: CPT | Mod: 95

## 2024-10-14 ENCOUNTER — APPOINTMENT (OUTPATIENT)
Dept: OTOLARYNGOLOGY | Facility: CLINIC | Age: 69
End: 2024-10-14

## 2024-10-15 ENCOUNTER — APPOINTMENT (OUTPATIENT)
Dept: HEMATOLOGY ONCOLOGY | Facility: CLINIC | Age: 69
End: 2024-10-15
Payer: MEDICARE

## 2024-10-15 PROCEDURE — 90834 PSYTX W PT 45 MINUTES: CPT | Mod: 95

## 2024-10-22 ENCOUNTER — APPOINTMENT (OUTPATIENT)
Dept: HEMATOLOGY ONCOLOGY | Facility: CLINIC | Age: 69
End: 2024-10-22
Payer: MEDICARE

## 2024-10-22 DIAGNOSIS — F41.9 ANXIETY DISORDER, UNSPECIFIED: ICD-10-CM

## 2024-10-22 PROCEDURE — 90832 PSYTX W PT 30 MINUTES: CPT | Mod: 95

## 2024-10-29 ENCOUNTER — NON-APPOINTMENT (OUTPATIENT)
Age: 69
End: 2024-10-29

## 2024-10-31 ENCOUNTER — APPOINTMENT (OUTPATIENT)
Dept: UROLOGY | Facility: CLINIC | Age: 69
End: 2024-10-31
Payer: MEDICARE

## 2024-10-31 PROCEDURE — 99214 OFFICE O/P EST MOD 30 MIN: CPT

## 2024-11-12 ENCOUNTER — APPOINTMENT (OUTPATIENT)
Dept: HEMATOLOGY ONCOLOGY | Facility: CLINIC | Age: 69
End: 2024-11-12

## 2024-11-19 ENCOUNTER — APPOINTMENT (OUTPATIENT)
Dept: HEMATOLOGY ONCOLOGY | Facility: CLINIC | Age: 69
End: 2024-11-19

## 2024-11-19 DIAGNOSIS — C67.9 MALIGNANT NEOPLASM OF BLADDER, UNSPECIFIED: ICD-10-CM

## 2024-11-19 DIAGNOSIS — R94.6 ABNORMAL RESULTS OF THYROID FUNCTION STUDIES: ICD-10-CM

## 2024-11-20 ENCOUNTER — APPOINTMENT (OUTPATIENT)
Dept: HEMATOLOGY ONCOLOGY | Facility: CLINIC | Age: 69
End: 2024-11-20

## 2024-11-20 LAB
ALBUMIN SERPL ELPH-MCNC: 3.9 G/DL
ALP BLD-CCNC: 76 U/L
ALT SERPL-CCNC: 16 U/L
ANION GAP SERPL CALC-SCNC: 5 MMOL/L
APTT BLD: 29.9 SEC
AST SERPL-CCNC: 24 U/L
BILIRUB SERPL-MCNC: 0.5 MG/DL
BUN SERPL-MCNC: 8 MG/DL
CALCIUM SERPL-MCNC: 9.7 MG/DL
CHLORIDE SERPL-SCNC: 105 MMOL/L
CO2 SERPL-SCNC: 26 MMOL/L
CREAT SERPL-MCNC: 0.7 MG/DL
EGFR: 92 ML/MIN/1.73M2
GLUCOSE SERPL-MCNC: 100 MG/DL
HCT VFR BLD CALC: 34.7 %
HGB BLD-MCNC: 11.2 G/DL
INR PPP: 1.07
LYMPHOCYTES # BLD AUTO: 1.7 K/UL
LYMPHOCYTES NFR BLD AUTO: 24.4 %
MAN DIFF?: NO
MCHC RBC-ENTMCNC: 28.3 PG
MCHC RBC-ENTMCNC: 32.3 G/DL
MCV RBC AUTO: 87.6 FL
NEUTROPHILS # BLD AUTO: 4.5 K/UL
NEUTROPHILS NFR BLD AUTO: 65.9 %
PLATELET # BLD AUTO: 265 K/UL
POTASSIUM SERPL-SCNC: 3.9 MMOL/L
PROT SERPL-MCNC: 7.1 G/DL
PT BLD: 12.5 SEC
RBC # BLD: 3.96 M/UL
RBC # FLD: 14.5 %
SODIUM SERPL-SCNC: 136 MMOL/L
WBC # FLD AUTO: 6.9 K/UL

## 2024-11-21 LAB — TSH SERPL-ACNC: 2.9 UIU/ML

## 2024-11-26 ENCOUNTER — APPOINTMENT (OUTPATIENT)
Dept: HEMATOLOGY ONCOLOGY | Facility: CLINIC | Age: 69
End: 2024-11-26

## 2024-12-30 ENCOUNTER — NON-APPOINTMENT (OUTPATIENT)
Age: 69
End: 2024-12-30

## 2025-01-07 ENCOUNTER — APPOINTMENT (OUTPATIENT)
Dept: GYNECOLOGIC ONCOLOGY | Facility: CLINIC | Age: 70
End: 2025-01-07
Payer: MEDICARE

## 2025-01-07 ENCOUNTER — NON-APPOINTMENT (OUTPATIENT)
Age: 70
End: 2025-01-07

## 2025-01-07 VITALS
HEART RATE: 74 BPM | WEIGHT: 164 LBS | BODY MASS INDEX: 29.06 KG/M2 | DIASTOLIC BLOOD PRESSURE: 92 MMHG | SYSTOLIC BLOOD PRESSURE: 154 MMHG | HEIGHT: 63 IN

## 2025-01-07 DIAGNOSIS — N76.1 SUBACUTE AND CHRONIC VAGINITIS: ICD-10-CM

## 2025-01-07 PROCEDURE — 99459 PELVIC EXAMINATION: CPT

## 2025-01-07 PROCEDURE — 99204 OFFICE O/P NEW MOD 45 MIN: CPT

## 2025-01-09 LAB — HPV HIGH+LOW RISK DNA PNL CVX: NOT DETECTED

## 2025-01-10 ENCOUNTER — APPOINTMENT (OUTPATIENT)
Dept: CT IMAGING | Facility: HOSPITAL | Age: 70
End: 2025-01-10

## 2025-01-10 ENCOUNTER — OUTPATIENT (OUTPATIENT)
Dept: OUTPATIENT SERVICES | Facility: HOSPITAL | Age: 70
LOS: 1 days | End: 2025-01-10
Payer: MEDICARE

## 2025-01-10 DIAGNOSIS — N75.0 CYST OF BARTHOLIN'S GLAND: Chronic | ICD-10-CM

## 2025-01-10 DIAGNOSIS — Z98.890 OTHER SPECIFIED POSTPROCEDURAL STATES: Chronic | ICD-10-CM

## 2025-01-10 DIAGNOSIS — Z90.79 ACQUIRED ABSENCE OF OTHER GENITAL ORGAN(S): Chronic | ICD-10-CM

## 2025-01-10 LAB — BACTERIA GENITAL AEROBE CULT: NORMAL

## 2025-01-10 PROCEDURE — 74178 CT ABD&PLV WO CNTR FLWD CNTR: CPT | Mod: 26

## 2025-01-10 PROCEDURE — 74178 CT ABD&PLV WO CNTR FLWD CNTR: CPT

## 2025-01-10 PROCEDURE — 71260 CT THORAX DX C+: CPT

## 2025-01-10 PROCEDURE — 82565 ASSAY OF CREATININE: CPT

## 2025-01-10 PROCEDURE — 71260 CT THORAX DX C+: CPT | Mod: 26

## 2025-01-13 ENCOUNTER — NON-APPOINTMENT (OUTPATIENT)
Age: 70
End: 2025-01-13

## 2025-01-13 LAB — CYTOLOGY CVX/VAG DOC THIN PREP: NORMAL

## 2025-01-14 ENCOUNTER — TRANSCRIPTION ENCOUNTER (OUTPATIENT)
Age: 70
End: 2025-01-14

## 2025-01-15 ENCOUNTER — APPOINTMENT (OUTPATIENT)
Dept: HEMATOLOGY ONCOLOGY | Facility: CLINIC | Age: 70
End: 2025-01-15
Payer: MEDICARE

## 2025-01-15 ENCOUNTER — NON-APPOINTMENT (OUTPATIENT)
Age: 70
End: 2025-01-15

## 2025-01-15 VITALS
DIASTOLIC BLOOD PRESSURE: 86 MMHG | BODY MASS INDEX: 29.06 KG/M2 | WEIGHT: 164 LBS | OXYGEN SATURATION: 97 % | HEIGHT: 63 IN | HEART RATE: 78 BPM | SYSTOLIC BLOOD PRESSURE: 153 MMHG | RESPIRATION RATE: 18 BRPM

## 2025-01-15 DIAGNOSIS — Z92.89 PERSONAL HISTORY OF OTHER MEDICAL TREATMENT: ICD-10-CM

## 2025-01-15 DIAGNOSIS — T45.1X5A OTHER FATIGUE: ICD-10-CM

## 2025-01-15 DIAGNOSIS — C67.9 MALIGNANT NEOPLASM OF BLADDER, UNSPECIFIED: ICD-10-CM

## 2025-01-15 DIAGNOSIS — R10.2 PELVIC AND PERINEAL PAIN: ICD-10-CM

## 2025-01-15 DIAGNOSIS — R53.83 OTHER FATIGUE: ICD-10-CM

## 2025-01-15 DIAGNOSIS — Z09 ENCOUNTER FOR FOLLOW-UP EXAMINATION AFTER COMPLETED TREATMENT FOR CONDITIONS OTHER THAN MALIGNANT NEOPLASM: ICD-10-CM

## 2025-01-15 LAB
ALBUMIN SERPL ELPH-MCNC: 3.9 G/DL
ALP BLD-CCNC: 84 U/L
ALT SERPL-CCNC: 11 U/L
ANION GAP SERPL CALC-SCNC: 0 MMOL/L
AST SERPL-CCNC: 23 U/L
BILIRUB SERPL-MCNC: 0.5 MG/DL
BUN SERPL-MCNC: 7 MG/DL
CALCIUM SERPL-MCNC: 9.2 MG/DL
CHLORIDE SERPL-SCNC: 111 MMOL/L
CO2 SERPL-SCNC: 27 MMOL/L
CREAT SERPL-MCNC: 0.6 MG/DL
EGFR: 96 ML/MIN/1.73M2
GLUCOSE SERPL-MCNC: 118 MG/DL
HCT VFR BLD CALC: 34.1 %
HGB BLD-MCNC: 11 G/DL
LYMPHOCYTES # BLD AUTO: 1.7 K/UL
LYMPHOCYTES NFR BLD AUTO: 22.6 %
MAN DIFF?: NO
MCHC RBC-ENTMCNC: 27 PG
MCHC RBC-ENTMCNC: 32.3 G/DL
MCV RBC AUTO: 83.6 FL
NEUTROPHILS # BLD AUTO: 5.4 K/UL
NEUTROPHILS NFR BLD AUTO: 69 %
PLATELET # BLD AUTO: 316 K/UL
POTASSIUM SERPL-SCNC: 4.5 MMOL/L
PROT SERPL-MCNC: 7.6 G/DL
RBC # BLD: 4.08 M/UL
RBC # FLD: 15.1 %
SODIUM SERPL-SCNC: 138 MMOL/L
WBC # FLD AUTO: 7.7 K/UL

## 2025-01-15 PROCEDURE — 99214 OFFICE O/P EST MOD 30 MIN: CPT | Mod: 25

## 2025-01-15 PROCEDURE — 36415 COLL VENOUS BLD VENIPUNCTURE: CPT

## 2025-01-15 RX ORDER — GABAPENTIN 100 MG/1
100 CAPSULE ORAL 3 TIMES DAILY
Qty: 1 | Refills: 2 | Status: ACTIVE | COMMUNITY
Start: 2025-01-15 | End: 1900-01-01

## 2025-01-16 PROBLEM — Z09 CHEMOTHERAPY FOLLOW-UP EXAMINATION: Status: RESOLVED | Noted: 2023-05-25 | Resolved: 2025-01-16

## 2025-01-16 PROBLEM — Z92.89 HISTORY OF IMMUNOTHERAPY: Status: RESOLVED | Noted: 2023-11-13 | Resolved: 2025-01-16

## 2025-01-16 PROBLEM — R53.83 CHEMOTHERAPY-INDUCED FATIGUE: Status: RESOLVED | Noted: 2023-05-25 | Resolved: 2025-01-16

## 2025-01-21 ENCOUNTER — APPOINTMENT (OUTPATIENT)
Dept: UROLOGY | Facility: CLINIC | Age: 70
End: 2025-01-21
Payer: MEDICARE

## 2025-01-21 VITALS
HEART RATE: 79 BPM | DIASTOLIC BLOOD PRESSURE: 85 MMHG | SYSTOLIC BLOOD PRESSURE: 137 MMHG | TEMPERATURE: 97 F | OXYGEN SATURATION: 99 %

## 2025-01-21 PROCEDURE — 99214 OFFICE O/P EST MOD 30 MIN: CPT

## 2025-01-27 LAB — URINE CYTOLOGY: NORMAL

## 2025-02-03 ENCOUNTER — RESULT REVIEW (OUTPATIENT)
Age: 70
End: 2025-02-03

## 2025-02-03 ENCOUNTER — OUTPATIENT (OUTPATIENT)
Dept: OUTPATIENT SERVICES | Facility: HOSPITAL | Age: 70
LOS: 1 days | End: 2025-02-03
Payer: MEDICARE

## 2025-02-03 DIAGNOSIS — N75.0 CYST OF BARTHOLIN'S GLAND: Chronic | ICD-10-CM

## 2025-02-03 DIAGNOSIS — C67.9 MALIGNANT NEOPLASM OF BLADDER, UNSPECIFIED: ICD-10-CM

## 2025-02-03 DIAGNOSIS — Z98.890 OTHER SPECIFIED POSTPROCEDURAL STATES: Chronic | ICD-10-CM

## 2025-02-03 DIAGNOSIS — Z90.79 ACQUIRED ABSENCE OF OTHER GENITAL ORGAN(S): Chronic | ICD-10-CM

## 2025-02-03 LAB — GLUCOSE BLDC GLUCOMTR-MCNC: 106 MG/DL — HIGH (ref 70–99)

## 2025-02-03 PROCEDURE — 78815 PET IMAGE W/CT SKULL-THIGH: CPT | Mod: 26,PI

## 2025-02-03 PROCEDURE — 82962 GLUCOSE BLOOD TEST: CPT

## 2025-02-03 PROCEDURE — A9552: CPT

## 2025-02-03 PROCEDURE — 78815 PET IMAGE W/CT SKULL-THIGH: CPT | Mod: PI

## 2025-02-04 ENCOUNTER — APPOINTMENT (OUTPATIENT)
Dept: GYNECOLOGIC ONCOLOGY | Facility: CLINIC | Age: 70
End: 2025-02-04

## 2025-02-04 ENCOUNTER — NON-APPOINTMENT (OUTPATIENT)
Age: 70
End: 2025-02-04

## 2025-02-04 DIAGNOSIS — C67.9 MALIGNANT NEOPLASM OF BLADDER, UNSPECIFIED: ICD-10-CM

## 2025-02-12 ENCOUNTER — APPOINTMENT (OUTPATIENT)
Dept: HEMATOLOGY ONCOLOGY | Facility: CLINIC | Age: 70
End: 2025-02-12
Payer: MEDICARE

## 2025-02-12 VITALS
HEART RATE: 82 BPM | WEIGHT: 163.38 LBS | BODY MASS INDEX: 28.95 KG/M2 | SYSTOLIC BLOOD PRESSURE: 153 MMHG | HEIGHT: 63 IN | DIASTOLIC BLOOD PRESSURE: 102 MMHG | OXYGEN SATURATION: 96 % | RESPIRATION RATE: 18 BRPM | TEMPERATURE: 97.5 F

## 2025-02-12 DIAGNOSIS — C67.9 MALIGNANT NEOPLASM OF BLADDER, UNSPECIFIED: ICD-10-CM

## 2025-02-12 PROCEDURE — 36415 COLL VENOUS BLD VENIPUNCTURE: CPT

## 2025-02-12 PROCEDURE — 99215 OFFICE O/P EST HI 40 MIN: CPT | Mod: 25

## 2025-02-12 RX ORDER — OXYCODONE 5 MG/1
5 TABLET ORAL
Qty: 40 | Refills: 0 | Status: ACTIVE | COMMUNITY
Start: 2025-02-12 | End: 1900-01-01

## 2025-02-13 ENCOUNTER — NON-APPOINTMENT (OUTPATIENT)
Age: 70
End: 2025-02-13

## 2025-02-14 LAB
ALBUMIN SERPL ELPH-MCNC: 3.5 G/DL
ALP BLD-CCNC: 82 U/L
ALT SERPL-CCNC: 14 U/L
ANION GAP SERPL CALC-SCNC: 1 MMOL/L
AST SERPL-CCNC: 21 U/L
BILIRUB SERPL-MCNC: 0.5 MG/DL
BUN SERPL-MCNC: 10 MG/DL
CALCIUM SERPL-MCNC: 9 MG/DL
CHLORIDE SERPL-SCNC: 113 MMOL/L
CO2 SERPL-SCNC: 29 MMOL/L
CREAT SERPL-MCNC: 0.6 MG/DL
EGFR: 95 ML/MIN/1.73M2
GLUCOSE SERPL-MCNC: 97 MG/DL
HCT VFR BLD CALC: 33.3 %
HGB BLD-MCNC: 10.7 G/DL
LYMPHOCYTES # BLD AUTO: 1.4 K/UL
LYMPHOCYTES NFR BLD AUTO: 17 %
MAN DIFF?: NO
MCHC RBC-ENTMCNC: 26.6 PG
MCHC RBC-ENTMCNC: 32.1 G/DL
MCV RBC AUTO: 82.6 FL
NEUTROPHILS # BLD AUTO: 5.8 K/UL
NEUTROPHILS NFR BLD AUTO: 70.3 %
PLATELET # BLD AUTO: 305 K/UL
POTASSIUM SERPL-SCNC: 4.5 MMOL/L
PROT SERPL-MCNC: 7.3 G/DL
RBC # BLD: 4.03 M/UL
RBC # FLD: 15.1 %
SODIUM SERPL-SCNC: 143 MMOL/L
WBC # FLD AUTO: 8.2 K/UL

## 2025-02-18 RX ORDER — LIDOCAINE HYDROCHLORIDE 20 MG/ML
1 JELLY TOPICAL ONCE
Refills: 0 | Status: COMPLETED | OUTPATIENT
Start: 2025-02-20 | End: 2025-02-20

## 2025-02-20 ENCOUNTER — APPOINTMENT (OUTPATIENT)
Dept: INFUSION THERAPY | Facility: CLINIC | Age: 70
End: 2025-02-20

## 2025-02-20 ENCOUNTER — OUTPATIENT (OUTPATIENT)
Dept: OUTPATIENT SERVICES | Facility: HOSPITAL | Age: 70
LOS: 1 days | End: 2025-02-20
Payer: MEDICARE

## 2025-02-20 VITALS
DIASTOLIC BLOOD PRESSURE: 70 MMHG | SYSTOLIC BLOOD PRESSURE: 125 MMHG | HEIGHT: 63 IN | TEMPERATURE: 98 F | RESPIRATION RATE: 16 BRPM | WEIGHT: 162.92 LBS | HEART RATE: 69 BPM | OXYGEN SATURATION: 98 %

## 2025-02-20 VITALS
RESPIRATION RATE: 16 BRPM | TEMPERATURE: 98 F | DIASTOLIC BLOOD PRESSURE: 62 MMHG | OXYGEN SATURATION: 97 % | HEART RATE: 71 BPM | SYSTOLIC BLOOD PRESSURE: 124 MMHG

## 2025-02-20 DIAGNOSIS — C67.9 MALIGNANT NEOPLASM OF BLADDER, UNSPECIFIED: ICD-10-CM

## 2025-02-20 DIAGNOSIS — Z98.890 OTHER SPECIFIED POSTPROCEDURAL STATES: Chronic | ICD-10-CM

## 2025-02-20 DIAGNOSIS — Z90.79 ACQUIRED ABSENCE OF OTHER GENITAL ORGAN(S): Chronic | ICD-10-CM

## 2025-02-20 DIAGNOSIS — N75.0 CYST OF BARTHOLIN'S GLAND: Chronic | ICD-10-CM

## 2025-02-20 LAB
HBV CORE AB SER-ACNC: SIGNIFICANT CHANGE UP
HBV SURFACE AB SER-ACNC: ABNORMAL
HBV SURFACE AG SER-ACNC: SIGNIFICANT CHANGE UP

## 2025-02-20 PROCEDURE — 96375 TX/PRO/DX INJ NEW DRUG ADDON: CPT

## 2025-02-20 PROCEDURE — 86704 HEP B CORE ANTIBODY TOTAL: CPT

## 2025-02-20 PROCEDURE — 36415 COLL VENOUS BLD VENIPUNCTURE: CPT

## 2025-02-20 PROCEDURE — 82962 GLUCOSE BLOOD TEST: CPT

## 2025-02-20 PROCEDURE — 86706 HEP B SURFACE ANTIBODY: CPT

## 2025-02-20 PROCEDURE — 96413 CHEMO IV INFUSION 1 HR: CPT

## 2025-02-20 PROCEDURE — 87340 HEPATITIS B SURFACE AG IA: CPT

## 2025-02-20 PROCEDURE — 96417 CHEMO IV INFUS EACH ADDL SEQ: CPT

## 2025-02-20 RX ORDER — DEXAMETHASONE 0.5 MG/1
12 TABLET ORAL ONCE
Refills: 0 | Status: COMPLETED | OUTPATIENT
Start: 2025-02-20 | End: 2025-02-20

## 2025-02-20 RX ORDER — PALONOSETRON HYDROCHLORIDE 0.05 MG/ML
0.25 INJECTION, SOLUTION INTRAVENOUS ONCE
Refills: 0 | Status: COMPLETED | OUTPATIENT
Start: 2025-02-20 | End: 2025-02-20

## 2025-02-20 RX ORDER — PEMBROLIZUMAB 50 MG/2ML
200 INJECTION, POWDER, LYOPHILIZED, FOR SOLUTION INTRAVENOUS ONCE
Refills: 0 | Status: COMPLETED | OUTPATIENT
Start: 2025-02-20 | End: 2025-02-20

## 2025-02-20 RX ORDER — ENFORTUMAB VEDOTIN 30 MG/3.3ML
90 INJECTION, POWDER, LYOPHILIZED, FOR SOLUTION INTRAVENOUS ONCE
Refills: 0 | Status: COMPLETED | OUTPATIENT
Start: 2025-02-20 | End: 2025-02-20

## 2025-02-20 RX ADMIN — DEXAMETHASONE 212 MILLIGRAM(S): 0.5 TABLET ORAL at 09:40

## 2025-02-20 RX ADMIN — ENFORTUMAB VEDOTIN 90 MILLIGRAM(S): 30 INJECTION, POWDER, LYOPHILIZED, FOR SOLUTION INTRAVENOUS at 10:02

## 2025-02-20 RX ADMIN — PALONOSETRON HYDROCHLORIDE 0.25 MILLIGRAM(S): 0.05 INJECTION, SOLUTION INTRAVENOUS at 09:38

## 2025-02-20 RX ADMIN — PEMBROLIZUMAB 200 MILLIGRAM(S): 50 INJECTION, POWDER, LYOPHILIZED, FOR SOLUTION INTRAVENOUS at 08:59

## 2025-02-20 RX ADMIN — DEXAMETHASONE 12 MILLIGRAM(S): 0.5 TABLET ORAL at 09:55

## 2025-02-20 RX ADMIN — ENFORTUMAB VEDOTIN 90 MILLIGRAM(S): 30 INJECTION, POWDER, LYOPHILIZED, FOR SOLUTION INTRAVENOUS at 10:35

## 2025-02-20 RX ADMIN — PEMBROLIZUMAB 200 MILLIGRAM(S): 50 INJECTION, POWDER, LYOPHILIZED, FOR SOLUTION INTRAVENOUS at 09:30

## 2025-02-27 ENCOUNTER — OUTPATIENT (OUTPATIENT)
Dept: OUTPATIENT SERVICES | Facility: HOSPITAL | Age: 70
LOS: 1 days | End: 2025-02-27
Payer: MEDICARE

## 2025-02-27 ENCOUNTER — APPOINTMENT (OUTPATIENT)
Dept: INFUSION THERAPY | Facility: CLINIC | Age: 70
End: 2025-02-27

## 2025-02-27 ENCOUNTER — APPOINTMENT (OUTPATIENT)
Dept: HEMATOLOGY ONCOLOGY | Facility: CLINIC | Age: 70
End: 2025-02-27
Payer: MEDICARE

## 2025-02-27 VITALS
SYSTOLIC BLOOD PRESSURE: 124 MMHG | DIASTOLIC BLOOD PRESSURE: 73 MMHG | RESPIRATION RATE: 16 BRPM | TEMPERATURE: 98 F | OXYGEN SATURATION: 97 % | HEART RATE: 69 BPM

## 2025-02-27 VITALS
HEART RATE: 76 BPM | RESPIRATION RATE: 18 BRPM | OXYGEN SATURATION: 98 % | TEMPERATURE: 97.6 F | SYSTOLIC BLOOD PRESSURE: 141 MMHG | WEIGHT: 162.25 LBS | HEIGHT: 62.99 IN | BODY MASS INDEX: 28.75 KG/M2 | DIASTOLIC BLOOD PRESSURE: 91 MMHG

## 2025-02-27 VITALS
TEMPERATURE: 99 F | RESPIRATION RATE: 18 BRPM | HEIGHT: 63 IN | WEIGHT: 160.06 LBS | HEART RATE: 67 BPM | SYSTOLIC BLOOD PRESSURE: 123 MMHG | DIASTOLIC BLOOD PRESSURE: 79 MMHG | OXYGEN SATURATION: 98 %

## 2025-02-27 DIAGNOSIS — M79.10 MYALGIA, UNSPECIFIED SITE: ICD-10-CM

## 2025-02-27 DIAGNOSIS — R10.2 PELVIC AND PERINEAL PAIN: ICD-10-CM

## 2025-02-27 DIAGNOSIS — C67.9 MALIGNANT NEOPLASM OF BLADDER, UNSPECIFIED: ICD-10-CM

## 2025-02-27 DIAGNOSIS — R23.8 OTHER SKIN CHANGES: ICD-10-CM

## 2025-02-27 DIAGNOSIS — Z98.890 OTHER SPECIFIED POSTPROCEDURAL STATES: Chronic | ICD-10-CM

## 2025-02-27 DIAGNOSIS — N75.0 CYST OF BARTHOLIN'S GLAND: Chronic | ICD-10-CM

## 2025-02-27 DIAGNOSIS — K59.00 CONSTIPATION, UNSPECIFIED: ICD-10-CM

## 2025-02-27 DIAGNOSIS — Z90.79 ACQUIRED ABSENCE OF OTHER GENITAL ORGAN(S): Chronic | ICD-10-CM

## 2025-02-27 DIAGNOSIS — D64.9 ANEMIA, UNSPECIFIED: ICD-10-CM

## 2025-02-27 LAB
ALBUMIN SERPL ELPH-MCNC: 3.4 G/DL
ALP BLD-CCNC: 78 U/L
ALT SERPL-CCNC: 13 U/L
ANION GAP SERPL CALC-SCNC: 4 MMOL/L
AST SERPL-CCNC: 22 U/L
BILIRUB SERPL-MCNC: 0.5 MG/DL
BUN SERPL-MCNC: 9 MG/DL
CALCIUM SERPL-MCNC: 9.4 MG/DL
CHLORIDE SERPL-SCNC: 107 MMOL/L
CO2 SERPL-SCNC: 25 MMOL/L
CREAT SERPL-MCNC: 0.7 MG/DL
EGFR: 92 ML/MIN/1.73M2
GLUCOSE SERPL-MCNC: 103 MG/DL
HCT VFR BLD CALC: 30.8 %
HGB BLD-MCNC: 9.9 G/DL
LYMPHOCYTES # BLD AUTO: 1.3 K/UL
LYMPHOCYTES NFR BLD AUTO: 15.9 %
MAN DIFF?: NO
MCHC RBC-ENTMCNC: 26.7 PG
MCHC RBC-ENTMCNC: 32.1 G/DL
MCV RBC AUTO: 83 FL
NEUTROPHILS # BLD AUTO: 5.9 K/UL
NEUTROPHILS NFR BLD AUTO: 70.3 %
PLATELET # BLD AUTO: 271 K/UL
POTASSIUM SERPL-SCNC: 4.2 MMOL/L
PROT SERPL-MCNC: 7.1 G/DL
RBC # BLD: 3.71 M/UL
RBC # FLD: 15.4 %
SODIUM SERPL-SCNC: 136 MMOL/L
WBC # FLD AUTO: 8.4 K/UL

## 2025-02-27 PROCEDURE — 36415 COLL VENOUS BLD VENIPUNCTURE: CPT

## 2025-02-27 PROCEDURE — 96375 TX/PRO/DX INJ NEW DRUG ADDON: CPT

## 2025-02-27 PROCEDURE — 99214 OFFICE O/P EST MOD 30 MIN: CPT | Mod: 25

## 2025-02-27 PROCEDURE — 96413 CHEMO IV INFUSION 1 HR: CPT

## 2025-02-27 RX ORDER — LIDOCAINE HYDROCHLORIDE 20 MG/ML
1 JELLY TOPICAL ONCE
Refills: 0 | Status: COMPLETED | OUTPATIENT
Start: 2025-02-27 | End: 2025-02-27

## 2025-02-27 RX ORDER — PALONOSETRON HYDROCHLORIDE 0.05 MG/ML
0.25 INJECTION, SOLUTION INTRAVENOUS ONCE
Refills: 0 | Status: COMPLETED | OUTPATIENT
Start: 2025-02-27 | End: 2025-02-27

## 2025-02-27 RX ORDER — DEXAMETHASONE 0.5 MG/1
12 TABLET ORAL ONCE
Refills: 0 | Status: COMPLETED | OUTPATIENT
Start: 2025-02-27 | End: 2025-02-27

## 2025-02-27 RX ORDER — ENFORTUMAB VEDOTIN 30 MG/3.3ML
90 INJECTION, POWDER, LYOPHILIZED, FOR SOLUTION INTRAVENOUS ONCE
Refills: 0 | Status: COMPLETED | OUTPATIENT
Start: 2025-02-27 | End: 2025-02-27

## 2025-02-27 RX ADMIN — ENFORTUMAB VEDOTIN 90 MILLIGRAM(S): 30 INJECTION, POWDER, LYOPHILIZED, FOR SOLUTION INTRAVENOUS at 17:15

## 2025-02-27 RX ADMIN — DEXAMETHASONE 212 MILLIGRAM(S): 0.5 TABLET ORAL at 15:59

## 2025-02-27 RX ADMIN — ENFORTUMAB VEDOTIN 90 MILLIGRAM(S): 30 INJECTION, POWDER, LYOPHILIZED, FOR SOLUTION INTRAVENOUS at 16:39

## 2025-02-27 RX ADMIN — PALONOSETRON HYDROCHLORIDE 0.25 MILLIGRAM(S): 0.05 INJECTION, SOLUTION INTRAVENOUS at 15:59

## 2025-02-27 RX ADMIN — DEXAMETHASONE 12 MILLIGRAM(S): 0.5 TABLET ORAL at 16:14

## 2025-02-27 RX ADMIN — Medication 10 MILLILITER(S): at 15:40

## 2025-02-28 LAB
ESTIMATED AVERAGE GLUCOSE: 120 MG/DL
HBA1C MFR BLD HPLC: 5.8 %

## 2025-02-28 RX ORDER — CYCLOBENZAPRINE HYDROCHLORIDE 5 MG/1
5 TABLET, FILM COATED ORAL
Qty: 21 | Refills: 1 | Status: ACTIVE | COMMUNITY
Start: 2025-02-28 | End: 1900-01-01

## 2025-02-28 RX ORDER — GABAPENTIN 300 MG/1
300 CAPSULE ORAL
Qty: 60 | Refills: 4 | Status: ACTIVE | COMMUNITY
Start: 2025-02-28 | End: 1900-01-01

## 2025-03-03 ENCOUNTER — NON-APPOINTMENT (OUTPATIENT)
Age: 70
End: 2025-03-03

## 2025-03-07 NOTE — ED PROVIDER NOTE - TEMPLATE, MLM
[FreeTextEntry1] : Left breast mass present for 2 weeks  The mass appears to be a simple cyst  Patient advised follow-up in 2 weeks  Aspiration will be performed at that time if the mass persists  A total of 30 minutes was spent in consultation evaluation review VIEW ALL

## 2025-03-13 ENCOUNTER — APPOINTMENT (OUTPATIENT)
Dept: INFUSION THERAPY | Facility: CLINIC | Age: 70
End: 2025-03-13

## 2025-03-13 ENCOUNTER — APPOINTMENT (OUTPATIENT)
Dept: HEMATOLOGY ONCOLOGY | Facility: CLINIC | Age: 70
End: 2025-03-13
Payer: MEDICARE

## 2025-03-13 ENCOUNTER — OUTPATIENT (OUTPATIENT)
Dept: OUTPATIENT SERVICES | Facility: HOSPITAL | Age: 70
LOS: 1 days | End: 2025-03-13
Payer: MEDICARE

## 2025-03-13 VITALS
OXYGEN SATURATION: 95 % | BODY MASS INDEX: 28.35 KG/M2 | RESPIRATION RATE: 18 BRPM | HEIGHT: 63 IN | HEART RATE: 92 BPM | WEIGHT: 160 LBS | DIASTOLIC BLOOD PRESSURE: 81 MMHG | TEMPERATURE: 97.8 F | SYSTOLIC BLOOD PRESSURE: 126 MMHG

## 2025-03-13 VITALS
TEMPERATURE: 98 F | HEART RATE: 87 BPM | SYSTOLIC BLOOD PRESSURE: 117 MMHG | RESPIRATION RATE: 18 BRPM | DIASTOLIC BLOOD PRESSURE: 78 MMHG | WEIGHT: 162.04 LBS | OXYGEN SATURATION: 98 % | HEIGHT: 63 IN

## 2025-03-13 VITALS
DIASTOLIC BLOOD PRESSURE: 74 MMHG | RESPIRATION RATE: 17 BRPM | TEMPERATURE: 98 F | SYSTOLIC BLOOD PRESSURE: 119 MMHG | OXYGEN SATURATION: 95 % | HEART RATE: 69 BPM

## 2025-03-13 DIAGNOSIS — E61.1 IRON DEFICIENCY: ICD-10-CM

## 2025-03-13 DIAGNOSIS — C67.9 MALIGNANT NEOPLASM OF BLADDER, UNSPECIFIED: ICD-10-CM

## 2025-03-13 DIAGNOSIS — Z98.890 OTHER SPECIFIED POSTPROCEDURAL STATES: Chronic | ICD-10-CM

## 2025-03-13 DIAGNOSIS — N75.0 CYST OF BARTHOLIN'S GLAND: Chronic | ICD-10-CM

## 2025-03-13 DIAGNOSIS — Z90.79 ACQUIRED ABSENCE OF OTHER GENITAL ORGAN(S): Chronic | ICD-10-CM

## 2025-03-13 PROCEDURE — 96413 CHEMO IV INFUSION 1 HR: CPT

## 2025-03-13 PROCEDURE — 96417 CHEMO IV INFUS EACH ADDL SEQ: CPT

## 2025-03-13 PROCEDURE — 36415 COLL VENOUS BLD VENIPUNCTURE: CPT

## 2025-03-13 PROCEDURE — 99214 OFFICE O/P EST MOD 30 MIN: CPT | Mod: 25

## 2025-03-13 PROCEDURE — 96375 TX/PRO/DX INJ NEW DRUG ADDON: CPT

## 2025-03-13 RX ORDER — PEMBROLIZUMAB 50 MG/2ML
200 INJECTION, POWDER, LYOPHILIZED, FOR SOLUTION INTRAVENOUS ONCE
Refills: 0 | Status: COMPLETED | OUTPATIENT
Start: 2025-03-13 | End: 2025-03-13

## 2025-03-13 RX ORDER — DEXAMETHASONE 0.5 MG/1
12 TABLET ORAL ONCE
Refills: 0 | Status: COMPLETED | OUTPATIENT
Start: 2025-03-13 | End: 2025-03-13

## 2025-03-13 RX ORDER — PALONOSETRON HYDROCHLORIDE 0.05 MG/ML
0.25 INJECTION, SOLUTION INTRAVENOUS ONCE
Refills: 0 | Status: COMPLETED | OUTPATIENT
Start: 2025-03-13 | End: 2025-03-13

## 2025-03-13 RX ORDER — ENFORTUMAB VEDOTIN 30 MG/3.3ML
90 INJECTION, POWDER, LYOPHILIZED, FOR SOLUTION INTRAVENOUS ONCE
Refills: 0 | Status: COMPLETED | OUTPATIENT
Start: 2025-03-13 | End: 2025-03-13

## 2025-03-13 RX ORDER — LIDOCAINE HYDROCHLORIDE 20 MG/ML
1 JELLY TOPICAL ONCE
Refills: 0 | Status: COMPLETED | OUTPATIENT
Start: 2025-03-13 | End: 2025-03-13

## 2025-03-13 RX ADMIN — PALONOSETRON HYDROCHLORIDE 0.25 MILLIGRAM(S): 0.05 INJECTION, SOLUTION INTRAVENOUS at 14:29

## 2025-03-13 RX ADMIN — ENFORTUMAB VEDOTIN 90 MILLIGRAM(S): 30 INJECTION, POWDER, LYOPHILIZED, FOR SOLUTION INTRAVENOUS at 15:32

## 2025-03-13 RX ADMIN — Medication 10 MILLILITER(S): at 16:48

## 2025-03-13 RX ADMIN — DEXAMETHASONE 212 MILLIGRAM(S): 0.5 TABLET ORAL at 14:29

## 2025-03-13 RX ADMIN — DEXAMETHASONE 12 MILLIGRAM(S): 0.5 TABLET ORAL at 14:45

## 2025-03-13 RX ADMIN — PEMBROLIZUMAB 200 MILLIGRAM(S): 50 INJECTION, POWDER, LYOPHILIZED, FOR SOLUTION INTRAVENOUS at 15:52

## 2025-03-13 RX ADMIN — PEMBROLIZUMAB 200 MILLIGRAM(S): 50 INJECTION, POWDER, LYOPHILIZED, FOR SOLUTION INTRAVENOUS at 16:22

## 2025-03-13 RX ADMIN — ENFORTUMAB VEDOTIN 90 MILLIGRAM(S): 30 INJECTION, POWDER, LYOPHILIZED, FOR SOLUTION INTRAVENOUS at 15:02

## 2025-03-14 LAB
ALBUMIN SERPL ELPH-MCNC: 3.1 G/DL
ALP BLD-CCNC: 83 U/L
ALT SERPL-CCNC: 11 U/L
ANION GAP SERPL CALC-SCNC: 2 MMOL/L
AST SERPL-CCNC: 20 U/L
BILIRUB SERPL-MCNC: 0.5 MG/DL
BUN SERPL-MCNC: 10 MG/DL
CALCIUM SERPL-MCNC: 9.4 MG/DL
CHLORIDE SERPL-SCNC: 113 MMOL/L
CO2 SERPL-SCNC: 23 MMOL/L
CREAT SERPL-MCNC: 0.5 MG/DL
EGFRCR SERPLBLD CKD-EPI 2021: 100 ML/MIN/1.73M2
FERRITIN SERPL-MCNC: 84 NG/ML
FOLATE SERPL-MCNC: >20 NG/ML
GLUCOSE SERPL-MCNC: 143 MG/DL
HCT VFR BLD CALC: 31.5 %
HGB BLD-MCNC: 10.2 G/DL
IRON SATN MFR SERPL: 10 %
IRON SERPL-MCNC: 24 UG/DL
LYMPHOCYTES # BLD AUTO: 1.9 K/UL
LYMPHOCYTES NFR BLD AUTO: 20.2 %
MAN DIFF?: NO
MCHC RBC-ENTMCNC: 26.6 PG
MCHC RBC-ENTMCNC: 32.4 G/DL
MCV RBC AUTO: 82 FL
NEUTROPHILS # BLD AUTO: 6.5 K/UL
NEUTROPHILS NFR BLD AUTO: 70.9 %
PLATELET # BLD AUTO: 366 K/UL
POTASSIUM SERPL-SCNC: 4.1 MMOL/L
PROT SERPL-MCNC: 6.7 G/DL
RBC # BLD: 3.84 M/UL
RBC # FLD: 16.4 %
SODIUM SERPL-SCNC: 138 MMOL/L
TIBC SERPL-MCNC: 242 UG/DL
TSH SERPL-ACNC: 2.55 UIU/ML
UIBC SERPL-MCNC: 218 UG/DL
VIT B12 SERPL-MCNC: 520 PG/ML
WBC # FLD AUTO: 9.2 K/UL

## 2025-03-20 ENCOUNTER — APPOINTMENT (OUTPATIENT)
Dept: INFUSION THERAPY | Facility: CLINIC | Age: 70
End: 2025-03-20

## 2025-03-20 ENCOUNTER — OUTPATIENT (OUTPATIENT)
Dept: OUTPATIENT SERVICES | Facility: HOSPITAL | Age: 70
LOS: 1 days | End: 2025-03-20
Payer: MEDICARE

## 2025-03-20 VITALS
WEIGHT: 162.04 LBS | SYSTOLIC BLOOD PRESSURE: 139 MMHG | OXYGEN SATURATION: 97 % | HEIGHT: 63 IN | TEMPERATURE: 98 F | DIASTOLIC BLOOD PRESSURE: 79 MMHG | RESPIRATION RATE: 18 BRPM | HEART RATE: 92 BPM

## 2025-03-20 DIAGNOSIS — Z98.890 OTHER SPECIFIED POSTPROCEDURAL STATES: Chronic | ICD-10-CM

## 2025-03-20 DIAGNOSIS — C67.9 MALIGNANT NEOPLASM OF BLADDER, UNSPECIFIED: ICD-10-CM

## 2025-03-20 DIAGNOSIS — N75.0 CYST OF BARTHOLIN'S GLAND: Chronic | ICD-10-CM

## 2025-03-20 DIAGNOSIS — Z90.79 ACQUIRED ABSENCE OF OTHER GENITAL ORGAN(S): Chronic | ICD-10-CM

## 2025-03-20 LAB
ALBUMIN SERPL ELPH-MCNC: 3.2 G/DL — LOW (ref 3.3–5)
ALP SERPL-CCNC: 84 U/L — SIGNIFICANT CHANGE UP (ref 40–120)
ALT FLD-CCNC: 15 U/L — SIGNIFICANT CHANGE UP (ref 10–45)
ANION GAP SERPL CALC-SCNC: 7 MMOL/L — SIGNIFICANT CHANGE UP (ref 5–17)
BILIRUB SERPL-MCNC: 0.5 MG/DL — SIGNIFICANT CHANGE UP (ref 0.2–1.2)
BUN SERPL-MCNC: 10 MG/DL — SIGNIFICANT CHANGE UP (ref 7–23)
CALCIUM SERPL-MCNC: 9.5 MG/DL — SIGNIFICANT CHANGE UP (ref 8.4–10.5)
CHLORIDE SERPL-SCNC: 106 MMOL/L — SIGNIFICANT CHANGE UP (ref 96–108)
CO2 SERPL-SCNC: 26 MMOL/L — SIGNIFICANT CHANGE UP (ref 22–31)
CREAT SERPL-MCNC: 0.6 MG/DL — SIGNIFICANT CHANGE UP (ref 0.5–1.3)
EGFR: 95 ML/MIN/1.73M2 — SIGNIFICANT CHANGE UP
EGFR: 95 ML/MIN/1.73M2 — SIGNIFICANT CHANGE UP
GLUCOSE SERPL-MCNC: 109 MG/DL — HIGH (ref 70–99)
HCT VFR BLD CALC: 32.2 % — LOW (ref 34.5–45)
LYMPHOCYTES # BLD AUTO: 1.5 K/UL — SIGNIFICANT CHANGE UP (ref 1–3.3)
LYMPHOCYTES # BLD AUTO: 16.9 % — SIGNIFICANT CHANGE UP (ref 13–44)
MAGNESIUM SERPL-MCNC: 2.2 MG/DL — SIGNIFICANT CHANGE UP (ref 1.6–2.6)
MCHC RBC-ENTMCNC: 26.3 PG — LOW (ref 27–34)
MCHC RBC-ENTMCNC: 32 G/DL — SIGNIFICANT CHANGE UP (ref 32–36)
MCV RBC AUTO: 82.4 FL — SIGNIFICANT CHANGE UP (ref 80–100)
NEUTROPHILS # BLD AUTO: 6 K/UL — SIGNIFICANT CHANGE UP (ref 1.8–7.4)
NEUTROPHILS NFR BLD AUTO: 66 % — SIGNIFICANT CHANGE UP (ref 43–77)
PLATELET # BLD AUTO: 342 K/UL — SIGNIFICANT CHANGE UP (ref 150–400)
POTASSIUM SERPL-MCNC: 4.1 MMOL/L — SIGNIFICANT CHANGE UP (ref 3.5–5.3)
POTASSIUM SERPL-SCNC: 4.1 MMOL/L — SIGNIFICANT CHANGE UP (ref 3.5–5.3)
RBC # BLD: 3.91 M/UL — SIGNIFICANT CHANGE UP (ref 3.8–5.2)
RBC # FLD: 16.5 % — HIGH (ref 10.3–14.5)
SODIUM SERPL-SCNC: 139 MMOL/L — SIGNIFICANT CHANGE UP (ref 135–145)
WBC # BLD: 9.1 K/UL — SIGNIFICANT CHANGE UP (ref 3.8–10.5)
WBC # FLD AUTO: 9.1 K/UL — SIGNIFICANT CHANGE UP (ref 3.8–10.5)

## 2025-03-20 PROCEDURE — 83735 ASSAY OF MAGNESIUM: CPT

## 2025-03-20 PROCEDURE — 36415 COLL VENOUS BLD VENIPUNCTURE: CPT

## 2025-03-20 PROCEDURE — 85025 COMPLETE CBC W/AUTO DIFF WBC: CPT

## 2025-03-20 PROCEDURE — 80053 COMPREHEN METABOLIC PANEL: CPT

## 2025-03-20 PROCEDURE — 96367 TX/PROPH/DG ADDL SEQ IV INF: CPT

## 2025-03-20 PROCEDURE — 96413 CHEMO IV INFUSION 1 HR: CPT

## 2025-03-20 PROCEDURE — 96375 TX/PRO/DX INJ NEW DRUG ADDON: CPT

## 2025-03-20 RX ORDER — LIDOCAINE HYDROCHLORIDE 20 MG/ML
1 JELLY TOPICAL ONCE
Refills: 0 | Status: COMPLETED | OUTPATIENT
Start: 2025-03-20 | End: 2025-03-20

## 2025-03-20 RX ORDER — DEXAMETHASONE 0.5 MG/1
12 TABLET ORAL ONCE
Refills: 0 | Status: COMPLETED | OUTPATIENT
Start: 2025-03-20 | End: 2025-03-20

## 2025-03-20 RX ORDER — IRON SUCROSE 20 MG/ML
300 INJECTION, SOLUTION INTRAVENOUS ONCE
Refills: 0 | Status: COMPLETED | OUTPATIENT
Start: 2025-03-20 | End: 2025-03-20

## 2025-03-20 RX ORDER — PALONOSETRON HYDROCHLORIDE 0.05 MG/ML
0.25 INJECTION, SOLUTION INTRAVENOUS ONCE
Refills: 0 | Status: COMPLETED | OUTPATIENT
Start: 2025-03-20 | End: 2025-03-20

## 2025-03-20 RX ORDER — ENFORTUMAB VEDOTIN 30 MG/3.3ML
90 INJECTION, POWDER, LYOPHILIZED, FOR SOLUTION INTRAVENOUS ONCE
Refills: 0 | Status: COMPLETED | OUTPATIENT
Start: 2025-03-20 | End: 2025-03-20

## 2025-03-20 RX ADMIN — ENFORTUMAB VEDOTIN 90 MILLIGRAM(S): 30 INJECTION, POWDER, LYOPHILIZED, FOR SOLUTION INTRAVENOUS at 18:30

## 2025-03-20 RX ADMIN — ENFORTUMAB VEDOTIN 90 MILLIGRAM(S): 30 INJECTION, POWDER, LYOPHILIZED, FOR SOLUTION INTRAVENOUS at 17:56

## 2025-03-20 RX ADMIN — Medication 10 MILLILITER(S): at 18:35

## 2025-03-20 RX ADMIN — PALONOSETRON HYDROCHLORIDE 0.25 MILLIGRAM(S): 0.05 INJECTION, SOLUTION INTRAVENOUS at 17:56

## 2025-03-20 RX ADMIN — DEXAMETHASONE 212 MILLIGRAM(S): 0.5 TABLET ORAL at 17:30

## 2025-03-20 RX ADMIN — IRON SUCROSE 300 MILLIGRAM(S): 20 INJECTION, SOLUTION INTRAVENOUS at 17:02

## 2025-03-20 RX ADMIN — DEXAMETHASONE 12 MILLIGRAM(S): 0.5 TABLET ORAL at 17:45

## 2025-03-20 RX ADMIN — IRON SUCROSE 176.67 MILLIGRAM(S): 20 INJECTION, SOLUTION INTRAVENOUS at 15:32

## 2025-04-02 RX ORDER — LIDOCAINE HYDROCHLORIDE 20 MG/ML
1 JELLY TOPICAL ONCE
Refills: 0 | Status: COMPLETED | OUTPATIENT
Start: 2025-04-03 | End: 2025-04-03

## 2025-04-03 ENCOUNTER — APPOINTMENT (OUTPATIENT)
Dept: INFUSION THERAPY | Facility: CLINIC | Age: 70
End: 2025-04-03

## 2025-04-03 ENCOUNTER — OUTPATIENT (OUTPATIENT)
Dept: OUTPATIENT SERVICES | Facility: HOSPITAL | Age: 70
LOS: 1 days | End: 2025-04-03
Payer: MEDICARE

## 2025-04-03 ENCOUNTER — APPOINTMENT (OUTPATIENT)
Dept: HEMATOLOGY ONCOLOGY | Facility: CLINIC | Age: 70
End: 2025-04-03
Payer: MEDICARE

## 2025-04-03 VITALS
OXYGEN SATURATION: 98 % | RESPIRATION RATE: 18 BRPM | DIASTOLIC BLOOD PRESSURE: 75 MMHG | HEART RATE: 78 BPM | SYSTOLIC BLOOD PRESSURE: 119 MMHG | TEMPERATURE: 98 F

## 2025-04-03 VITALS
OXYGEN SATURATION: 96 % | WEIGHT: 160.94 LBS | RESPIRATION RATE: 17 BRPM | SYSTOLIC BLOOD PRESSURE: 130 MMHG | HEIGHT: 62.5 IN | TEMPERATURE: 98 F | HEART RATE: 72 BPM | DIASTOLIC BLOOD PRESSURE: 83 MMHG

## 2025-04-03 VITALS
TEMPERATURE: 98 F | DIASTOLIC BLOOD PRESSURE: 82 MMHG | WEIGHT: 160 LBS | SYSTOLIC BLOOD PRESSURE: 137 MMHG | BODY MASS INDEX: 28.35 KG/M2 | RESPIRATION RATE: 18 BRPM | OXYGEN SATURATION: 96 % | HEART RATE: 90 BPM | HEIGHT: 62.99 IN

## 2025-04-03 DIAGNOSIS — N75.0 CYST OF BARTHOLIN'S GLAND: Chronic | ICD-10-CM

## 2025-04-03 DIAGNOSIS — Z98.890 OTHER SPECIFIED POSTPROCEDURAL STATES: Chronic | ICD-10-CM

## 2025-04-03 DIAGNOSIS — Z90.79 ACQUIRED ABSENCE OF OTHER GENITAL ORGAN(S): Chronic | ICD-10-CM

## 2025-04-03 DIAGNOSIS — C67.9 MALIGNANT NEOPLASM OF BLADDER, UNSPECIFIED: ICD-10-CM

## 2025-04-03 LAB
ALBUMIN SERPL ELPH-MCNC: 3.6 G/DL
ALP BLD-CCNC: 87 U/L
ALT SERPL-CCNC: 8 U/L
ANION GAP SERPL CALC-SCNC: 3 MMOL/L
AST SERPL-CCNC: 18 U/L
BILIRUB SERPL-MCNC: 0.5 MG/DL
BUN SERPL-MCNC: 9 MG/DL
CALCIUM SERPL-MCNC: 9.8 MG/DL
CHLORIDE SERPL-SCNC: 112 MMOL/L
CO2 SERPL-SCNC: 22 MMOL/L
CREAT SERPL-MCNC: 0.7 MG/DL
EGFRCR SERPLBLD CKD-EPI 2021: 92 ML/MIN/1.73M2
GLUCOSE SERPL-MCNC: 111 MG/DL
HCT VFR BLD CALC: 31.6 %
HGB BLD-MCNC: 10.3 G/DL
LYMPHOCYTES # BLD AUTO: 1.8 K/UL
LYMPHOCYTES NFR BLD AUTO: 16.3 %
MAN DIFF?: NO
MCHC RBC-ENTMCNC: 26.5 PG
MCHC RBC-ENTMCNC: 32.6 G/DL
MCV RBC AUTO: 81.4 FL
NEUTROPHILS # BLD AUTO: 8 K/UL
NEUTROPHILS NFR BLD AUTO: 71.8 %
PLATELET # BLD AUTO: 428 K/UL
POTASSIUM SERPL-SCNC: 4 MMOL/L
PROT SERPL-MCNC: 7.5 G/DL
RBC # BLD: 3.88 M/UL
RBC # FLD: 17.9 %
SODIUM SERPL-SCNC: 137 MMOL/L
WBC # FLD AUTO: 11.1 K/UL

## 2025-04-03 PROCEDURE — 96375 TX/PRO/DX INJ NEW DRUG ADDON: CPT

## 2025-04-03 PROCEDURE — 96417 CHEMO IV INFUS EACH ADDL SEQ: CPT

## 2025-04-03 PROCEDURE — 96413 CHEMO IV INFUSION 1 HR: CPT

## 2025-04-03 PROCEDURE — 36415 COLL VENOUS BLD VENIPUNCTURE: CPT

## 2025-04-03 PROCEDURE — 99214 OFFICE O/P EST MOD 30 MIN: CPT | Mod: 25

## 2025-04-03 RX ORDER — PALONOSETRON HYDROCHLORIDE 0.05 MG/ML
0.25 INJECTION, SOLUTION INTRAVENOUS ONCE
Refills: 0 | Status: COMPLETED | OUTPATIENT
Start: 2025-04-03 | End: 2025-04-03

## 2025-04-03 RX ORDER — PEMBROLIZUMAB 50 MG/2ML
200 INJECTION, POWDER, LYOPHILIZED, FOR SOLUTION INTRAVENOUS ONCE
Refills: 0 | Status: COMPLETED | OUTPATIENT
Start: 2025-04-03 | End: 2025-04-03

## 2025-04-03 RX ORDER — DEXAMETHASONE 0.5 MG/1
12 TABLET ORAL ONCE
Refills: 0 | Status: COMPLETED | OUTPATIENT
Start: 2025-04-03 | End: 2025-04-03

## 2025-04-03 RX ORDER — ENFORTUMAB VEDOTIN 30 MG/3.3ML
90 INJECTION, POWDER, LYOPHILIZED, FOR SOLUTION INTRAVENOUS ONCE
Refills: 0 | Status: COMPLETED | OUTPATIENT
Start: 2025-04-03 | End: 2025-04-03

## 2025-04-03 RX ADMIN — ENFORTUMAB VEDOTIN 90 MILLIGRAM(S): 30 INJECTION, POWDER, LYOPHILIZED, FOR SOLUTION INTRAVENOUS at 11:30

## 2025-04-03 RX ADMIN — PEMBROLIZUMAB 200 MILLIGRAM(S): 50 INJECTION, POWDER, LYOPHILIZED, FOR SOLUTION INTRAVENOUS at 10:12

## 2025-04-03 RX ADMIN — Medication 10 MILLILITER(S): at 09:06

## 2025-04-03 RX ADMIN — PALONOSETRON HYDROCHLORIDE 0.25 MILLIGRAM(S): 0.05 INJECTION, SOLUTION INTRAVENOUS at 09:12

## 2025-04-03 RX ADMIN — PEMBROLIZUMAB 200 MILLIGRAM(S): 50 INJECTION, POWDER, LYOPHILIZED, FOR SOLUTION INTRAVENOUS at 10:49

## 2025-04-03 RX ADMIN — DEXAMETHASONE 212 MILLIGRAM(S): 0.5 TABLET ORAL at 09:14

## 2025-04-03 RX ADMIN — DEXAMETHASONE 12 MILLIGRAM(S): 0.5 TABLET ORAL at 09:30

## 2025-04-03 RX ADMIN — ENFORTUMAB VEDOTIN 90 MILLIGRAM(S): 30 INJECTION, POWDER, LYOPHILIZED, FOR SOLUTION INTRAVENOUS at 10:50

## 2025-04-10 ENCOUNTER — OUTPATIENT (OUTPATIENT)
Dept: OUTPATIENT SERVICES | Facility: HOSPITAL | Age: 70
LOS: 1 days | End: 2025-04-10
Payer: MEDICARE

## 2025-04-10 ENCOUNTER — NON-APPOINTMENT (OUTPATIENT)
Age: 70
End: 2025-04-10

## 2025-04-10 ENCOUNTER — APPOINTMENT (OUTPATIENT)
Dept: INFUSION THERAPY | Facility: CLINIC | Age: 70
End: 2025-04-10

## 2025-04-10 ENCOUNTER — APPOINTMENT (OUTPATIENT)
Dept: UROLOGY | Facility: CLINIC | Age: 70
End: 2025-04-10
Payer: MEDICARE

## 2025-04-10 VITALS
OXYGEN SATURATION: 98 % | TEMPERATURE: 98 F | RESPIRATION RATE: 18 BRPM | HEART RATE: 89 BPM | DIASTOLIC BLOOD PRESSURE: 75 MMHG | SYSTOLIC BLOOD PRESSURE: 122 MMHG

## 2025-04-10 VITALS
HEIGHT: 62.99 IN | HEART RATE: 103 BPM | DIASTOLIC BLOOD PRESSURE: 69 MMHG | WEIGHT: 160 LBS | BODY MASS INDEX: 28.35 KG/M2 | SYSTOLIC BLOOD PRESSURE: 111 MMHG | TEMPERATURE: 95.5 F

## 2025-04-10 VITALS
TEMPERATURE: 99 F | HEART RATE: 101 BPM | OXYGEN SATURATION: 97 % | HEIGHT: 62 IN | DIASTOLIC BLOOD PRESSURE: 69 MMHG | SYSTOLIC BLOOD PRESSURE: 115 MMHG | WEIGHT: 160.06 LBS | RESPIRATION RATE: 18 BRPM

## 2025-04-10 DIAGNOSIS — Z98.890 OTHER SPECIFIED POSTPROCEDURAL STATES: Chronic | ICD-10-CM

## 2025-04-10 DIAGNOSIS — Z90.79 ACQUIRED ABSENCE OF OTHER GENITAL ORGAN(S): Chronic | ICD-10-CM

## 2025-04-10 DIAGNOSIS — N75.0 CYST OF BARTHOLIN'S GLAND: Chronic | ICD-10-CM

## 2025-04-10 DIAGNOSIS — C67.9 MALIGNANT NEOPLASM OF BLADDER, UNSPECIFIED: ICD-10-CM

## 2025-04-10 LAB
ALBUMIN SERPL ELPH-MCNC: 3.4 G/DL — SIGNIFICANT CHANGE UP (ref 3.3–5)
ALP SERPL-CCNC: 89 U/L — SIGNIFICANT CHANGE UP (ref 40–120)
ALT FLD-CCNC: 10 U/L — SIGNIFICANT CHANGE UP (ref 10–45)
ANION GAP SERPL CALC-SCNC: 3 MMOL/L — LOW (ref 5–17)
AST SERPL-CCNC: 20 U/L — SIGNIFICANT CHANGE UP (ref 10–40)
BILIRUB SERPL-MCNC: 0.5 MG/DL — SIGNIFICANT CHANGE UP (ref 0.2–1.2)
BUN SERPL-MCNC: 9 MG/DL — SIGNIFICANT CHANGE UP (ref 7–23)
CALCIUM SERPL-MCNC: 9.7 MG/DL — SIGNIFICANT CHANGE UP (ref 8.4–10.5)
CHLORIDE SERPL-SCNC: 109 MMOL/L — HIGH (ref 96–108)
CO2 SERPL-SCNC: 25 MMOL/L — SIGNIFICANT CHANGE UP (ref 22–31)
CREAT SERPL-MCNC: 0.7 MG/DL — SIGNIFICANT CHANGE UP (ref 0.5–1.3)
EGFR: 92 ML/MIN/1.73M2 — SIGNIFICANT CHANGE UP
EGFR: 92 ML/MIN/1.73M2 — SIGNIFICANT CHANGE UP
GLUCOSE SERPL-MCNC: 173 MG/DL — HIGH (ref 70–99)
HCT VFR BLD CALC: 32.3 % — LOW (ref 34.5–45)
HGB BLD-MCNC: 10.1 G/DL — LOW (ref 11.5–15.5)
LYMPHOCYTES # BLD AUTO: 1.9 K/UL — SIGNIFICANT CHANGE UP (ref 1–3.3)
LYMPHOCYTES # BLD AUTO: 15.9 % — SIGNIFICANT CHANGE UP (ref 13–44)
MAGNESIUM SERPL-MCNC: 2 MG/DL — SIGNIFICANT CHANGE UP (ref 1.6–2.6)
MCHC RBC-ENTMCNC: 25.8 PG — LOW (ref 27–34)
MCHC RBC-ENTMCNC: 31.3 G/DL — LOW (ref 32–36)
MCV RBC AUTO: 82.4 FL — SIGNIFICANT CHANGE UP (ref 80–100)
NEUTROPHILS # BLD AUTO: 9.6 K/UL — HIGH (ref 1.8–7.4)
NEUTROPHILS NFR BLD AUTO: 80.1 % — HIGH (ref 43–77)
PLATELET # BLD AUTO: 423 K/UL — HIGH (ref 150–400)
POTASSIUM SERPL-MCNC: 4.1 MMOL/L — SIGNIFICANT CHANGE UP (ref 3.5–5.3)
POTASSIUM SERPL-SCNC: 4.1 MMOL/L — SIGNIFICANT CHANGE UP (ref 3.5–5.3)
PROT SERPL-MCNC: 7.4 G/DL — SIGNIFICANT CHANGE UP (ref 6–8.3)
RBC # BLD: 3.92 M/UL — SIGNIFICANT CHANGE UP (ref 3.8–5.2)
RBC # FLD: 18 % — HIGH (ref 10.3–14.5)
SODIUM SERPL-SCNC: 137 MMOL/L — SIGNIFICANT CHANGE UP (ref 135–145)
T4 FREE SERPL-MCNC: 1.2 NG/DL — SIGNIFICANT CHANGE UP (ref 0.93–1.7)
TSH SERPL-MCNC: 2.46 UIU/ML — SIGNIFICANT CHANGE UP (ref 0.27–4.2)
WBC # BLD: 12 K/UL — HIGH (ref 3.8–10.5)
WBC # FLD AUTO: 12 K/UL — HIGH (ref 3.8–10.5)

## 2025-04-10 PROCEDURE — 83735 ASSAY OF MAGNESIUM: CPT

## 2025-04-10 PROCEDURE — 80053 COMPREHEN METABOLIC PANEL: CPT

## 2025-04-10 PROCEDURE — 96375 TX/PRO/DX INJ NEW DRUG ADDON: CPT

## 2025-04-10 PROCEDURE — 84439 ASSAY OF FREE THYROXINE: CPT

## 2025-04-10 PROCEDURE — 84443 ASSAY THYROID STIM HORMONE: CPT

## 2025-04-10 PROCEDURE — 85025 COMPLETE CBC W/AUTO DIFF WBC: CPT

## 2025-04-10 PROCEDURE — 96413 CHEMO IV INFUSION 1 HR: CPT

## 2025-04-10 PROCEDURE — 36415 COLL VENOUS BLD VENIPUNCTURE: CPT

## 2025-04-10 PROCEDURE — 99214 OFFICE O/P EST MOD 30 MIN: CPT

## 2025-04-10 RX ORDER — LIDOCAINE HYDROCHLORIDE 20 MG/ML
1 JELLY TOPICAL ONCE
Refills: 0 | Status: COMPLETED | OUTPATIENT
Start: 2025-04-10 | End: 2025-04-10

## 2025-04-10 RX ORDER — ENFORTUMAB VEDOTIN 30 MG/3.3ML
90 INJECTION, POWDER, LYOPHILIZED, FOR SOLUTION INTRAVENOUS ONCE
Refills: 0 | Status: COMPLETED | OUTPATIENT
Start: 2025-04-10 | End: 2025-04-10

## 2025-04-10 RX ORDER — PALONOSETRON HYDROCHLORIDE 0.05 MG/ML
0.25 INJECTION, SOLUTION INTRAVENOUS ONCE
Refills: 0 | Status: COMPLETED | OUTPATIENT
Start: 2025-04-10 | End: 2025-04-10

## 2025-04-10 RX ORDER — DEXAMETHASONE 0.5 MG/1
12 TABLET ORAL ONCE
Refills: 0 | Status: COMPLETED | OUTPATIENT
Start: 2025-04-10 | End: 2025-04-10

## 2025-04-10 RX ADMIN — Medication 10 MILLILITER(S): at 14:55

## 2025-04-10 RX ADMIN — ENFORTUMAB VEDOTIN 90 MILLIGRAM(S): 30 INJECTION, POWDER, LYOPHILIZED, FOR SOLUTION INTRAVENOUS at 15:55

## 2025-04-10 RX ADMIN — DEXAMETHASONE 12 MILLIGRAM(S): 0.5 TABLET ORAL at 14:15

## 2025-04-10 RX ADMIN — PALONOSETRON HYDROCHLORIDE 0.25 MILLIGRAM(S): 0.05 INJECTION, SOLUTION INTRAVENOUS at 14:15

## 2025-04-10 RX ADMIN — DEXAMETHASONE 212 MILLIGRAM(S): 0.5 TABLET ORAL at 14:00

## 2025-04-10 RX ADMIN — ENFORTUMAB VEDOTIN 90 MILLIGRAM(S): 30 INJECTION, POWDER, LYOPHILIZED, FOR SOLUTION INTRAVENOUS at 14:21

## 2025-04-16 ENCOUNTER — APPOINTMENT (OUTPATIENT)
Dept: HEMATOLOGY ONCOLOGY | Facility: CLINIC | Age: 70
End: 2025-04-16

## 2025-04-23 ENCOUNTER — NON-APPOINTMENT (OUTPATIENT)
Age: 70
End: 2025-04-23

## 2025-04-24 ENCOUNTER — APPOINTMENT (OUTPATIENT)
Dept: HEMATOLOGY ONCOLOGY | Facility: CLINIC | Age: 70
End: 2025-04-24
Payer: MEDICARE

## 2025-04-24 ENCOUNTER — APPOINTMENT (OUTPATIENT)
Dept: INFUSION THERAPY | Facility: CLINIC | Age: 70
End: 2025-04-24

## 2025-04-24 ENCOUNTER — OUTPATIENT (OUTPATIENT)
Dept: OUTPATIENT SERVICES | Facility: HOSPITAL | Age: 70
LOS: 1 days | End: 2025-04-24
Payer: MEDICARE

## 2025-04-24 VITALS
HEIGHT: 63 IN | BODY MASS INDEX: 28.7 KG/M2 | WEIGHT: 162 LBS | RESPIRATION RATE: 18 BRPM | DIASTOLIC BLOOD PRESSURE: 66 MMHG | HEART RATE: 92 BPM | SYSTOLIC BLOOD PRESSURE: 136 MMHG | OXYGEN SATURATION: 99 % | TEMPERATURE: 98 F

## 2025-04-24 VITALS
TEMPERATURE: 98 F | HEART RATE: 70 BPM | RESPIRATION RATE: 18 BRPM | SYSTOLIC BLOOD PRESSURE: 135 MMHG | WEIGHT: 162.04 LBS | OXYGEN SATURATION: 97 % | HEIGHT: 63 IN | DIASTOLIC BLOOD PRESSURE: 86 MMHG

## 2025-04-24 VITALS
OXYGEN SATURATION: 95 % | RESPIRATION RATE: 18 BRPM | DIASTOLIC BLOOD PRESSURE: 77 MMHG | SYSTOLIC BLOOD PRESSURE: 116 MMHG | HEART RATE: 86 BPM | TEMPERATURE: 98 F

## 2025-04-24 DIAGNOSIS — K59.00 CONSTIPATION, UNSPECIFIED: ICD-10-CM

## 2025-04-24 DIAGNOSIS — Z90.79 ACQUIRED ABSENCE OF OTHER GENITAL ORGAN(S): Chronic | ICD-10-CM

## 2025-04-24 DIAGNOSIS — N75.0 CYST OF BARTHOLIN'S GLAND: Chronic | ICD-10-CM

## 2025-04-24 DIAGNOSIS — M79.10 MYALGIA, UNSPECIFIED SITE: ICD-10-CM

## 2025-04-24 DIAGNOSIS — D64.9 ANEMIA, UNSPECIFIED: ICD-10-CM

## 2025-04-24 DIAGNOSIS — Z98.890 OTHER SPECIFIED POSTPROCEDURAL STATES: Chronic | ICD-10-CM

## 2025-04-24 DIAGNOSIS — C67.9 MALIGNANT NEOPLASM OF BLADDER, UNSPECIFIED: ICD-10-CM

## 2025-04-24 LAB
ALBUMIN SERPL ELPH-MCNC: 3.4 G/DL
ALP BLD-CCNC: 72 U/L
ALT SERPL-CCNC: 20 U/L
ANION GAP SERPL CALC-SCNC: 4 MMOL/L
AST SERPL-CCNC: 26 U/L
BILIRUB SERPL-MCNC: 0.5 MG/DL
BUN SERPL-MCNC: 9 MG/DL
CALCIUM SERPL-MCNC: 9.4 MG/DL
CHLORIDE SERPL-SCNC: 112 MMOL/L
CO2 SERPL-SCNC: 24 MMOL/L
CREAT SERPL-MCNC: 0.4 MG/DL
EGFRCR SERPLBLD CKD-EPI 2021: 105 ML/MIN/1.73M2
GLUCOSE SERPL-MCNC: 105 MG/DL
HCT VFR BLD CALC: 31.8 %
HGB BLD-MCNC: 10.1 G/DL
LYMPHOCYTES # BLD AUTO: 2 K/UL
LYMPHOCYTES NFR BLD AUTO: 17.4 %
MAN DIFF?: NO
MCHC RBC-ENTMCNC: 26.3 PG
MCHC RBC-ENTMCNC: 31.8 G/DL
MCV RBC AUTO: 82.8 FL
NEUTROPHILS # BLD AUTO: 8 K/UL
NEUTROPHILS NFR BLD AUTO: 71.1 %
PLATELET # BLD AUTO: 439 K/UL
POTASSIUM SERPL-SCNC: 4.5 MMOL/L
PROT SERPL-MCNC: 7.2 G/DL
RBC # BLD: 3.84 M/UL
RBC # FLD: 19.3 %
SODIUM SERPL-SCNC: 140 MMOL/L
WBC # FLD AUTO: 11.3 K/UL

## 2025-04-24 PROCEDURE — 99214 OFFICE O/P EST MOD 30 MIN: CPT | Mod: 25

## 2025-04-24 PROCEDURE — 36415 COLL VENOUS BLD VENIPUNCTURE: CPT

## 2025-04-24 PROCEDURE — 96413 CHEMO IV INFUSION 1 HR: CPT

## 2025-04-24 PROCEDURE — 96417 CHEMO IV INFUS EACH ADDL SEQ: CPT

## 2025-04-24 PROCEDURE — 96375 TX/PRO/DX INJ NEW DRUG ADDON: CPT

## 2025-04-24 RX ORDER — NAPROXEN 500 MG/1
500 TABLET ORAL TWICE DAILY
Qty: 60 | Refills: 0 | Status: ACTIVE | COMMUNITY
Start: 2025-04-24 | End: 1900-01-01

## 2025-04-24 RX ORDER — DEXAMETHASONE 0.5 MG/1
12 TABLET ORAL ONCE
Refills: 0 | Status: COMPLETED | OUTPATIENT
Start: 2025-04-24 | End: 2025-04-24

## 2025-04-24 RX ORDER — LIDOCAINE HYDROCHLORIDE 20 MG/ML
2 JELLY TOPICAL
Qty: 15 | Refills: 3 | Status: ACTIVE | COMMUNITY
Start: 2025-04-18 | End: 1900-01-01

## 2025-04-24 RX ORDER — PALONOSETRON HYDROCHLORIDE 0.05 MG/ML
0.25 INJECTION, SOLUTION INTRAVENOUS ONCE
Refills: 0 | Status: COMPLETED | OUTPATIENT
Start: 2025-04-24 | End: 2025-04-24

## 2025-04-24 RX ORDER — NAPROXEN SODIUM 550 MG/1
550 TABLET ORAL TWICE DAILY
Qty: 60 | Refills: 2 | Status: DISCONTINUED | COMMUNITY
Start: 2025-04-24 | End: 2025-04-24

## 2025-04-24 RX ORDER — ENFORTUMAB VEDOTIN 30 MG/3.3ML
90 INJECTION, POWDER, LYOPHILIZED, FOR SOLUTION INTRAVENOUS ONCE
Refills: 0 | Status: COMPLETED | OUTPATIENT
Start: 2025-04-24 | End: 2025-04-24

## 2025-04-24 RX ORDER — LIDOCAINE HYDROCHLORIDE 20 MG/ML
1 JELLY TOPICAL ONCE
Refills: 0 | Status: COMPLETED | OUTPATIENT
Start: 2025-04-24 | End: 2025-04-24

## 2025-04-24 RX ORDER — PEMBROLIZUMAB 50 MG/2ML
200 INJECTION, POWDER, LYOPHILIZED, FOR SOLUTION INTRAVENOUS ONCE
Refills: 0 | Status: COMPLETED | OUTPATIENT
Start: 2025-04-24 | End: 2025-04-24

## 2025-04-24 RX ORDER — ALTEPLASE 2.2 MG/2ML
2 INJECTION, POWDER, LYOPHILIZED, FOR SOLUTION INTRAVENOUS ONCE
Refills: 0 | Status: COMPLETED | OUTPATIENT
Start: 2025-04-24 | End: 2025-04-24

## 2025-04-24 RX ADMIN — ALTEPLASE 2 MILLIGRAM(S): 2.2 INJECTION, POWDER, LYOPHILIZED, FOR SOLUTION INTRAVENOUS at 12:36

## 2025-04-24 RX ADMIN — ENFORTUMAB VEDOTIN 90 MILLIGRAM(S): 30 INJECTION, POWDER, LYOPHILIZED, FOR SOLUTION INTRAVENOUS at 13:20

## 2025-04-24 RX ADMIN — Medication 10 MILLILITER(S): at 13:52

## 2025-04-24 RX ADMIN — DEXAMETHASONE 212 MILLIGRAM(S): 0.5 TABLET ORAL at 13:07

## 2025-04-24 RX ADMIN — ENFORTUMAB VEDOTIN 90 MILLIGRAM(S): 30 INJECTION, POWDER, LYOPHILIZED, FOR SOLUTION INTRAVENOUS at 13:50

## 2025-04-24 RX ADMIN — PALONOSETRON HYDROCHLORIDE 0.25 MILLIGRAM(S): 0.05 INJECTION, SOLUTION INTRAVENOUS at 13:05

## 2025-04-24 RX ADMIN — PEMBROLIZUMAB 200 MILLIGRAM(S): 50 INJECTION, POWDER, LYOPHILIZED, FOR SOLUTION INTRAVENOUS at 12:45

## 2025-04-24 RX ADMIN — PEMBROLIZUMAB 200 MILLIGRAM(S): 50 INJECTION, POWDER, LYOPHILIZED, FOR SOLUTION INTRAVENOUS at 12:15

## 2025-04-24 RX ADMIN — DEXAMETHASONE 12 MILLIGRAM(S): 0.5 TABLET ORAL at 13:22

## 2025-04-24 NOTE — DISCHARGE INSTRUCTIONS: CHEMOTHERAPY - NSFACILITYCONTAFTRHOUR_HEME_A_AMB
[facility] ([phone])

## 2025-04-25 LAB — TSH SERPL-ACNC: 2.9 UIU/ML

## 2025-05-01 ENCOUNTER — OUTPATIENT (OUTPATIENT)
Dept: OUTPATIENT SERVICES | Facility: HOSPITAL | Age: 70
LOS: 1 days | End: 2025-05-01
Payer: MEDICARE

## 2025-05-01 ENCOUNTER — APPOINTMENT (OUTPATIENT)
Dept: INFUSION THERAPY | Facility: CLINIC | Age: 70
End: 2025-05-01

## 2025-05-01 ENCOUNTER — NON-APPOINTMENT (OUTPATIENT)
Age: 70
End: 2025-05-01

## 2025-05-01 VITALS
SYSTOLIC BLOOD PRESSURE: 122 MMHG | DIASTOLIC BLOOD PRESSURE: 76 MMHG | HEIGHT: 63 IN | OXYGEN SATURATION: 96 % | HEART RATE: 84 BPM | TEMPERATURE: 98 F | WEIGHT: 162.04 LBS | RESPIRATION RATE: 17 BRPM

## 2025-05-01 DIAGNOSIS — N75.0 CYST OF BARTHOLIN'S GLAND: Chronic | ICD-10-CM

## 2025-05-01 DIAGNOSIS — C67.9 MALIGNANT NEOPLASM OF BLADDER, UNSPECIFIED: ICD-10-CM

## 2025-05-01 DIAGNOSIS — Z90.79 ACQUIRED ABSENCE OF OTHER GENITAL ORGAN(S): Chronic | ICD-10-CM

## 2025-05-01 DIAGNOSIS — Z98.890 OTHER SPECIFIED POSTPROCEDURAL STATES: Chronic | ICD-10-CM

## 2025-05-01 LAB
ALBUMIN SERPL ELPH-MCNC: 3.2 G/DL — LOW (ref 3.3–5)
ALP SERPL-CCNC: 75 U/L — SIGNIFICANT CHANGE UP (ref 40–120)
ALT FLD-CCNC: 19 U/L — SIGNIFICANT CHANGE UP (ref 10–45)
ANION GAP SERPL CALC-SCNC: 0 MMOL/L — LOW (ref 5–17)
AST SERPL-CCNC: 29 U/L — SIGNIFICANT CHANGE UP (ref 10–40)
BILIRUB SERPL-MCNC: 0.5 MG/DL — SIGNIFICANT CHANGE UP (ref 0.2–1.2)
BUN SERPL-MCNC: 13 MG/DL — SIGNIFICANT CHANGE UP (ref 7–23)
CALCIUM SERPL-MCNC: 9.7 MG/DL — SIGNIFICANT CHANGE UP (ref 8.4–10.5)
CHLORIDE SERPL-SCNC: 111 MMOL/L — HIGH (ref 96–108)
CO2 SERPL-SCNC: 26 MMOL/L — SIGNIFICANT CHANGE UP (ref 22–31)
CREAT SERPL-MCNC: 0.6 MG/DL — SIGNIFICANT CHANGE UP (ref 0.5–1.3)
EGFR: 95 ML/MIN/1.73M2 — SIGNIFICANT CHANGE UP
EGFR: 95 ML/MIN/1.73M2 — SIGNIFICANT CHANGE UP
GLUCOSE SERPL-MCNC: 155 MG/DL — HIGH (ref 70–99)
HCT VFR BLD CALC: 30.8 % — LOW (ref 34.5–45)
HGB BLD-MCNC: 9.8 G/DL — LOW (ref 11.5–15.5)
LYMPHOCYTES # BLD AUTO: 1.8 K/UL — SIGNIFICANT CHANGE UP (ref 1–3.3)
LYMPHOCYTES # BLD AUTO: 18.6 % — SIGNIFICANT CHANGE UP (ref 13–44)
MAGNESIUM SERPL-MCNC: 2.1 MG/DL — SIGNIFICANT CHANGE UP (ref 1.6–2.6)
MCHC RBC-ENTMCNC: 26.5 PG — LOW (ref 27–34)
MCHC RBC-ENTMCNC: 31.8 G/DL — LOW (ref 32–36)
MCV RBC AUTO: 83.2 FL — SIGNIFICANT CHANGE UP (ref 80–100)
NEUTROPHILS # BLD AUTO: 6.8 K/UL — SIGNIFICANT CHANGE UP (ref 1.8–7.4)
NEUTROPHILS NFR BLD AUTO: 71.1 % — SIGNIFICANT CHANGE UP (ref 43–77)
PLATELET # BLD AUTO: 377 K/UL — SIGNIFICANT CHANGE UP (ref 150–400)
POTASSIUM SERPL-MCNC: 4.1 MMOL/L — SIGNIFICANT CHANGE UP (ref 3.5–5.3)
POTASSIUM SERPL-SCNC: 4.1 MMOL/L — SIGNIFICANT CHANGE UP (ref 3.5–5.3)
PROT SERPL-MCNC: 7.1 G/DL — SIGNIFICANT CHANGE UP (ref 6–8.3)
RBC # BLD: 3.7 M/UL — LOW (ref 3.8–5.2)
RBC # FLD: 19.8 % — HIGH (ref 10.3–14.5)
SODIUM SERPL-SCNC: 137 MMOL/L — SIGNIFICANT CHANGE UP (ref 135–145)
WBC # BLD: 9.6 K/UL — SIGNIFICANT CHANGE UP (ref 3.8–10.5)
WBC # FLD AUTO: 9.6 K/UL — SIGNIFICANT CHANGE UP (ref 3.8–10.5)

## 2025-05-01 PROCEDURE — 96413 CHEMO IV INFUSION 1 HR: CPT

## 2025-05-01 PROCEDURE — 80053 COMPREHEN METABOLIC PANEL: CPT

## 2025-05-01 PROCEDURE — 36415 COLL VENOUS BLD VENIPUNCTURE: CPT

## 2025-05-01 PROCEDURE — 85025 COMPLETE CBC W/AUTO DIFF WBC: CPT

## 2025-05-01 PROCEDURE — 96375 TX/PRO/DX INJ NEW DRUG ADDON: CPT

## 2025-05-01 PROCEDURE — 83735 ASSAY OF MAGNESIUM: CPT

## 2025-05-01 RX ORDER — PALONOSETRON HYDROCHLORIDE 0.05 MG/ML
0.25 INJECTION, SOLUTION INTRAVENOUS ONCE
Refills: 0 | Status: COMPLETED | OUTPATIENT
Start: 2025-05-01 | End: 2025-05-01

## 2025-05-01 RX ORDER — LIDOCAINE HYDROCHLORIDE 20 MG/ML
1 JELLY TOPICAL ONCE
Refills: 0 | Status: COMPLETED | OUTPATIENT
Start: 2025-05-01 | End: 2025-05-01

## 2025-05-01 RX ORDER — DEXAMETHASONE 0.5 MG/1
12 TABLET ORAL ONCE
Refills: 0 | Status: COMPLETED | OUTPATIENT
Start: 2025-05-01 | End: 2025-05-01

## 2025-05-01 RX ORDER — ENFORTUMAB VEDOTIN 30 MG/3.3ML
90 INJECTION, POWDER, LYOPHILIZED, FOR SOLUTION INTRAVENOUS ONCE
Refills: 0 | Status: COMPLETED | OUTPATIENT
Start: 2025-05-01 | End: 2025-05-01

## 2025-05-01 RX ADMIN — Medication 10 MILLILITER(S): at 11:30

## 2025-05-01 RX ADMIN — DEXAMETHASONE 12 MILLIGRAM(S): 0.5 TABLET ORAL at 10:40

## 2025-05-01 RX ADMIN — ENFORTUMAB VEDOTIN 90 MILLIGRAM(S): 30 INJECTION, POWDER, LYOPHILIZED, FOR SOLUTION INTRAVENOUS at 11:25

## 2025-05-01 RX ADMIN — ENFORTUMAB VEDOTIN 90 MILLIGRAM(S): 30 INJECTION, POWDER, LYOPHILIZED, FOR SOLUTION INTRAVENOUS at 10:49

## 2025-05-01 RX ADMIN — DEXAMETHASONE 212 MILLIGRAM(S): 0.5 TABLET ORAL at 10:15

## 2025-05-01 RX ADMIN — PALONOSETRON HYDROCHLORIDE 0.25 MILLIGRAM(S): 0.05 INJECTION, SOLUTION INTRAVENOUS at 10:14

## 2025-05-06 ENCOUNTER — NON-APPOINTMENT (OUTPATIENT)
Age: 70
End: 2025-05-06

## 2025-05-09 ENCOUNTER — APPOINTMENT (OUTPATIENT)
Dept: NUCLEAR MEDICINE | Facility: HOSPITAL | Age: 70
End: 2025-05-09

## 2025-05-09 ENCOUNTER — OUTPATIENT (OUTPATIENT)
Dept: OUTPATIENT SERVICES | Facility: HOSPITAL | Age: 70
LOS: 1 days | End: 2025-05-09
Payer: MEDICARE

## 2025-05-09 DIAGNOSIS — Z90.79 ACQUIRED ABSENCE OF OTHER GENITAL ORGAN(S): Chronic | ICD-10-CM

## 2025-05-09 DIAGNOSIS — N75.0 CYST OF BARTHOLIN'S GLAND: Chronic | ICD-10-CM

## 2025-05-09 DIAGNOSIS — Z98.890 OTHER SPECIFIED POSTPROCEDURAL STATES: Chronic | ICD-10-CM

## 2025-05-09 PROCEDURE — 78815 PET IMAGE W/CT SKULL-THIGH: CPT | Mod: 26,PS

## 2025-05-09 PROCEDURE — A9552: CPT

## 2025-05-09 PROCEDURE — 82962 GLUCOSE BLOOD TEST: CPT

## 2025-05-09 PROCEDURE — 78815 PET IMAGE W/CT SKULL-THIGH: CPT

## 2025-05-13 RX ORDER — LIDOCAINE HYDROCHLORIDE 20 MG/ML
1 JELLY TOPICAL ONCE
Refills: 0 | Status: COMPLETED | OUTPATIENT
Start: 2025-05-15 | End: 2025-05-15

## 2025-05-13 RX ORDER — PALONOSETRON HYDROCHLORIDE 0.05 MG/ML
0.25 INJECTION, SOLUTION INTRAVENOUS ONCE
Refills: 0 | Status: COMPLETED | OUTPATIENT
Start: 2025-05-15 | End: 2025-05-15

## 2025-05-15 ENCOUNTER — APPOINTMENT (OUTPATIENT)
Dept: INFUSION THERAPY | Facility: CLINIC | Age: 70
End: 2025-05-15

## 2025-05-15 ENCOUNTER — APPOINTMENT (OUTPATIENT)
Dept: HEMATOLOGY ONCOLOGY | Facility: CLINIC | Age: 70
End: 2025-05-15
Payer: MEDICARE

## 2025-05-15 ENCOUNTER — NON-APPOINTMENT (OUTPATIENT)
Age: 70
End: 2025-05-15

## 2025-05-15 ENCOUNTER — OUTPATIENT (OUTPATIENT)
Dept: OUTPATIENT SERVICES | Facility: HOSPITAL | Age: 70
LOS: 1 days | End: 2025-05-15
Payer: MEDICARE

## 2025-05-15 VITALS
SYSTOLIC BLOOD PRESSURE: 139 MMHG | HEART RATE: 96 BPM | DIASTOLIC BLOOD PRESSURE: 76 MMHG | TEMPERATURE: 98 F | RESPIRATION RATE: 18 BRPM | WEIGHT: 160.94 LBS | HEIGHT: 63 IN | OXYGEN SATURATION: 96 %

## 2025-05-15 VITALS
SYSTOLIC BLOOD PRESSURE: 170 MMHG | WEIGHT: 161.25 LBS | TEMPERATURE: 98 F | BODY MASS INDEX: 28.57 KG/M2 | OXYGEN SATURATION: 98 % | HEART RATE: 81 BPM | HEIGHT: 63 IN | RESPIRATION RATE: 18 BRPM | DIASTOLIC BLOOD PRESSURE: 84 MMHG

## 2025-05-15 DIAGNOSIS — E61.1 IRON DEFICIENCY: ICD-10-CM

## 2025-05-15 DIAGNOSIS — Z90.79 ACQUIRED ABSENCE OF OTHER GENITAL ORGAN(S): Chronic | ICD-10-CM

## 2025-05-15 DIAGNOSIS — Z98.890 OTHER SPECIFIED POSTPROCEDURAL STATES: Chronic | ICD-10-CM

## 2025-05-15 DIAGNOSIS — R11.2 NAUSEA WITH VOMITING, UNSPECIFIED: ICD-10-CM

## 2025-05-15 DIAGNOSIS — N75.0 CYST OF BARTHOLIN'S GLAND: Chronic | ICD-10-CM

## 2025-05-15 DIAGNOSIS — T45.1X5A NAUSEA WITH VOMITING, UNSPECIFIED: ICD-10-CM

## 2025-05-15 DIAGNOSIS — C67.9 MALIGNANT NEOPLASM OF BLADDER, UNSPECIFIED: ICD-10-CM

## 2025-05-15 LAB
ALBUMIN SERPL ELPH-MCNC: 3.4 G/DL
ALP BLD-CCNC: 83 U/L
ALT SERPL-CCNC: 16 U/L
ANION GAP SERPL CALC-SCNC: 1 MMOL/L
AST SERPL-CCNC: 22 U/L
BILIRUB SERPL-MCNC: 0.5 MG/DL
BUN SERPL-MCNC: 9 MG/DL
CALCIUM SERPL-MCNC: 9.5 MG/DL
CHLORIDE SERPL-SCNC: 110 MMOL/L
CO2 SERPL-SCNC: 26 MMOL/L
CREAT SERPL-MCNC: 0.6 MG/DL
EGFRCR SERPLBLD CKD-EPI 2021: 95 ML/MIN/1.73M2
GLUCOSE SERPL-MCNC: 120 MG/DL
HCT VFR BLD CALC: 32.3 %
HGB BLD-MCNC: 10.5 G/DL
LYMPHOCYTES # BLD AUTO: 2 K/UL
LYMPHOCYTES NFR BLD AUTO: 17.6 %
MAN DIFF?: NO
MCHC RBC-ENTMCNC: 27.1 PG
MCHC RBC-ENTMCNC: 32.5 G/DL
MCV RBC AUTO: 83.5 FL
NEUTROPHILS # BLD AUTO: 8.2 K/UL
NEUTROPHILS NFR BLD AUTO: 74 %
PLATELET # BLD AUTO: 431 K/UL
POTASSIUM SERPL-SCNC: 4.1 MMOL/L
PROT SERPL-MCNC: 7.1 G/DL
RBC # BLD: 3.87 M/UL
RBC # FLD: 20.3 %
SODIUM SERPL-SCNC: 137 MMOL/L
WBC # FLD AUTO: 11.1 K/UL

## 2025-05-15 PROCEDURE — 96413 CHEMO IV INFUSION 1 HR: CPT

## 2025-05-15 PROCEDURE — 96417 CHEMO IV INFUS EACH ADDL SEQ: CPT

## 2025-05-15 PROCEDURE — 99214 OFFICE O/P EST MOD 30 MIN: CPT | Mod: 25

## 2025-05-15 PROCEDURE — 96375 TX/PRO/DX INJ NEW DRUG ADDON: CPT

## 2025-05-15 PROCEDURE — 36415 COLL VENOUS BLD VENIPUNCTURE: CPT

## 2025-05-15 RX ORDER — ENFORTUMAB VEDOTIN 30 MG/3.3ML
90 INJECTION, POWDER, LYOPHILIZED, FOR SOLUTION INTRAVENOUS ONCE
Refills: 0 | Status: COMPLETED | OUTPATIENT
Start: 2025-05-15 | End: 2025-05-15

## 2025-05-15 RX ORDER — PEMBROLIZUMAB 50 MG/2ML
200 INJECTION, POWDER, LYOPHILIZED, FOR SOLUTION INTRAVENOUS ONCE
Refills: 0 | Status: COMPLETED | OUTPATIENT
Start: 2025-05-15 | End: 2025-05-15

## 2025-05-15 RX ORDER — DEXAMETHASONE 0.5 MG/1
12 TABLET ORAL ONCE
Refills: 0 | Status: COMPLETED | OUTPATIENT
Start: 2025-05-15 | End: 2025-05-15

## 2025-05-15 RX ADMIN — DEXAMETHASONE 12 MILLIGRAM(S): 0.5 TABLET ORAL at 10:35

## 2025-05-15 RX ADMIN — PALONOSETRON HYDROCHLORIDE 0.25 MILLIGRAM(S): 0.05 INJECTION, SOLUTION INTRAVENOUS at 10:35

## 2025-05-15 RX ADMIN — PEMBROLIZUMAB 200 MILLIGRAM(S): 50 INJECTION, POWDER, LYOPHILIZED, FOR SOLUTION INTRAVENOUS at 10:20

## 2025-05-15 RX ADMIN — ENFORTUMAB VEDOTIN 90 MILLIGRAM(S): 30 INJECTION, POWDER, LYOPHILIZED, FOR SOLUTION INTRAVENOUS at 11:35

## 2025-05-15 RX ADMIN — DEXAMETHASONE 212 MILLIGRAM(S): 0.5 TABLET ORAL at 10:20

## 2025-05-15 RX ADMIN — ENFORTUMAB VEDOTIN 90 MILLIGRAM(S): 30 INJECTION, POWDER, LYOPHILIZED, FOR SOLUTION INTRAVENOUS at 10:51

## 2025-05-15 RX ADMIN — PEMBROLIZUMAB 200 MILLIGRAM(S): 50 INJECTION, POWDER, LYOPHILIZED, FOR SOLUTION INTRAVENOUS at 09:49

## 2025-05-16 ENCOUNTER — NON-APPOINTMENT (OUTPATIENT)
Age: 70
End: 2025-05-16

## 2025-05-16 PROBLEM — R11.2 CHEMOTHERAPY-INDUCED NAUSEA AND VOMITING: Status: ACTIVE | Noted: 2025-05-16

## 2025-05-16 RX ORDER — ONDANSETRON 8 MG/1
8 TABLET, ORALLY DISINTEGRATING ORAL EVERY 8 HOURS
Qty: 45 | Refills: 5 | Status: ACTIVE | COMMUNITY
Start: 2025-05-16 | End: 1900-01-01

## 2025-05-22 ENCOUNTER — APPOINTMENT (OUTPATIENT)
Dept: INFUSION THERAPY | Facility: CLINIC | Age: 70
End: 2025-05-22

## 2025-05-22 ENCOUNTER — OUTPATIENT (OUTPATIENT)
Dept: OUTPATIENT SERVICES | Facility: HOSPITAL | Age: 70
LOS: 1 days | End: 2025-05-22
Payer: MEDICARE

## 2025-05-22 ENCOUNTER — APPOINTMENT (OUTPATIENT)
Dept: HEMATOLOGY ONCOLOGY | Facility: CLINIC | Age: 70
End: 2025-05-22

## 2025-05-22 VITALS
SYSTOLIC BLOOD PRESSURE: 110 MMHG | OXYGEN SATURATION: 98 % | RESPIRATION RATE: 18 BRPM | TEMPERATURE: 97 F | HEART RATE: 60 BPM | DIASTOLIC BLOOD PRESSURE: 72 MMHG

## 2025-05-22 DIAGNOSIS — N75.0 CYST OF BARTHOLIN'S GLAND: Chronic | ICD-10-CM

## 2025-05-22 DIAGNOSIS — C67.9 MALIGNANT NEOPLASM OF BLADDER, UNSPECIFIED: ICD-10-CM

## 2025-05-22 DIAGNOSIS — Z90.79 ACQUIRED ABSENCE OF OTHER GENITAL ORGAN(S): Chronic | ICD-10-CM

## 2025-05-22 DIAGNOSIS — Z98.890 OTHER SPECIFIED POSTPROCEDURAL STATES: Chronic | ICD-10-CM

## 2025-05-22 LAB
ALBUMIN SERPL ELPH-MCNC: 3.6 G/DL — SIGNIFICANT CHANGE UP (ref 3.3–5)
ALP SERPL-CCNC: 80 U/L — SIGNIFICANT CHANGE UP (ref 40–120)
ALT FLD-CCNC: 13 U/L — SIGNIFICANT CHANGE UP (ref 10–45)
ANION GAP SERPL CALC-SCNC: 4 MMOL/L — LOW (ref 5–17)
AST SERPL-CCNC: 28 U/L — SIGNIFICANT CHANGE UP (ref 10–40)
BILIRUB SERPL-MCNC: 0.6 MG/DL — SIGNIFICANT CHANGE UP (ref 0.2–1.2)
BUN SERPL-MCNC: 10 MG/DL — SIGNIFICANT CHANGE UP (ref 7–23)
CALCIUM SERPL-MCNC: 9.7 MG/DL — SIGNIFICANT CHANGE UP (ref 8.4–10.5)
CHLORIDE SERPL-SCNC: 109 MMOL/L — HIGH (ref 96–108)
CO2 SERPL-SCNC: 25 MMOL/L — SIGNIFICANT CHANGE UP (ref 22–31)
CREAT SERPL-MCNC: 0.6 MG/DL — SIGNIFICANT CHANGE UP (ref 0.5–1.3)
EGFR: 95 ML/MIN/1.73M2 — SIGNIFICANT CHANGE UP
EGFR: 95 ML/MIN/1.73M2 — SIGNIFICANT CHANGE UP
GLUCOSE SERPL-MCNC: 153 MG/DL — HIGH (ref 70–99)
HCT VFR BLD CALC: 33.4 % — LOW (ref 34.5–45)
HGB BLD-MCNC: 10.7 G/DL — LOW (ref 11.5–15.5)
LYMPHOCYTES # BLD AUTO: 19.6 % — SIGNIFICANT CHANGE UP (ref 13–44)
LYMPHOCYTES # BLD AUTO: 2.1 K/UL — SIGNIFICANT CHANGE UP (ref 1–3.3)
MAGNESIUM SERPL-MCNC: 2.2 MG/DL — SIGNIFICANT CHANGE UP (ref 1.6–2.6)
MCHC RBC-ENTMCNC: 27 PG — SIGNIFICANT CHANGE UP (ref 27–34)
MCHC RBC-ENTMCNC: 32 G/DL — SIGNIFICANT CHANGE UP (ref 32–36)
MCV RBC AUTO: 84.3 FL — SIGNIFICANT CHANGE UP (ref 80–100)
NEUTROPHILS # BLD AUTO: 7.7 K/UL — HIGH (ref 1.8–7.4)
NEUTROPHILS NFR BLD AUTO: 73.5 % — SIGNIFICANT CHANGE UP (ref 43–77)
PLATELET # BLD AUTO: 402 K/UL — HIGH (ref 150–400)
POTASSIUM SERPL-MCNC: 4 MMOL/L — SIGNIFICANT CHANGE UP (ref 3.5–5.3)
POTASSIUM SERPL-SCNC: 4 MMOL/L — SIGNIFICANT CHANGE UP (ref 3.5–5.3)
PROT SERPL-MCNC: 7.4 G/DL — SIGNIFICANT CHANGE UP (ref 6–8.3)
RBC # BLD: 3.96 M/UL — SIGNIFICANT CHANGE UP (ref 3.8–5.2)
RBC # FLD: 20.2 % — HIGH (ref 10.3–14.5)
SODIUM SERPL-SCNC: 138 MMOL/L — SIGNIFICANT CHANGE UP (ref 135–145)
WBC # BLD: 10.5 K/UL — SIGNIFICANT CHANGE UP (ref 3.8–10.5)
WBC # FLD AUTO: 10.5 K/UL — SIGNIFICANT CHANGE UP (ref 3.8–10.5)

## 2025-05-22 PROCEDURE — 85025 COMPLETE CBC W/AUTO DIFF WBC: CPT

## 2025-05-22 PROCEDURE — 96375 TX/PRO/DX INJ NEW DRUG ADDON: CPT

## 2025-05-22 PROCEDURE — 36415 COLL VENOUS BLD VENIPUNCTURE: CPT

## 2025-05-22 PROCEDURE — 80053 COMPREHEN METABOLIC PANEL: CPT

## 2025-05-22 PROCEDURE — 96413 CHEMO IV INFUSION 1 HR: CPT

## 2025-05-22 PROCEDURE — 83735 ASSAY OF MAGNESIUM: CPT

## 2025-05-22 RX ORDER — ENFORTUMAB VEDOTIN 30 MG/3.3ML
90 INJECTION, POWDER, LYOPHILIZED, FOR SOLUTION INTRAVENOUS ONCE
Refills: 0 | Status: COMPLETED | OUTPATIENT
Start: 2025-05-22 | End: 2025-05-22

## 2025-05-22 RX ORDER — DEXAMETHASONE 0.5 MG/1
12 TABLET ORAL ONCE
Refills: 0 | Status: COMPLETED | OUTPATIENT
Start: 2025-05-22 | End: 2025-05-22

## 2025-05-22 RX ORDER — LIDOCAINE HYDROCHLORIDE 20 MG/ML
1 JELLY TOPICAL ONCE
Refills: 0 | Status: COMPLETED | OUTPATIENT
Start: 2025-05-22 | End: 2025-05-22

## 2025-05-22 RX ORDER — PALONOSETRON HYDROCHLORIDE 0.05 MG/ML
0.25 INJECTION, SOLUTION INTRAVENOUS ONCE
Refills: 0 | Status: COMPLETED | OUTPATIENT
Start: 2025-05-22 | End: 2025-05-22

## 2025-05-22 RX ADMIN — DEXAMETHASONE 212 MILLIGRAM(S): 0.5 TABLET ORAL at 14:51

## 2025-05-22 RX ADMIN — Medication 10 MILLILITER(S): at 15:58

## 2025-05-22 RX ADMIN — ENFORTUMAB VEDOTIN 90 MILLIGRAM(S): 30 INJECTION, POWDER, LYOPHILIZED, FOR SOLUTION INTRAVENOUS at 15:17

## 2025-05-22 RX ADMIN — DEXAMETHASONE 12 MILLIGRAM(S): 0.5 TABLET ORAL at 15:05

## 2025-05-22 RX ADMIN — ENFORTUMAB VEDOTIN 90 MILLIGRAM(S): 30 INJECTION, POWDER, LYOPHILIZED, FOR SOLUTION INTRAVENOUS at 15:50

## 2025-05-22 RX ADMIN — PALONOSETRON HYDROCHLORIDE 0.25 MILLIGRAM(S): 0.05 INJECTION, SOLUTION INTRAVENOUS at 14:45

## 2025-06-03 ENCOUNTER — APPOINTMENT (OUTPATIENT)
Dept: ORTHOPEDIC SURGERY | Facility: CLINIC | Age: 70
End: 2025-06-03
Payer: MEDICARE

## 2025-06-03 DIAGNOSIS — S99.922A UNSPECIFIED INJURY OF LEFT FOOT, INITIAL ENCOUNTER: ICD-10-CM

## 2025-06-03 PROCEDURE — 73610 X-RAY EXAM OF ANKLE: CPT | Mod: LT

## 2025-06-03 PROCEDURE — 99203 OFFICE O/P NEW LOW 30 MIN: CPT | Mod: 25

## 2025-06-03 PROCEDURE — 73630 X-RAY EXAM OF FOOT: CPT | Mod: LT

## 2025-06-05 ENCOUNTER — APPOINTMENT (OUTPATIENT)
Dept: INFUSION THERAPY | Facility: CLINIC | Age: 70
End: 2025-06-05

## 2025-06-05 ENCOUNTER — APPOINTMENT (OUTPATIENT)
Dept: HEMATOLOGY ONCOLOGY | Facility: CLINIC | Age: 70
End: 2025-06-05
Payer: MEDICARE

## 2025-06-05 ENCOUNTER — OUTPATIENT (OUTPATIENT)
Dept: OUTPATIENT SERVICES | Facility: HOSPITAL | Age: 70
LOS: 1 days | End: 2025-06-05
Payer: MEDICARE

## 2025-06-05 VITALS
RESPIRATION RATE: 18 BRPM | HEART RATE: 70 BPM | DIASTOLIC BLOOD PRESSURE: 77 MMHG | SYSTOLIC BLOOD PRESSURE: 126 MMHG | OXYGEN SATURATION: 98 % | TEMPERATURE: 98 F

## 2025-06-05 VITALS
OXYGEN SATURATION: 94 % | WEIGHT: 164 LBS | BODY MASS INDEX: 29.06 KG/M2 | HEIGHT: 63 IN | HEART RATE: 91 BPM | RESPIRATION RATE: 18 BRPM | TEMPERATURE: 97.9 F | DIASTOLIC BLOOD PRESSURE: 76 MMHG | SYSTOLIC BLOOD PRESSURE: 158 MMHG

## 2025-06-05 VITALS
OXYGEN SATURATION: 97 % | WEIGHT: 164.02 LBS | SYSTOLIC BLOOD PRESSURE: 138 MMHG | TEMPERATURE: 98 F | HEIGHT: 62 IN | HEART RATE: 74 BPM | RESPIRATION RATE: 18 BRPM | DIASTOLIC BLOOD PRESSURE: 76 MMHG

## 2025-06-05 DIAGNOSIS — Z90.79 ACQUIRED ABSENCE OF OTHER GENITAL ORGAN(S): Chronic | ICD-10-CM

## 2025-06-05 DIAGNOSIS — N75.0 CYST OF BARTHOLIN'S GLAND: Chronic | ICD-10-CM

## 2025-06-05 DIAGNOSIS — C67.9 MALIGNANT NEOPLASM OF BLADDER, UNSPECIFIED: ICD-10-CM

## 2025-06-05 DIAGNOSIS — Z98.890 OTHER SPECIFIED POSTPROCEDURAL STATES: Chronic | ICD-10-CM

## 2025-06-05 DIAGNOSIS — M79.10 MYALGIA, UNSPECIFIED SITE: ICD-10-CM

## 2025-06-05 LAB
ALBUMIN SERPL ELPH-MCNC: 3.3 G/DL
ALP BLD-CCNC: 72 U/L
ALT SERPL-CCNC: 11 U/L
ANION GAP SERPL CALC-SCNC: 8 MMOL/L
AST SERPL-CCNC: 22 U/L
BILIRUB SERPL-MCNC: 0.5 MG/DL
BUN SERPL-MCNC: 11 MG/DL
CALCIUM SERPL-MCNC: 9.4 MG/DL
CHLORIDE SERPL-SCNC: 109 MMOL/L
CO2 SERPL-SCNC: 24 MMOL/L
CREAT SERPL-MCNC: 0.5 MG/DL
EGFRCR SERPLBLD CKD-EPI 2021: 100 ML/MIN/1.73M2
FERRITIN SERPL-MCNC: 129 NG/ML
GLUCOSE SERPL-MCNC: 97 MG/DL
HCT VFR BLD CALC: 31.2 %
HGB BLD-MCNC: 9.8 G/DL
IRON SATN MFR SERPL: 13 %
IRON SERPL-MCNC: 36 UG/DL
LYMPHOCYTES # BLD AUTO: 1.8 K/UL
LYMPHOCYTES NFR BLD AUTO: 17.9 %
MAN DIFF?: NO
MCHC RBC-ENTMCNC: 26.6 PG
MCHC RBC-ENTMCNC: 31.4 G/DL
MCV RBC AUTO: 84.6 FL
NEUTROPHILS # BLD AUTO: 7.3 K/UL
NEUTROPHILS NFR BLD AUTO: 71.9 %
PLATELET # BLD AUTO: 399 K/UL
POTASSIUM SERPL-SCNC: 3.6 MMOL/L
PROT SERPL-MCNC: 7 G/DL
RBC # BLD: 3.69 M/UL
RBC # FLD: 19.5 %
SODIUM SERPL-SCNC: 141 MMOL/L
TIBC SERPL-MCNC: 278 UG/DL
UIBC SERPL-MCNC: 242 UG/DL
WBC # FLD AUTO: 10.1 K/UL

## 2025-06-05 PROCEDURE — 36415 COLL VENOUS BLD VENIPUNCTURE: CPT

## 2025-06-05 PROCEDURE — 96417 CHEMO IV INFUS EACH ADDL SEQ: CPT

## 2025-06-05 PROCEDURE — 96375 TX/PRO/DX INJ NEW DRUG ADDON: CPT

## 2025-06-05 PROCEDURE — 99214 OFFICE O/P EST MOD 30 MIN: CPT | Mod: 25

## 2025-06-05 PROCEDURE — 96413 CHEMO IV INFUSION 1 HR: CPT

## 2025-06-05 RX ORDER — ENFORTUMAB VEDOTIN 30 MG/3.3ML
90 INJECTION, POWDER, LYOPHILIZED, FOR SOLUTION INTRAVENOUS ONCE
Refills: 0 | Status: COMPLETED | OUTPATIENT
Start: 2025-06-05 | End: 2025-06-05

## 2025-06-05 RX ORDER — PALONOSETRON HYDROCHLORIDE 0.05 MG/ML
0.25 INJECTION, SOLUTION INTRAVENOUS ONCE
Refills: 0 | Status: COMPLETED | OUTPATIENT
Start: 2025-06-05 | End: 2025-06-05

## 2025-06-05 RX ORDER — LIDOCAINE HYDROCHLORIDE 20 MG/ML
1 JELLY TOPICAL ONCE
Refills: 0 | Status: COMPLETED | OUTPATIENT
Start: 2025-06-05 | End: 2025-06-05

## 2025-06-05 RX ORDER — DEXAMETHASONE 0.5 MG/1
12 TABLET ORAL ONCE
Refills: 0 | Status: COMPLETED | OUTPATIENT
Start: 2025-06-05 | End: 2025-06-05

## 2025-06-05 RX ORDER — PEMBROLIZUMAB 50 MG/2ML
200 INJECTION, POWDER, LYOPHILIZED, FOR SOLUTION INTRAVENOUS ONCE
Refills: 0 | Status: COMPLETED | OUTPATIENT
Start: 2025-06-05 | End: 2025-06-05

## 2025-06-05 RX ADMIN — PALONOSETRON HYDROCHLORIDE 0.25 MILLIGRAM(S): 0.05 INJECTION, SOLUTION INTRAVENOUS at 16:45

## 2025-06-05 RX ADMIN — PEMBROLIZUMAB 200 MILLIGRAM(S): 50 INJECTION, POWDER, LYOPHILIZED, FOR SOLUTION INTRAVENOUS at 15:56

## 2025-06-05 RX ADMIN — ENFORTUMAB VEDOTIN 90 MILLIGRAM(S): 30 INJECTION, POWDER, LYOPHILIZED, FOR SOLUTION INTRAVENOUS at 18:16

## 2025-06-05 RX ADMIN — DEXAMETHASONE 12 MILLIGRAM(S): 0.5 TABLET ORAL at 17:10

## 2025-06-05 RX ADMIN — DEXAMETHASONE 212 MILLIGRAM(S): 0.5 TABLET ORAL at 16:51

## 2025-06-05 RX ADMIN — PEMBROLIZUMAB 200 MILLIGRAM(S): 50 INJECTION, POWDER, LYOPHILIZED, FOR SOLUTION INTRAVENOUS at 16:28

## 2025-06-05 RX ADMIN — Medication 10 MILLILITER(S): at 18:48

## 2025-06-05 RX ADMIN — ENFORTUMAB VEDOTIN 90 MILLIGRAM(S): 30 INJECTION, POWDER, LYOPHILIZED, FOR SOLUTION INTRAVENOUS at 18:47

## 2025-06-10 LAB — TSH SERPL-ACNC: 2.45 UIU/ML

## 2025-06-12 ENCOUNTER — APPOINTMENT (OUTPATIENT)
Dept: HEMATOLOGY ONCOLOGY | Facility: CLINIC | Age: 70
End: 2025-06-12

## 2025-06-12 ENCOUNTER — APPOINTMENT (OUTPATIENT)
Dept: INFUSION THERAPY | Facility: CLINIC | Age: 70
End: 2025-06-12

## 2025-06-12 ENCOUNTER — OUTPATIENT (OUTPATIENT)
Dept: OUTPATIENT SERVICES | Facility: HOSPITAL | Age: 70
LOS: 1 days | End: 2025-06-12
Payer: MEDICARE

## 2025-06-12 VITALS
HEART RATE: 86 BPM | TEMPERATURE: 98 F | DIASTOLIC BLOOD PRESSURE: 60 MMHG | OXYGEN SATURATION: 97 % | RESPIRATION RATE: 17 BRPM | SYSTOLIC BLOOD PRESSURE: 115 MMHG

## 2025-06-12 VITALS
TEMPERATURE: 98 F | RESPIRATION RATE: 19 BRPM | HEIGHT: 63 IN | HEART RATE: 80 BPM | SYSTOLIC BLOOD PRESSURE: 127 MMHG | DIASTOLIC BLOOD PRESSURE: 76 MMHG | OXYGEN SATURATION: 97 % | WEIGHT: 160.06 LBS

## 2025-06-12 DIAGNOSIS — N75.0 CYST OF BARTHOLIN'S GLAND: Chronic | ICD-10-CM

## 2025-06-12 DIAGNOSIS — Z98.890 OTHER SPECIFIED POSTPROCEDURAL STATES: Chronic | ICD-10-CM

## 2025-06-12 DIAGNOSIS — C67.9 MALIGNANT NEOPLASM OF BLADDER, UNSPECIFIED: ICD-10-CM

## 2025-06-12 DIAGNOSIS — Z90.79 ACQUIRED ABSENCE OF OTHER GENITAL ORGAN(S): Chronic | ICD-10-CM

## 2025-06-12 LAB
ALBUMIN SERPL ELPH-MCNC: 3.2 G/DL — LOW (ref 3.3–5)
ALP SERPL-CCNC: 73 U/L — SIGNIFICANT CHANGE UP (ref 40–120)
ALT FLD-CCNC: 16 U/L — SIGNIFICANT CHANGE UP (ref 10–45)
ANION GAP SERPL CALC-SCNC: 10 MMOL/L — SIGNIFICANT CHANGE UP (ref 5–17)
AST SERPL-CCNC: 23 U/L — SIGNIFICANT CHANGE UP (ref 10–40)
BILIRUB SERPL-MCNC: 0.6 MG/DL — SIGNIFICANT CHANGE UP (ref 0.2–1.2)
BUN SERPL-MCNC: 9 MG/DL — SIGNIFICANT CHANGE UP (ref 7–23)
CALCIUM SERPL-MCNC: 9.3 MG/DL — SIGNIFICANT CHANGE UP (ref 8.4–10.5)
CHLORIDE SERPL-SCNC: 108 MMOL/L — SIGNIFICANT CHANGE UP (ref 96–108)
CO2 SERPL-SCNC: 25 MMOL/L — SIGNIFICANT CHANGE UP (ref 22–31)
CREAT SERPL-MCNC: 0.6 MG/DL — SIGNIFICANT CHANGE UP (ref 0.5–1.3)
EGFR: 95 ML/MIN/1.73M2 — SIGNIFICANT CHANGE UP
EGFR: 95 ML/MIN/1.73M2 — SIGNIFICANT CHANGE UP
GLUCOSE SERPL-MCNC: 117 MG/DL — HIGH (ref 70–99)
HCT VFR BLD CALC: 34 % — LOW (ref 34.5–45)
HGB BLD-MCNC: 10.9 G/DL — LOW (ref 11.5–15.5)
LYMPHOCYTES # BLD AUTO: 1.9 K/UL — SIGNIFICANT CHANGE UP (ref 1–3.3)
LYMPHOCYTES # BLD AUTO: 17.7 % — SIGNIFICANT CHANGE UP (ref 13–44)
MAGNESIUM SERPL-MCNC: 2.2 MG/DL — SIGNIFICANT CHANGE UP (ref 1.6–2.6)
MCHC RBC-ENTMCNC: 27.1 PG — SIGNIFICANT CHANGE UP (ref 27–34)
MCHC RBC-ENTMCNC: 32.1 G/DL — SIGNIFICANT CHANGE UP (ref 32–36)
MCV RBC AUTO: 84.6 FL — SIGNIFICANT CHANGE UP (ref 80–100)
NEUTROPHILS # BLD AUTO: 7.2 K/UL — SIGNIFICANT CHANGE UP (ref 1.8–7.4)
NEUTROPHILS NFR BLD AUTO: 69.1 % — SIGNIFICANT CHANGE UP (ref 43–77)
PLATELET # BLD AUTO: 407 K/UL — HIGH (ref 150–400)
POTASSIUM SERPL-MCNC: 3.7 MMOL/L — SIGNIFICANT CHANGE UP (ref 3.5–5.3)
POTASSIUM SERPL-SCNC: 3.7 MMOL/L — SIGNIFICANT CHANGE UP (ref 3.5–5.3)
PROT SERPL-MCNC: 7.4 G/DL — SIGNIFICANT CHANGE UP (ref 6–8.3)
RBC # BLD: 4.02 M/UL — SIGNIFICANT CHANGE UP (ref 3.8–5.2)
RBC # FLD: 19.3 % — HIGH (ref 10.3–14.5)
SODIUM SERPL-SCNC: 143 MMOL/L — SIGNIFICANT CHANGE UP (ref 135–145)
WBC # BLD: 10.5 K/UL — SIGNIFICANT CHANGE UP (ref 3.8–10.5)
WBC # FLD AUTO: 10.5 K/UL — SIGNIFICANT CHANGE UP (ref 3.8–10.5)

## 2025-06-12 PROCEDURE — 96375 TX/PRO/DX INJ NEW DRUG ADDON: CPT

## 2025-06-12 PROCEDURE — 85025 COMPLETE CBC W/AUTO DIFF WBC: CPT

## 2025-06-12 PROCEDURE — 36415 COLL VENOUS BLD VENIPUNCTURE: CPT

## 2025-06-12 PROCEDURE — 80053 COMPREHEN METABOLIC PANEL: CPT

## 2025-06-12 PROCEDURE — 83735 ASSAY OF MAGNESIUM: CPT

## 2025-06-12 PROCEDURE — 96413 CHEMO IV INFUSION 1 HR: CPT

## 2025-06-12 RX ORDER — DEXAMETHASONE 0.5 MG/1
12 TABLET ORAL ONCE
Refills: 0 | Status: COMPLETED | OUTPATIENT
Start: 2025-06-12 | End: 2025-06-12

## 2025-06-12 RX ORDER — LIDOCAINE HYDROCHLORIDE 20 MG/ML
1 JELLY TOPICAL ONCE
Refills: 0 | Status: COMPLETED | OUTPATIENT
Start: 2025-06-12 | End: 2025-06-12

## 2025-06-12 RX ORDER — PALONOSETRON HYDROCHLORIDE 0.05 MG/ML
0.25 INJECTION, SOLUTION INTRAVENOUS ONCE
Refills: 0 | Status: COMPLETED | OUTPATIENT
Start: 2025-06-12 | End: 2025-06-12

## 2025-06-12 RX ORDER — ENFORTUMAB VEDOTIN 30 MG/3.3ML
90 INJECTION, POWDER, LYOPHILIZED, FOR SOLUTION INTRAVENOUS ONCE
Refills: 0 | Status: COMPLETED | OUTPATIENT
Start: 2025-06-12 | End: 2025-06-12

## 2025-06-12 RX ADMIN — ENFORTUMAB VEDOTIN 90 MILLIGRAM(S): 30 INJECTION, POWDER, LYOPHILIZED, FOR SOLUTION INTRAVENOUS at 15:30

## 2025-06-12 RX ADMIN — ENFORTUMAB VEDOTIN 90 MILLIGRAM(S): 30 INJECTION, POWDER, LYOPHILIZED, FOR SOLUTION INTRAVENOUS at 14:54

## 2025-06-12 RX ADMIN — Medication 10 MILLILITER(S): at 14:06

## 2025-06-12 RX ADMIN — DEXAMETHASONE 12 MILLIGRAM(S): 0.5 TABLET ORAL at 14:28

## 2025-06-12 RX ADMIN — DEXAMETHASONE 212 MILLIGRAM(S): 0.5 TABLET ORAL at 14:13

## 2025-06-12 RX ADMIN — PALONOSETRON HYDROCHLORIDE 0.25 MILLIGRAM(S): 0.05 INJECTION, SOLUTION INTRAVENOUS at 14:11

## 2025-06-24 RX ORDER — ENFORTUMAB VEDOTIN 30 MG/3.3ML
90 INJECTION, POWDER, LYOPHILIZED, FOR SOLUTION INTRAVENOUS ONCE
Refills: 0 | Status: COMPLETED | OUTPATIENT
Start: 2025-06-26 | End: 2025-06-26

## 2025-06-24 RX ORDER — DEXAMETHASONE 0.5 MG/1
12 TABLET ORAL ONCE
Refills: 0 | Status: COMPLETED | OUTPATIENT
Start: 2025-06-26 | End: 2025-06-26

## 2025-06-24 RX ORDER — LIDOCAINE HYDROCHLORIDE 20 MG/ML
1 JELLY TOPICAL ONCE
Refills: 0 | Status: COMPLETED | OUTPATIENT
Start: 2025-06-26 | End: 2025-06-26

## 2025-06-24 RX ORDER — PALONOSETRON HYDROCHLORIDE 0.05 MG/ML
0.25 INJECTION, SOLUTION INTRAVENOUS ONCE
Refills: 0 | Status: COMPLETED | OUTPATIENT
Start: 2025-06-26 | End: 2025-06-26

## 2025-06-26 ENCOUNTER — APPOINTMENT (OUTPATIENT)
Dept: HEMATOLOGY ONCOLOGY | Facility: CLINIC | Age: 70
End: 2025-06-26

## 2025-06-26 ENCOUNTER — APPOINTMENT (OUTPATIENT)
Dept: INFUSION THERAPY | Facility: CLINIC | Age: 70
End: 2025-06-26

## 2025-06-26 ENCOUNTER — APPOINTMENT (OUTPATIENT)
Dept: ORTHOPEDIC SURGERY | Facility: CLINIC | Age: 70
End: 2025-06-26

## 2025-06-26 ENCOUNTER — OUTPATIENT (OUTPATIENT)
Dept: OUTPATIENT SERVICES | Facility: HOSPITAL | Age: 70
LOS: 1 days | End: 2025-06-26
Payer: MEDICARE

## 2025-06-26 VITALS
TEMPERATURE: 99 F | OXYGEN SATURATION: 99 % | HEIGHT: 63 IN | HEART RATE: 83 BPM | DIASTOLIC BLOOD PRESSURE: 59 MMHG | RESPIRATION RATE: 18 BRPM | SYSTOLIC BLOOD PRESSURE: 108 MMHG | WEIGHT: 160.94 LBS

## 2025-06-26 VITALS
TEMPERATURE: 97.9 F | HEIGHT: 63 IN | WEIGHT: 161.25 LBS | SYSTOLIC BLOOD PRESSURE: 129 MMHG | RESPIRATION RATE: 18 BRPM | HEART RATE: 106 BPM | BODY MASS INDEX: 28.57 KG/M2 | DIASTOLIC BLOOD PRESSURE: 80 MMHG | OXYGEN SATURATION: 97 %

## 2025-06-26 DIAGNOSIS — C67.9 MALIGNANT NEOPLASM OF BLADDER, UNSPECIFIED: ICD-10-CM

## 2025-06-26 DIAGNOSIS — Z98.890 OTHER SPECIFIED POSTPROCEDURAL STATES: Chronic | ICD-10-CM

## 2025-06-26 DIAGNOSIS — Z90.79 ACQUIRED ABSENCE OF OTHER GENITAL ORGAN(S): Chronic | ICD-10-CM

## 2025-06-26 DIAGNOSIS — N75.0 CYST OF BARTHOLIN'S GLAND: Chronic | ICD-10-CM

## 2025-06-26 LAB
ALBUMIN SERPL ELPH-MCNC: 3.5 G/DL
ALP BLD-CCNC: 76 U/L
ALT SERPL-CCNC: 12 U/L
ANION GAP SERPL CALC-SCNC: 9 MMOL/L
AST SERPL-CCNC: 22 U/L
BILIRUB SERPL-MCNC: 0.5 MG/DL
BUN SERPL-MCNC: 9 MG/DL
CALCIUM SERPL-MCNC: 9.3 MG/DL
CHLORIDE SERPL-SCNC: 108 MMOL/L
CO2 SERPL-SCNC: 24 MMOL/L
CREAT SERPL-MCNC: 0.7 MG/DL
EGFRCR SERPLBLD CKD-EPI 2021: 92 ML/MIN/1.73M2
GLUCOSE SERPL-MCNC: 126 MG/DL
HCT VFR BLD CALC: 33.5 %
HGB BLD-MCNC: 10.7 G/DL
LYMPHOCYTES # BLD AUTO: 1.9 K/UL
LYMPHOCYTES NFR BLD AUTO: 16.1 %
MAN DIFF?: NO
MCHC RBC-ENTMCNC: 26.6 PG
MCHC RBC-ENTMCNC: 31.9 G/DL
MCV RBC AUTO: 83.1 FL
NEUTROPHILS # BLD AUTO: 9.3 K/UL
NEUTROPHILS NFR BLD AUTO: 76.9 %
PLATELET # BLD AUTO: 428 K/UL
POTASSIUM SERPL-SCNC: 4.1 MMOL/L
PROT SERPL-MCNC: 7.3 G/DL
RBC # BLD: 4.03 M/UL
RBC # FLD: 18.9 %
SODIUM SERPL-SCNC: 141 MMOL/L
WBC # FLD AUTO: 12 K/UL

## 2025-06-26 PROCEDURE — 96413 CHEMO IV INFUSION 1 HR: CPT

## 2025-06-26 PROCEDURE — 36415 COLL VENOUS BLD VENIPUNCTURE: CPT

## 2025-06-26 PROCEDURE — 99214 OFFICE O/P EST MOD 30 MIN: CPT | Mod: 25

## 2025-06-26 PROCEDURE — 96417 CHEMO IV INFUS EACH ADDL SEQ: CPT

## 2025-06-26 PROCEDURE — 96375 TX/PRO/DX INJ NEW DRUG ADDON: CPT

## 2025-06-26 RX ORDER — LINACLOTIDE 145 UG/1
145 CAPSULE, GELATIN COATED ORAL
Qty: 30 | Refills: 0 | Status: ACTIVE | COMMUNITY
Start: 2025-06-26

## 2025-06-26 RX ORDER — PEMBROLIZUMAB 50 MG/2ML
200 INJECTION, POWDER, LYOPHILIZED, FOR SOLUTION INTRAVENOUS ONCE
Refills: 0 | Status: COMPLETED | OUTPATIENT
Start: 2025-06-26 | End: 2025-06-26

## 2025-06-26 RX ORDER — ACETAMINOPHEN 325 MG/1
325 TABLET ORAL EVERY 6 HOURS
Qty: 60 | Refills: 3 | Status: ACTIVE | COMMUNITY
Start: 2025-06-26 | End: 1900-01-01

## 2025-06-26 RX ADMIN — DEXAMETHASONE 212 MILLIGRAM(S): 0.5 TABLET ORAL at 11:00

## 2025-06-26 RX ADMIN — ENFORTUMAB VEDOTIN 90 MILLIGRAM(S): 30 INJECTION, POWDER, LYOPHILIZED, FOR SOLUTION INTRAVENOUS at 12:30

## 2025-06-26 RX ADMIN — Medication 10 MILLILITER(S): at 12:37

## 2025-06-26 RX ADMIN — ENFORTUMAB VEDOTIN 90 MILLIGRAM(S): 30 INJECTION, POWDER, LYOPHILIZED, FOR SOLUTION INTRAVENOUS at 11:31

## 2025-06-26 RX ADMIN — PEMBROLIZUMAB 200 MILLIGRAM(S): 50 INJECTION, POWDER, LYOPHILIZED, FOR SOLUTION INTRAVENOUS at 11:05

## 2025-06-26 RX ADMIN — PALONOSETRON HYDROCHLORIDE 0.25 MILLIGRAM(S): 0.05 INJECTION, SOLUTION INTRAVENOUS at 11:20

## 2025-06-26 RX ADMIN — PEMBROLIZUMAB 200 MILLIGRAM(S): 50 INJECTION, POWDER, LYOPHILIZED, FOR SOLUTION INTRAVENOUS at 10:35

## 2025-06-26 RX ADMIN — DEXAMETHASONE 12 MILLIGRAM(S): 0.5 TABLET ORAL at 11:30

## 2025-06-26 RX ADMIN — LIDOCAINE HYDROCHLORIDE 1 APPLICATION(S): 20 JELLY TOPICAL at 10:00

## 2025-07-03 ENCOUNTER — OUTPATIENT (OUTPATIENT)
Dept: OUTPATIENT SERVICES | Facility: HOSPITAL | Age: 70
LOS: 1 days | End: 2025-07-03
Payer: MEDICARE

## 2025-07-03 ENCOUNTER — APPOINTMENT (OUTPATIENT)
Dept: HEMATOLOGY ONCOLOGY | Facility: CLINIC | Age: 70
End: 2025-07-03

## 2025-07-03 ENCOUNTER — APPOINTMENT (OUTPATIENT)
Dept: INFUSION THERAPY | Facility: CLINIC | Age: 70
End: 2025-07-03

## 2025-07-03 VITALS
SYSTOLIC BLOOD PRESSURE: 124 MMHG | DIASTOLIC BLOOD PRESSURE: 77 MMHG | WEIGHT: 160.94 LBS | HEART RATE: 82 BPM | OXYGEN SATURATION: 98 % | TEMPERATURE: 98 F | HEIGHT: 63 IN | RESPIRATION RATE: 16 BRPM

## 2025-07-03 DIAGNOSIS — N75.0 CYST OF BARTHOLIN'S GLAND: Chronic | ICD-10-CM

## 2025-07-03 DIAGNOSIS — Z98.890 OTHER SPECIFIED POSTPROCEDURAL STATES: Chronic | ICD-10-CM

## 2025-07-03 DIAGNOSIS — Z90.79 ACQUIRED ABSENCE OF OTHER GENITAL ORGAN(S): Chronic | ICD-10-CM

## 2025-07-03 DIAGNOSIS — C67.9 MALIGNANT NEOPLASM OF BLADDER, UNSPECIFIED: ICD-10-CM

## 2025-07-03 LAB
ALBUMIN SERPL ELPH-MCNC: 3.3 G/DL — SIGNIFICANT CHANGE UP (ref 3.3–5)
ALP SERPL-CCNC: 81 U/L — SIGNIFICANT CHANGE UP (ref 40–120)
ALT FLD-CCNC: 19 U/L — SIGNIFICANT CHANGE UP (ref 10–45)
ANION GAP SERPL CALC-SCNC: 4 MMOL/L — LOW (ref 5–17)
AST SERPL-CCNC: 31 U/L — SIGNIFICANT CHANGE UP (ref 10–40)
BILIRUB SERPL-MCNC: 0.6 MG/DL — SIGNIFICANT CHANGE UP (ref 0.2–1.2)
BUN SERPL-MCNC: 12 MG/DL — SIGNIFICANT CHANGE UP (ref 7–23)
CALCIUM SERPL-MCNC: 9.8 MG/DL — SIGNIFICANT CHANGE UP (ref 8.4–10.5)
CHLORIDE SERPL-SCNC: 107 MMOL/L — SIGNIFICANT CHANGE UP (ref 96–108)
CO2 SERPL-SCNC: 27 MMOL/L — SIGNIFICANT CHANGE UP (ref 22–31)
CREAT SERPL-MCNC: 0.6 MG/DL — SIGNIFICANT CHANGE UP (ref 0.5–1.3)
EGFR: 95 ML/MIN/1.73M2 — SIGNIFICANT CHANGE UP
EGFR: 95 ML/MIN/1.73M2 — SIGNIFICANT CHANGE UP
GLUCOSE SERPL-MCNC: 147 MG/DL — HIGH (ref 70–99)
HCT VFR BLD CALC: 32.5 % — LOW (ref 34.5–45)
HGB BLD-MCNC: 10.4 G/DL — LOW (ref 11.5–15.5)
LYMPHOCYTES # BLD AUTO: 1.8 K/UL — SIGNIFICANT CHANGE UP (ref 1–3.3)
LYMPHOCYTES # BLD AUTO: 16.4 % — SIGNIFICANT CHANGE UP (ref 13–44)
MAGNESIUM SERPL-MCNC: 2.1 MG/DL — SIGNIFICANT CHANGE UP (ref 1.6–2.6)
MCHC RBC-ENTMCNC: 26.6 PG — LOW (ref 27–34)
MCHC RBC-ENTMCNC: 32 G/DL — SIGNIFICANT CHANGE UP (ref 32–36)
MCV RBC AUTO: 83.1 FL — SIGNIFICANT CHANGE UP (ref 80–100)
NEUTROPHILS # BLD AUTO: 8.5 K/UL — HIGH (ref 1.8–7.4)
NEUTROPHILS NFR BLD AUTO: 75.9 % — SIGNIFICANT CHANGE UP (ref 43–77)
PLATELET # BLD AUTO: 420 K/UL — HIGH (ref 150–400)
POTASSIUM SERPL-MCNC: 3.7 MMOL/L — SIGNIFICANT CHANGE UP (ref 3.5–5.3)
POTASSIUM SERPL-SCNC: 3.7 MMOL/L — SIGNIFICANT CHANGE UP (ref 3.5–5.3)
PROT SERPL-MCNC: 7.3 G/DL — SIGNIFICANT CHANGE UP (ref 6–8.3)
RBC # BLD: 3.91 M/UL — SIGNIFICANT CHANGE UP (ref 3.8–5.2)
RBC # FLD: 18.1 % — HIGH (ref 10.3–14.5)
SODIUM SERPL-SCNC: 138 MMOL/L — SIGNIFICANT CHANGE UP (ref 135–145)
T4 FREE SERPL-MCNC: 1.13 NG/DL — SIGNIFICANT CHANGE UP (ref 0.93–1.7)
TSH SERPL-MCNC: 2.93 UIU/ML — SIGNIFICANT CHANGE UP (ref 0.27–4.2)
WBC # BLD: 11.2 K/UL — HIGH (ref 3.8–10.5)
WBC # FLD AUTO: 11.2 K/UL — HIGH (ref 3.8–10.5)

## 2025-07-03 PROCEDURE — 80053 COMPREHEN METABOLIC PANEL: CPT

## 2025-07-03 PROCEDURE — 36415 COLL VENOUS BLD VENIPUNCTURE: CPT

## 2025-07-03 PROCEDURE — 83735 ASSAY OF MAGNESIUM: CPT

## 2025-07-03 PROCEDURE — 84439 ASSAY OF FREE THYROXINE: CPT

## 2025-07-03 PROCEDURE — 84443 ASSAY THYROID STIM HORMONE: CPT

## 2025-07-03 PROCEDURE — 96375 TX/PRO/DX INJ NEW DRUG ADDON: CPT

## 2025-07-03 PROCEDURE — 85025 COMPLETE CBC W/AUTO DIFF WBC: CPT

## 2025-07-03 PROCEDURE — 96413 CHEMO IV INFUSION 1 HR: CPT

## 2025-07-03 RX ORDER — ENFORTUMAB VEDOTIN 30 MG/3.3ML
90 INJECTION, POWDER, LYOPHILIZED, FOR SOLUTION INTRAVENOUS ONCE
Refills: 0 | Status: COMPLETED | OUTPATIENT
Start: 2025-07-03 | End: 2025-07-03

## 2025-07-03 RX ORDER — DEXAMETHASONE 0.5 MG/1
12 TABLET ORAL ONCE
Refills: 0 | Status: COMPLETED | OUTPATIENT
Start: 2025-07-03 | End: 2025-07-03

## 2025-07-03 RX ORDER — LIDOCAINE HYDROCHLORIDE 20 MG/ML
1 JELLY TOPICAL ONCE
Refills: 0 | Status: COMPLETED | OUTPATIENT
Start: 2025-07-03 | End: 2025-07-03

## 2025-07-03 RX ORDER — PALONOSETRON HYDROCHLORIDE 0.05 MG/ML
0.25 INJECTION, SOLUTION INTRAVENOUS ONCE
Refills: 0 | Status: COMPLETED | OUTPATIENT
Start: 2025-07-03 | End: 2025-07-03

## 2025-07-03 RX ADMIN — Medication 10 MILLILITER(S): at 14:15

## 2025-07-03 RX ADMIN — PALONOSETRON HYDROCHLORIDE 0.25 MILLIGRAM(S): 0.05 INJECTION, SOLUTION INTRAVENOUS at 12:36

## 2025-07-03 RX ADMIN — DEXAMETHASONE 212 MILLIGRAM(S): 0.5 TABLET ORAL at 12:36

## 2025-07-03 RX ADMIN — DEXAMETHASONE 12 MILLIGRAM(S): 0.5 TABLET ORAL at 12:53

## 2025-07-03 RX ADMIN — ENFORTUMAB VEDOTIN 90 MILLIGRAM(S): 30 INJECTION, POWDER, LYOPHILIZED, FOR SOLUTION INTRAVENOUS at 13:35

## 2025-07-03 RX ADMIN — ENFORTUMAB VEDOTIN 90 MILLIGRAM(S): 30 INJECTION, POWDER, LYOPHILIZED, FOR SOLUTION INTRAVENOUS at 14:10

## 2025-07-08 ENCOUNTER — APPOINTMENT (OUTPATIENT)
Dept: UROLOGY | Facility: CLINIC | Age: 70
End: 2025-07-08
Payer: MEDICARE

## 2025-07-08 VITALS
OXYGEN SATURATION: 98 % | SYSTOLIC BLOOD PRESSURE: 120 MMHG | TEMPERATURE: 96 F | HEART RATE: 82 BPM | DIASTOLIC BLOOD PRESSURE: 82 MMHG

## 2025-07-08 PROCEDURE — 99214 OFFICE O/P EST MOD 30 MIN: CPT

## 2025-07-16 ENCOUNTER — APPOINTMENT (OUTPATIENT)
Dept: HEMATOLOGY ONCOLOGY | Facility: CLINIC | Age: 70
End: 2025-07-16
Payer: MEDICARE

## 2025-07-16 ENCOUNTER — APPOINTMENT (OUTPATIENT)
Dept: INFUSION THERAPY | Facility: CLINIC | Age: 70
End: 2025-07-16

## 2025-07-16 ENCOUNTER — OUTPATIENT (OUTPATIENT)
Dept: OUTPATIENT SERVICES | Facility: HOSPITAL | Age: 70
LOS: 1 days | End: 2025-07-16
Payer: MEDICARE

## 2025-07-16 ENCOUNTER — NON-APPOINTMENT (OUTPATIENT)
Age: 70
End: 2025-07-16

## 2025-07-16 VITALS
TEMPERATURE: 98 F | HEIGHT: 63 IN | SYSTOLIC BLOOD PRESSURE: 119 MMHG | DIASTOLIC BLOOD PRESSURE: 68 MMHG | OXYGEN SATURATION: 96 % | HEART RATE: 77 BPM | WEIGHT: 160.94 LBS | RESPIRATION RATE: 16 BRPM

## 2025-07-16 VITALS
HEART RATE: 101 BPM | DIASTOLIC BLOOD PRESSURE: 72 MMHG | WEIGHT: 161.13 LBS | BODY MASS INDEX: 28.55 KG/M2 | HEIGHT: 63 IN | RESPIRATION RATE: 18 BRPM | TEMPERATURE: 98 F | OXYGEN SATURATION: 98 % | SYSTOLIC BLOOD PRESSURE: 104 MMHG

## 2025-07-16 DIAGNOSIS — C67.9 MALIGNANT NEOPLASM OF BLADDER, UNSPECIFIED: ICD-10-CM

## 2025-07-16 DIAGNOSIS — Z90.79 ACQUIRED ABSENCE OF OTHER GENITAL ORGAN(S): Chronic | ICD-10-CM

## 2025-07-16 DIAGNOSIS — N75.0 CYST OF BARTHOLIN'S GLAND: Chronic | ICD-10-CM

## 2025-07-16 DIAGNOSIS — Z98.890 OTHER SPECIFIED POSTPROCEDURAL STATES: Chronic | ICD-10-CM

## 2025-07-16 LAB
ALBUMIN SERPL ELPH-MCNC: 3.3 G/DL
ALP BLD-CCNC: 69 U/L
ALT SERPL-CCNC: 15 U/L
ANION GAP SERPL CALC-SCNC: 4 MMOL/L
AST SERPL-CCNC: 22 U/L
BILIRUB SERPL-MCNC: 0.4 MG/DL
BUN SERPL-MCNC: 12 MG/DL
CALCIUM SERPL-MCNC: 9.6 MG/DL
CHLORIDE SERPL-SCNC: 105 MMOL/L
CO2 SERPL-SCNC: 26 MMOL/L
CREAT SERPL-MCNC: 0.4 MG/DL
EGFRCR SERPLBLD CKD-EPI 2021: 105 ML/MIN/1.73M2
GLUCOSE SERPL-MCNC: 100 MG/DL
HCT VFR BLD CALC: 30.4 %
HGB BLD-MCNC: 9.8 G/DL
LYMPHOCYTES # BLD AUTO: 2 K/UL
LYMPHOCYTES NFR BLD AUTO: 14.8 %
MAN DIFF?: NO
MCHC RBC-ENTMCNC: 26.6 PG
MCHC RBC-ENTMCNC: 32.2 G/DL
MCV RBC AUTO: 82.4 FL
NEUTROPHILS # BLD AUTO: 10.4 K/UL
NEUTROPHILS NFR BLD AUTO: 75.8 %
PLATELET # BLD AUTO: 419 K/UL
POTASSIUM SERPL-SCNC: 4.4 MMOL/L
PROT SERPL-MCNC: 7.1 G/DL
RBC # BLD: 3.69 M/UL
RBC # FLD: 18.2 %
SODIUM SERPL-SCNC: 135 MMOL/L
WBC # FLD AUTO: 13.7 K/UL

## 2025-07-16 PROCEDURE — 99214 OFFICE O/P EST MOD 30 MIN: CPT | Mod: 25

## 2025-07-16 PROCEDURE — 96375 TX/PRO/DX INJ NEW DRUG ADDON: CPT

## 2025-07-16 PROCEDURE — 96413 CHEMO IV INFUSION 1 HR: CPT

## 2025-07-16 PROCEDURE — 36415 COLL VENOUS BLD VENIPUNCTURE: CPT

## 2025-07-16 PROCEDURE — 96417 CHEMO IV INFUS EACH ADDL SEQ: CPT

## 2025-07-16 RX ORDER — PEMBROLIZUMAB 50 MG/2ML
200 INJECTION, POWDER, LYOPHILIZED, FOR SOLUTION INTRAVENOUS ONCE
Refills: 0 | Status: COMPLETED | OUTPATIENT
Start: 2025-07-16 | End: 2025-07-16

## 2025-07-16 RX ORDER — DEXAMETHASONE 0.5 MG/1
12 TABLET ORAL ONCE
Refills: 0 | Status: COMPLETED | OUTPATIENT
Start: 2025-07-16 | End: 2025-07-16

## 2025-07-16 RX ORDER — ENFORTUMAB VEDOTIN 30 MG/3.3ML
90 INJECTION, POWDER, LYOPHILIZED, FOR SOLUTION INTRAVENOUS ONCE
Refills: 0 | Status: COMPLETED | OUTPATIENT
Start: 2025-07-16 | End: 2025-07-16

## 2025-07-16 RX ORDER — LIDOCAINE HYDROCHLORIDE 20 MG/ML
1 JELLY TOPICAL ONCE
Refills: 0 | Status: COMPLETED | OUTPATIENT
Start: 2025-07-16 | End: 2025-07-16

## 2025-07-16 RX ORDER — PALONOSETRON HYDROCHLORIDE 0.05 MG/ML
0.25 INJECTION, SOLUTION INTRAVENOUS ONCE
Refills: 0 | Status: COMPLETED | OUTPATIENT
Start: 2025-07-16 | End: 2025-07-16

## 2025-07-16 RX ADMIN — DEXAMETHASONE 12 MILLIGRAM(S): 0.5 TABLET ORAL at 16:35

## 2025-07-16 RX ADMIN — Medication 10 MILLILITER(S): at 17:20

## 2025-07-16 RX ADMIN — PEMBROLIZUMAB 200 MILLIGRAM(S): 50 INJECTION, POWDER, LYOPHILIZED, FOR SOLUTION INTRAVENOUS at 15:31

## 2025-07-16 RX ADMIN — ENFORTUMAB VEDOTIN 90 MILLIGRAM(S): 30 INJECTION, POWDER, LYOPHILIZED, FOR SOLUTION INTRAVENOUS at 16:40

## 2025-07-16 RX ADMIN — ENFORTUMAB VEDOTIN 90 MILLIGRAM(S): 30 INJECTION, POWDER, LYOPHILIZED, FOR SOLUTION INTRAVENOUS at 17:15

## 2025-07-16 RX ADMIN — PEMBROLIZUMAB 200 MILLIGRAM(S): 50 INJECTION, POWDER, LYOPHILIZED, FOR SOLUTION INTRAVENOUS at 16:07

## 2025-07-16 RX ADMIN — PALONOSETRON HYDROCHLORIDE 0.25 MILLIGRAM(S): 0.05 INJECTION, SOLUTION INTRAVENOUS at 16:10

## 2025-07-16 RX ADMIN — DEXAMETHASONE 212 MILLIGRAM(S): 0.5 TABLET ORAL at 16:15

## 2025-07-23 RX ORDER — PALONOSETRON HYDROCHLORIDE 0.05 MG/ML
0.25 INJECTION, SOLUTION INTRAVENOUS ONCE
Refills: 0 | Status: COMPLETED | OUTPATIENT
Start: 2025-07-24 | End: 2025-07-24

## 2025-07-23 RX ORDER — DEXAMETHASONE 0.5 MG/1
12 TABLET ORAL ONCE
Refills: 0 | Status: COMPLETED | OUTPATIENT
Start: 2025-07-24 | End: 2025-07-24

## 2025-07-23 RX ORDER — ENFORTUMAB VEDOTIN 30 MG/3.3ML
90 INJECTION, POWDER, LYOPHILIZED, FOR SOLUTION INTRAVENOUS ONCE
Refills: 0 | Status: COMPLETED | OUTPATIENT
Start: 2025-07-24 | End: 2025-07-24

## 2025-07-23 RX ORDER — LIDOCAINE HYDROCHLORIDE 20 MG/ML
1 JELLY TOPICAL ONCE
Refills: 0 | Status: COMPLETED | OUTPATIENT
Start: 2025-07-24 | End: 2025-07-24

## 2025-07-24 ENCOUNTER — APPOINTMENT (OUTPATIENT)
Dept: HEMATOLOGY ONCOLOGY | Facility: CLINIC | Age: 70
End: 2025-07-24

## 2025-07-24 ENCOUNTER — OUTPATIENT (OUTPATIENT)
Dept: OUTPATIENT SERVICES | Facility: HOSPITAL | Age: 70
LOS: 1 days | End: 2025-07-24
Payer: MEDICARE

## 2025-07-24 ENCOUNTER — APPOINTMENT (OUTPATIENT)
Dept: INFUSION THERAPY | Facility: CLINIC | Age: 70
End: 2025-07-24

## 2025-07-24 VITALS
DIASTOLIC BLOOD PRESSURE: 83 MMHG | WEIGHT: 160.06 LBS | OXYGEN SATURATION: 97 % | HEIGHT: 63 IN | TEMPERATURE: 98 F | HEART RATE: 72 BPM | RESPIRATION RATE: 17 BRPM | SYSTOLIC BLOOD PRESSURE: 146 MMHG

## 2025-07-24 VITALS
SYSTOLIC BLOOD PRESSURE: 132 MMHG | HEART RATE: 78 BPM | RESPIRATION RATE: 18 BRPM | OXYGEN SATURATION: 96 % | DIASTOLIC BLOOD PRESSURE: 75 MMHG | TEMPERATURE: 98 F

## 2025-07-24 DIAGNOSIS — N75.0 CYST OF BARTHOLIN'S GLAND: Chronic | ICD-10-CM

## 2025-07-24 DIAGNOSIS — Z90.79 ACQUIRED ABSENCE OF OTHER GENITAL ORGAN(S): Chronic | ICD-10-CM

## 2025-07-24 DIAGNOSIS — Z98.890 OTHER SPECIFIED POSTPROCEDURAL STATES: Chronic | ICD-10-CM

## 2025-07-24 DIAGNOSIS — C67.9 MALIGNANT NEOPLASM OF BLADDER, UNSPECIFIED: ICD-10-CM

## 2025-07-24 LAB
ALBUMIN SERPL ELPH-MCNC: 3.4 G/DL — SIGNIFICANT CHANGE UP (ref 3.3–5)
ALP SERPL-CCNC: 80 U/L — SIGNIFICANT CHANGE UP (ref 40–120)
ALT FLD-CCNC: 21 U/L — SIGNIFICANT CHANGE UP (ref 10–45)
ANION GAP SERPL CALC-SCNC: 6 MMOL/L — SIGNIFICANT CHANGE UP (ref 5–17)
AST SERPL-CCNC: 30 U/L — SIGNIFICANT CHANGE UP (ref 10–40)
BILIRUB SERPL-MCNC: 0.5 MG/DL — SIGNIFICANT CHANGE UP (ref 0.2–1.2)
BUN SERPL-MCNC: 10 MG/DL — SIGNIFICANT CHANGE UP (ref 7–23)
CALCIUM SERPL-MCNC: 9.8 MG/DL — SIGNIFICANT CHANGE UP (ref 8.4–10.5)
CHLORIDE SERPL-SCNC: 106 MMOL/L — SIGNIFICANT CHANGE UP (ref 96–108)
CO2 SERPL-SCNC: 26 MMOL/L — SIGNIFICANT CHANGE UP (ref 22–31)
CREAT SERPL-MCNC: 0.6 MG/DL — SIGNIFICANT CHANGE UP (ref 0.5–1.3)
EGFR: 95 ML/MIN/1.73M2 — SIGNIFICANT CHANGE UP
EGFR: 95 ML/MIN/1.73M2 — SIGNIFICANT CHANGE UP
GLUCOSE SERPL-MCNC: 100 MG/DL — HIGH (ref 70–99)
HCT VFR BLD CALC: 32.2 % — LOW (ref 34.5–45)
HGB BLD-MCNC: 10.4 G/DL — LOW (ref 11.5–15.5)
LYMPHOCYTES # BLD AUTO: 17.8 % — SIGNIFICANT CHANGE UP (ref 13–44)
LYMPHOCYTES # BLD AUTO: 2.2 K/UL — SIGNIFICANT CHANGE UP (ref 1–3.3)
MAGNESIUM SERPL-MCNC: 2.1 MG/DL — SIGNIFICANT CHANGE UP (ref 1.6–2.6)
MCHC RBC-ENTMCNC: 26.5 PG — LOW (ref 27–34)
MCHC RBC-ENTMCNC: 32.3 G/DL — SIGNIFICANT CHANGE UP (ref 32–36)
MCV RBC AUTO: 81.9 FL — SIGNIFICANT CHANGE UP (ref 80–100)
NEUTROPHILS # BLD AUTO: 8.3 K/UL — HIGH (ref 1.8–7.4)
NEUTROPHILS NFR BLD AUTO: 69 % — SIGNIFICANT CHANGE UP (ref 43–77)
PLATELET # BLD AUTO: 492 K/UL — HIGH (ref 150–400)
POTASSIUM SERPL-MCNC: 4.1 MMOL/L — SIGNIFICANT CHANGE UP (ref 3.5–5.3)
POTASSIUM SERPL-SCNC: 4.1 MMOL/L — SIGNIFICANT CHANGE UP (ref 3.5–5.3)
PROT SERPL-MCNC: 7.3 G/DL — SIGNIFICANT CHANGE UP (ref 6–8.3)
RBC # BLD: 3.93 M/UL — SIGNIFICANT CHANGE UP (ref 3.8–5.2)
RBC # FLD: 18.3 % — HIGH (ref 10.3–14.5)
SODIUM SERPL-SCNC: 138 MMOL/L — SIGNIFICANT CHANGE UP (ref 135–145)
WBC # BLD: 12.1 K/UL — HIGH (ref 3.8–10.5)
WBC # FLD AUTO: 12.1 K/UL — HIGH (ref 3.8–10.5)

## 2025-07-24 PROCEDURE — 36415 COLL VENOUS BLD VENIPUNCTURE: CPT

## 2025-07-24 PROCEDURE — 80053 COMPREHEN METABOLIC PANEL: CPT

## 2025-07-24 PROCEDURE — 96413 CHEMO IV INFUSION 1 HR: CPT

## 2025-07-24 PROCEDURE — 83735 ASSAY OF MAGNESIUM: CPT

## 2025-07-24 PROCEDURE — 85025 COMPLETE CBC W/AUTO DIFF WBC: CPT

## 2025-07-24 PROCEDURE — 96375 TX/PRO/DX INJ NEW DRUG ADDON: CPT

## 2025-07-24 RX ADMIN — DEXAMETHASONE 12 MILLIGRAM(S): 0.5 TABLET ORAL at 15:20

## 2025-07-24 RX ADMIN — ENFORTUMAB VEDOTIN 90 MILLIGRAM(S): 30 INJECTION, POWDER, LYOPHILIZED, FOR SOLUTION INTRAVENOUS at 16:07

## 2025-07-24 RX ADMIN — ENFORTUMAB VEDOTIN 90 MILLIGRAM(S): 30 INJECTION, POWDER, LYOPHILIZED, FOR SOLUTION INTRAVENOUS at 15:37

## 2025-07-24 RX ADMIN — Medication 10 MILLILITER(S): at 16:10

## 2025-07-24 RX ADMIN — PALONOSETRON HYDROCHLORIDE 0.25 MILLIGRAM(S): 0.05 INJECTION, SOLUTION INTRAVENOUS at 15:01

## 2025-07-24 RX ADMIN — DEXAMETHASONE 212 MILLIGRAM(S): 0.5 TABLET ORAL at 15:05

## 2025-08-07 ENCOUNTER — OUTPATIENT (OUTPATIENT)
Dept: OUTPATIENT SERVICES | Facility: HOSPITAL | Age: 70
LOS: 1 days | End: 2025-08-07
Payer: MEDICARE

## 2025-08-07 ENCOUNTER — APPOINTMENT (OUTPATIENT)
Dept: HEMATOLOGY ONCOLOGY | Facility: CLINIC | Age: 70
End: 2025-08-07
Payer: MEDICARE

## 2025-08-07 ENCOUNTER — APPOINTMENT (OUTPATIENT)
Dept: INFUSION THERAPY | Facility: CLINIC | Age: 70
End: 2025-08-07

## 2025-08-07 ENCOUNTER — NON-APPOINTMENT (OUTPATIENT)
Age: 70
End: 2025-08-07

## 2025-08-07 VITALS
DIASTOLIC BLOOD PRESSURE: 74 MMHG | TEMPERATURE: 98 F | OXYGEN SATURATION: 98 % | WEIGHT: 162.04 LBS | HEART RATE: 76 BPM | SYSTOLIC BLOOD PRESSURE: 126 MMHG | HEIGHT: 63 IN | RESPIRATION RATE: 17 BRPM

## 2025-08-07 VITALS
SYSTOLIC BLOOD PRESSURE: 130 MMHG | TEMPERATURE: 98 F | RESPIRATION RATE: 16 BRPM | DIASTOLIC BLOOD PRESSURE: 68 MMHG | OXYGEN SATURATION: 98 % | HEART RATE: 78 BPM

## 2025-08-07 VITALS
HEART RATE: 101 BPM | DIASTOLIC BLOOD PRESSURE: 73 MMHG | SYSTOLIC BLOOD PRESSURE: 142 MMHG | RESPIRATION RATE: 18 BRPM | OXYGEN SATURATION: 98 % | HEIGHT: 63 IN | TEMPERATURE: 97.4 F | WEIGHT: 163 LBS | BODY MASS INDEX: 28.88 KG/M2

## 2025-08-07 DIAGNOSIS — K21.9 GASTRO-ESOPHAGEAL REFLUX DISEASE W/OUT ESOPHAGITIS: ICD-10-CM

## 2025-08-07 DIAGNOSIS — Z98.890 OTHER SPECIFIED POSTPROCEDURAL STATES: Chronic | ICD-10-CM

## 2025-08-07 DIAGNOSIS — C67.9 MALIGNANT NEOPLASM OF BLADDER, UNSPECIFIED: ICD-10-CM

## 2025-08-07 DIAGNOSIS — D64.9 ANEMIA, UNSPECIFIED: ICD-10-CM

## 2025-08-07 DIAGNOSIS — K59.00 CONSTIPATION, UNSPECIFIED: ICD-10-CM

## 2025-08-07 DIAGNOSIS — M79.89 OTHER SPECIFIED SOFT TISSUE DISORDERS: ICD-10-CM

## 2025-08-07 DIAGNOSIS — Z90.79 ACQUIRED ABSENCE OF OTHER GENITAL ORGAN(S): Chronic | ICD-10-CM

## 2025-08-07 LAB
ALBUMIN SERPL ELPH-MCNC: 3.1 G/DL
ALP BLD-CCNC: 72 U/L
ALT SERPL-CCNC: 16 U/L
ANION GAP SERPL CALC-SCNC: 9 MMOL/L
AST SERPL-CCNC: 19 U/L
BILIRUB SERPL-MCNC: 0.4 MG/DL
BUN SERPL-MCNC: 12 MG/DL
CALCIUM SERPL-MCNC: 9.4 MG/DL
CHLORIDE SERPL-SCNC: 109 MMOL/L
CO2 SERPL-SCNC: 23 MMOL/L
CREAT SERPL-MCNC: 0.5 MG/DL
EGFRCR SERPLBLD CKD-EPI 2021: 100 ML/MIN/1.73M2
GLUCOSE SERPL-MCNC: 119 MG/DL
HCT VFR BLD CALC: 28 %
HGB BLD-MCNC: 9.1 G/DL
LYMPHOCYTES # BLD AUTO: 1.3 K/UL
LYMPHOCYTES NFR BLD AUTO: 13.1 %
MAN DIFF?: NO
MCHC RBC-ENTMCNC: 26 PG
MCHC RBC-ENTMCNC: 32.5 G/DL
MCV RBC AUTO: 80 FL
NEUTROPHILS # BLD AUTO: 7.9 K/UL
NEUTROPHILS NFR BLD AUTO: 77.1 %
PLATELET # BLD AUTO: 468 K/UL
POTASSIUM SERPL-SCNC: 4.9 MMOL/L
PROT SERPL-MCNC: 7.3 G/DL
RBC # BLD: 3.5 M/UL
RBC # FLD: 18.2 %
SODIUM SERPL-SCNC: 141 MMOL/L
WBC # FLD AUTO: 10.2 K/UL

## 2025-08-07 PROCEDURE — 96413 CHEMO IV INFUSION 1 HR: CPT

## 2025-08-07 PROCEDURE — 36415 COLL VENOUS BLD VENIPUNCTURE: CPT

## 2025-08-07 PROCEDURE — 96417 CHEMO IV INFUS EACH ADDL SEQ: CPT

## 2025-08-07 PROCEDURE — 99214 OFFICE O/P EST MOD 30 MIN: CPT | Mod: 25

## 2025-08-07 PROCEDURE — 96375 TX/PRO/DX INJ NEW DRUG ADDON: CPT

## 2025-08-07 RX ORDER — ENFORTUMAB VEDOTIN 30 MG/3.3ML
90 INJECTION, POWDER, LYOPHILIZED, FOR SOLUTION INTRAVENOUS ONCE
Refills: 0 | Status: COMPLETED | OUTPATIENT
Start: 2025-08-07 | End: 2025-08-07

## 2025-08-07 RX ORDER — PANTOPRAZOLE 20 MG/1
20 TABLET, DELAYED RELEASE ORAL
Qty: 30 | Refills: 4 | Status: ACTIVE | COMMUNITY
Start: 2025-08-07 | End: 1900-01-01

## 2025-08-07 RX ORDER — PEMBROLIZUMAB 50 MG/2ML
200 INJECTION, POWDER, LYOPHILIZED, FOR SOLUTION INTRAVENOUS ONCE
Refills: 0 | Status: COMPLETED | OUTPATIENT
Start: 2025-08-07 | End: 2025-08-07

## 2025-08-07 RX ORDER — LIDOCAINE HYDROCHLORIDE 20 MG/ML
1 JELLY TOPICAL ONCE
Refills: 0 | Status: COMPLETED | OUTPATIENT
Start: 2025-08-07 | End: 2025-08-07

## 2025-08-07 RX ORDER — DEXAMETHASONE 0.5 MG/1
12 TABLET ORAL ONCE
Refills: 0 | Status: COMPLETED | OUTPATIENT
Start: 2025-08-07 | End: 2025-08-07

## 2025-08-07 RX ORDER — PALONOSETRON HYDROCHLORIDE 0.05 MG/ML
0.25 INJECTION, SOLUTION INTRAVENOUS ONCE
Refills: 0 | Status: COMPLETED | OUTPATIENT
Start: 2025-08-07 | End: 2025-08-07

## 2025-08-07 RX ORDER — ALTEPLASE 2.2 MG/2ML
2 INJECTION, POWDER, LYOPHILIZED, FOR SOLUTION INTRAVENOUS ONCE
Refills: 0 | Status: COMPLETED | OUTPATIENT
Start: 2025-08-07 | End: 2025-08-07

## 2025-08-07 RX ADMIN — ALTEPLASE 2 MILLIGRAM(S): 2.2 INJECTION, POWDER, LYOPHILIZED, FOR SOLUTION INTRAVENOUS at 00:34

## 2025-08-07 RX ADMIN — PEMBROLIZUMAB 200 MILLIGRAM(S): 50 INJECTION, POWDER, LYOPHILIZED, FOR SOLUTION INTRAVENOUS at 14:15

## 2025-08-07 RX ADMIN — PEMBROLIZUMAB 200 MILLIGRAM(S): 50 INJECTION, POWDER, LYOPHILIZED, FOR SOLUTION INTRAVENOUS at 13:45

## 2025-08-07 RX ADMIN — DEXAMETHASONE 12 MILLIGRAM(S): 0.5 TABLET ORAL at 14:35

## 2025-08-07 RX ADMIN — Medication 10 MILLILITER(S): at 15:00

## 2025-08-07 RX ADMIN — ENFORTUMAB VEDOTIN 90 MILLIGRAM(S): 30 INJECTION, POWDER, LYOPHILIZED, FOR SOLUTION INTRAVENOUS at 14:20

## 2025-08-07 RX ADMIN — ENFORTUMAB VEDOTIN 90 MILLIGRAM(S): 30 INJECTION, POWDER, LYOPHILIZED, FOR SOLUTION INTRAVENOUS at 14:55

## 2025-08-07 RX ADMIN — DEXAMETHASONE 212 MILLIGRAM(S): 0.5 TABLET ORAL at 14:20

## 2025-08-07 RX ADMIN — PALONOSETRON HYDROCHLORIDE 0.25 MILLIGRAM(S): 0.05 INJECTION, SOLUTION INTRAVENOUS at 13:51

## 2025-08-08 ENCOUNTER — NON-APPOINTMENT (OUTPATIENT)
Age: 70
End: 2025-08-08

## 2025-08-08 LAB — TSH SERPL-ACNC: 3.76 UIU/ML

## 2025-08-14 ENCOUNTER — APPOINTMENT (OUTPATIENT)
Dept: HEMATOLOGY ONCOLOGY | Facility: CLINIC | Age: 70
End: 2025-08-14

## 2025-08-14 ENCOUNTER — OUTPATIENT (OUTPATIENT)
Dept: OUTPATIENT SERVICES | Facility: HOSPITAL | Age: 70
LOS: 1 days | End: 2025-08-14
Payer: MEDICARE

## 2025-08-14 ENCOUNTER — APPOINTMENT (OUTPATIENT)
Dept: INFUSION THERAPY | Facility: CLINIC | Age: 70
End: 2025-08-14

## 2025-08-14 VITALS
TEMPERATURE: 99 F | WEIGHT: 160.94 LBS | RESPIRATION RATE: 16 BRPM | SYSTOLIC BLOOD PRESSURE: 124 MMHG | HEIGHT: 63 IN | HEART RATE: 92 BPM | DIASTOLIC BLOOD PRESSURE: 70 MMHG | OXYGEN SATURATION: 97 %

## 2025-08-14 VITALS
TEMPERATURE: 99 F | OXYGEN SATURATION: 98 % | DIASTOLIC BLOOD PRESSURE: 65 MMHG | SYSTOLIC BLOOD PRESSURE: 111 MMHG | RESPIRATION RATE: 18 BRPM | HEART RATE: 78 BPM

## 2025-08-14 DIAGNOSIS — Z98.890 OTHER SPECIFIED POSTPROCEDURAL STATES: Chronic | ICD-10-CM

## 2025-08-14 DIAGNOSIS — N75.0 CYST OF BARTHOLIN'S GLAND: Chronic | ICD-10-CM

## 2025-08-14 DIAGNOSIS — Z90.79 ACQUIRED ABSENCE OF OTHER GENITAL ORGAN(S): Chronic | ICD-10-CM

## 2025-08-14 DIAGNOSIS — C67.9 MALIGNANT NEOPLASM OF BLADDER, UNSPECIFIED: ICD-10-CM

## 2025-08-14 LAB
ALBUMIN SERPL ELPH-MCNC: 2.8 G/DL — LOW (ref 3.3–5)
ALP SERPL-CCNC: 93 U/L — SIGNIFICANT CHANGE UP (ref 40–120)
ALT FLD-CCNC: 19 U/L — SIGNIFICANT CHANGE UP (ref 10–45)
ANION GAP SERPL CALC-SCNC: 11 MMOL/L — SIGNIFICANT CHANGE UP (ref 5–17)
AST SERPL-CCNC: 26 U/L — SIGNIFICANT CHANGE UP (ref 10–40)
BASOPHILS # BLD AUTO: 0.03 K/UL — SIGNIFICANT CHANGE UP (ref 0–0.2)
BASOPHILS NFR BLD AUTO: 0.3 % — SIGNIFICANT CHANGE UP (ref 0–2)
BILIRUB SERPL-MCNC: 0.4 MG/DL — SIGNIFICANT CHANGE UP (ref 0.2–1.2)
BUN SERPL-MCNC: 11 MG/DL — SIGNIFICANT CHANGE UP (ref 7–23)
CALCIUM SERPL-MCNC: 9.4 MG/DL — SIGNIFICANT CHANGE UP (ref 8.4–10.5)
CHLORIDE SERPL-SCNC: 104 MMOL/L — SIGNIFICANT CHANGE UP (ref 96–108)
CO2 SERPL-SCNC: 25 MMOL/L — SIGNIFICANT CHANGE UP (ref 22–31)
CREAT SERPL-MCNC: 0.5 MG/DL — SIGNIFICANT CHANGE UP (ref 0.5–1.3)
EGFR: 100 ML/MIN/1.73M2 — SIGNIFICANT CHANGE UP
EGFR: 100 ML/MIN/1.73M2 — SIGNIFICANT CHANGE UP
EOSINOPHIL # BLD AUTO: 0.17 K/UL — SIGNIFICANT CHANGE UP (ref 0–0.5)
EOSINOPHIL NFR BLD AUTO: 1.6 % — SIGNIFICANT CHANGE UP (ref 0–6)
GLUCOSE SERPL-MCNC: 140 MG/DL — HIGH (ref 70–99)
HCT VFR BLD CALC: 29.7 % — LOW (ref 34.5–45)
HGB BLD-MCNC: 9.1 G/DL — LOW (ref 11.5–15.5)
IMM GRANULOCYTES # BLD AUTO: 0.06 K/UL — SIGNIFICANT CHANGE UP (ref 0–0.07)
IMM GRANULOCYTES NFR BLD AUTO: 0.6 % — SIGNIFICANT CHANGE UP (ref 0–0.9)
LYMPHOCYTES # BLD AUTO: 1.46 K/UL — SIGNIFICANT CHANGE UP (ref 1–3.3)
LYMPHOCYTES NFR BLD AUTO: 13.7 % — SIGNIFICANT CHANGE UP (ref 13–44)
MAGNESIUM SERPL-MCNC: 2.4 MG/DL — SIGNIFICANT CHANGE UP (ref 1.6–2.6)
MCHC RBC-ENTMCNC: 25.3 PG — LOW (ref 27–34)
MCHC RBC-ENTMCNC: 30.6 G/DL — LOW (ref 32–36)
MCV RBC AUTO: 82.7 FL — SIGNIFICANT CHANGE UP (ref 80–100)
MONOCYTES # BLD AUTO: 1.38 K/UL — HIGH (ref 0–0.9)
MONOCYTES NFR BLD AUTO: 12.9 % — SIGNIFICANT CHANGE UP (ref 2–14)
NEUTROPHILS # BLD AUTO: 7.59 K/UL — HIGH (ref 1.8–7.4)
NEUTROPHILS NFR BLD AUTO: 70.9 % — SIGNIFICANT CHANGE UP (ref 43–77)
NRBC # BLD AUTO: 0 K/UL — SIGNIFICANT CHANGE UP (ref 0–0)
NRBC # FLD: 0 K/UL — SIGNIFICANT CHANGE UP (ref 0–0)
NRBC BLD AUTO-RTO: 0 /100 WBCS — SIGNIFICANT CHANGE UP (ref 0–0)
PLATELET # BLD AUTO: 459 K/UL — HIGH (ref 150–400)
PMV BLD: 9.6 FL — SIGNIFICANT CHANGE UP (ref 7–13)
POTASSIUM SERPL-MCNC: 4 MMOL/L — SIGNIFICANT CHANGE UP (ref 3.5–5.3)
POTASSIUM SERPL-SCNC: 4 MMOL/L — SIGNIFICANT CHANGE UP (ref 3.5–5.3)
PROT SERPL-MCNC: 7.2 G/DL — SIGNIFICANT CHANGE UP (ref 6–8.3)
RBC # BLD: 3.59 M/UL — LOW (ref 3.8–5.2)
RBC # FLD: 18 % — HIGH (ref 10.3–14.5)
SODIUM SERPL-SCNC: 140 MMOL/L — SIGNIFICANT CHANGE UP (ref 135–145)
T4 FREE SERPL-MCNC: 1.13 NG/DL — SIGNIFICANT CHANGE UP (ref 0.93–1.7)
TSH SERPL-MCNC: 6.23 UIU/ML — HIGH (ref 0.27–4.2)
WBC # BLD: 10.69 K/UL — HIGH (ref 3.8–10.5)
WBC # FLD AUTO: 10.69 K/UL — HIGH (ref 3.8–10.5)

## 2025-08-14 PROCEDURE — 96375 TX/PRO/DX INJ NEW DRUG ADDON: CPT

## 2025-08-14 PROCEDURE — 84439 ASSAY OF FREE THYROXINE: CPT

## 2025-08-14 PROCEDURE — 85025 COMPLETE CBC W/AUTO DIFF WBC: CPT

## 2025-08-14 PROCEDURE — 36415 COLL VENOUS BLD VENIPUNCTURE: CPT

## 2025-08-14 PROCEDURE — 83735 ASSAY OF MAGNESIUM: CPT

## 2025-08-14 PROCEDURE — 84443 ASSAY THYROID STIM HORMONE: CPT

## 2025-08-14 PROCEDURE — 96413 CHEMO IV INFUSION 1 HR: CPT

## 2025-08-14 PROCEDURE — 80053 COMPREHEN METABOLIC PANEL: CPT

## 2025-08-14 RX ORDER — ENFORTUMAB VEDOTIN 30 MG/3.3ML
90 INJECTION, POWDER, LYOPHILIZED, FOR SOLUTION INTRAVENOUS ONCE
Refills: 0 | Status: COMPLETED | OUTPATIENT
Start: 2025-08-14 | End: 2025-08-14

## 2025-08-14 RX ORDER — PALONOSETRON HYDROCHLORIDE 0.05 MG/ML
0.25 INJECTION, SOLUTION INTRAVENOUS ONCE
Refills: 0 | Status: COMPLETED | OUTPATIENT
Start: 2025-08-14 | End: 2025-08-14

## 2025-08-14 RX ORDER — LIDOCAINE HYDROCHLORIDE 20 MG/ML
1 JELLY TOPICAL ONCE
Refills: 0 | Status: COMPLETED | OUTPATIENT
Start: 2025-08-14 | End: 2025-08-14

## 2025-08-14 RX ORDER — DEXAMETHASONE 0.5 MG/1
12 TABLET ORAL ONCE
Refills: 0 | Status: COMPLETED | OUTPATIENT
Start: 2025-08-14 | End: 2025-08-14

## 2025-08-14 RX ADMIN — DEXAMETHASONE 212 MILLIGRAM(S): 0.5 TABLET ORAL at 14:45

## 2025-08-14 RX ADMIN — PALONOSETRON HYDROCHLORIDE 0.25 MILLIGRAM(S): 0.05 INJECTION, SOLUTION INTRAVENOUS at 15:00

## 2025-08-14 RX ADMIN — Medication 10 MILLILITER(S): at 15:56

## 2025-08-14 RX ADMIN — DEXAMETHASONE 12 MILLIGRAM(S): 0.5 TABLET ORAL at 15:00

## 2025-08-14 RX ADMIN — ENFORTUMAB VEDOTIN 90 MILLIGRAM(S): 30 INJECTION, POWDER, LYOPHILIZED, FOR SOLUTION INTRAVENOUS at 15:55

## 2025-08-14 RX ADMIN — ENFORTUMAB VEDOTIN 90 MILLIGRAM(S): 30 INJECTION, POWDER, LYOPHILIZED, FOR SOLUTION INTRAVENOUS at 15:22

## 2025-08-22 ENCOUNTER — APPOINTMENT (OUTPATIENT)
Dept: NUCLEAR MEDICINE | Facility: HOSPITAL | Age: 70
End: 2025-08-22

## 2025-08-22 ENCOUNTER — OUTPATIENT (OUTPATIENT)
Dept: OUTPATIENT SERVICES | Facility: HOSPITAL | Age: 70
LOS: 1 days | End: 2025-08-22
Payer: MEDICARE

## 2025-08-22 DIAGNOSIS — Z90.79 ACQUIRED ABSENCE OF OTHER GENITAL ORGAN(S): Chronic | ICD-10-CM

## 2025-08-22 DIAGNOSIS — Z98.890 OTHER SPECIFIED POSTPROCEDURAL STATES: Chronic | ICD-10-CM

## 2025-08-22 PROCEDURE — 82962 GLUCOSE BLOOD TEST: CPT

## 2025-08-22 PROCEDURE — 78815 PET IMAGE W/CT SKULL-THIGH: CPT | Mod: 26,PS

## 2025-08-28 ENCOUNTER — APPOINTMENT (OUTPATIENT)
Dept: HEMATOLOGY ONCOLOGY | Facility: CLINIC | Age: 70
End: 2025-08-28

## 2025-08-28 ENCOUNTER — APPOINTMENT (OUTPATIENT)
Dept: INFUSION THERAPY | Facility: CLINIC | Age: 70
End: 2025-08-28

## 2025-08-28 ENCOUNTER — NON-APPOINTMENT (OUTPATIENT)
Age: 70
End: 2025-08-28

## 2025-08-28 ENCOUNTER — OUTPATIENT (OUTPATIENT)
Dept: OUTPATIENT SERVICES | Facility: HOSPITAL | Age: 70
LOS: 1 days | End: 2025-08-28
Payer: MEDICARE

## 2025-08-28 VITALS
RESPIRATION RATE: 18 BRPM | HEART RATE: 82 BPM | SYSTOLIC BLOOD PRESSURE: 112 MMHG | TEMPERATURE: 98 F | OXYGEN SATURATION: 99 % | DIASTOLIC BLOOD PRESSURE: 65 MMHG

## 2025-08-28 VITALS
SYSTOLIC BLOOD PRESSURE: 144 MMHG | WEIGHT: 162 LBS | RESPIRATION RATE: 18 BRPM | HEART RATE: 104 BPM | BODY MASS INDEX: 28.7 KG/M2 | HEIGHT: 63 IN | TEMPERATURE: 97.8 F | OXYGEN SATURATION: 97 % | DIASTOLIC BLOOD PRESSURE: 71 MMHG

## 2025-08-28 VITALS
RESPIRATION RATE: 18 BRPM | WEIGHT: 162.04 LBS | SYSTOLIC BLOOD PRESSURE: 117 MMHG | TEMPERATURE: 98 F | HEART RATE: 88 BPM | DIASTOLIC BLOOD PRESSURE: 66 MMHG | HEIGHT: 63 IN

## 2025-08-28 DIAGNOSIS — C67.9 MALIGNANT NEOPLASM OF BLADDER, UNSPECIFIED: ICD-10-CM

## 2025-08-28 DIAGNOSIS — Z98.890 OTHER SPECIFIED POSTPROCEDURAL STATES: Chronic | ICD-10-CM

## 2025-08-28 DIAGNOSIS — Z90.79 ACQUIRED ABSENCE OF OTHER GENITAL ORGAN(S): Chronic | ICD-10-CM

## 2025-08-28 DIAGNOSIS — N75.0 CYST OF BARTHOLIN'S GLAND: Chronic | ICD-10-CM

## 2025-08-28 LAB
ALBUMIN SERPL ELPH-MCNC: 2.7 G/DL
ALP BLD-CCNC: 91 U/L
ALT SERPL-CCNC: 15 U/L
ANION GAP SERPL CALC-SCNC: 5 MMOL/L
AST SERPL-CCNC: 19 U/L
BILIRUB SERPL-MCNC: 0.4 MG/DL
BUN SERPL-MCNC: 15 MG/DL
CALCIUM SERPL-MCNC: 9.2 MG/DL
CHLORIDE SERPL-SCNC: 108 MMOL/L
CO2 SERPL-SCNC: 22 MMOL/L
CREAT SERPL-MCNC: 0.6 MG/DL
EGFRCR SERPLBLD CKD-EPI 2021: 95 ML/MIN/1.73M2
GLUCOSE SERPL-MCNC: 145 MG/DL
HCT VFR BLD CALC: 27.3 %
HGB BLD-MCNC: 8.6 G/DL
LYMPHOCYTES # BLD AUTO: 1.6 K/UL
LYMPHOCYTES NFR BLD AUTO: 10.7 %
MAN DIFF?: NO
MCHC RBC-ENTMCNC: 24.9 PG
MCHC RBC-ENTMCNC: 31.5 G/DL
MCV RBC AUTO: 78.9 FL
NEUTROPHILS # BLD AUTO: 12.3 K/UL
NEUTROPHILS NFR BLD AUTO: 80.2 %
PLATELET # BLD AUTO: 520 K/UL
POTASSIUM SERPL-SCNC: 4 MMOL/L
PROT SERPL-MCNC: 7.1 G/DL
RBC # BLD: 3.46 M/UL
RBC # FLD: 18.6 %
SODIUM SERPL-SCNC: 135 MMOL/L
WBC # FLD AUTO: 15.3 K/UL

## 2025-08-28 PROCEDURE — 96365 THER/PROPH/DIAG IV INF INIT: CPT

## 2025-08-28 PROCEDURE — 99214 OFFICE O/P EST MOD 30 MIN: CPT | Mod: 25

## 2025-08-28 PROCEDURE — 36415 COLL VENOUS BLD VENIPUNCTURE: CPT

## 2025-08-28 RX ORDER — IRON SUCROSE 20 MG/ML
300 INJECTION, SOLUTION INTRAVENOUS ONCE
Refills: 0 | Status: COMPLETED | OUTPATIENT
Start: 2025-08-28 | End: 2025-08-28

## 2025-08-28 RX ADMIN — IRON SUCROSE 300 MILLIGRAM(S): 20 INJECTION, SOLUTION INTRAVENOUS at 12:50

## 2025-08-28 RX ADMIN — IRON SUCROSE 176.67 MILLIGRAM(S): 20 INJECTION, SOLUTION INTRAVENOUS at 11:21

## 2025-08-29 LAB
CRP SERPL-MCNC: 214.1 MG/L
FERRITIN SERPL-MCNC: 198 NG/ML
IRON SATN MFR SERPL: 7 %
IRON SERPL-MCNC: 14 UG/DL
TIBC SERPL-MCNC: 215 UG/DL
UIBC SERPL-MCNC: 201 UG/DL

## 2025-09-02 ENCOUNTER — RX CHANGE (OUTPATIENT)
Age: 70
End: 2025-09-02

## 2025-09-04 ENCOUNTER — OUTPATIENT (OUTPATIENT)
Dept: OUTPATIENT SERVICES | Facility: HOSPITAL | Age: 70
LOS: 1 days | End: 2025-09-04
Payer: MEDICARE

## 2025-09-04 ENCOUNTER — APPOINTMENT (OUTPATIENT)
Dept: INFUSION THERAPY | Facility: CLINIC | Age: 70
End: 2025-09-04

## 2025-09-04 VITALS
HEIGHT: 63 IN | DIASTOLIC BLOOD PRESSURE: 70 MMHG | RESPIRATION RATE: 18 BRPM | HEART RATE: 71 BPM | WEIGHT: 100.09 LBS | OXYGEN SATURATION: 95 % | SYSTOLIC BLOOD PRESSURE: 131 MMHG | TEMPERATURE: 98 F

## 2025-09-04 DIAGNOSIS — C67.9 MALIGNANT NEOPLASM OF BLADDER, UNSPECIFIED: ICD-10-CM

## 2025-09-04 DIAGNOSIS — Z98.890 OTHER SPECIFIED POSTPROCEDURAL STATES: Chronic | ICD-10-CM

## 2025-09-04 DIAGNOSIS — Z90.79 ACQUIRED ABSENCE OF OTHER GENITAL ORGAN(S): Chronic | ICD-10-CM

## 2025-09-04 DIAGNOSIS — N75.0 CYST OF BARTHOLIN'S GLAND: Chronic | ICD-10-CM

## 2025-09-04 PROCEDURE — 96365 THER/PROPH/DIAG IV INF INIT: CPT

## 2025-09-04 RX ORDER — IRON SUCROSE 20 MG/ML
300 INJECTION, SOLUTION INTRAVENOUS ONCE
Refills: 0 | Status: COMPLETED | OUTPATIENT
Start: 2025-09-04 | End: 2025-09-04

## 2025-09-04 RX ADMIN — IRON SUCROSE 176.67 MILLIGRAM(S): 20 INJECTION, SOLUTION INTRAVENOUS at 16:12

## 2025-09-04 RX ADMIN — IRON SUCROSE 300 MILLIGRAM(S): 20 INJECTION, SOLUTION INTRAVENOUS at 17:45

## 2025-09-05 DIAGNOSIS — M19.90 UNSPECIFIED OSTEOARTHRITIS, UNSPECIFIED SITE: ICD-10-CM

## 2025-09-05 RX ORDER — PREDNISONE 20 MG/1
20 TABLET ORAL
Qty: 10 | Refills: 0 | Status: ACTIVE | COMMUNITY
Start: 2025-09-05 | End: 1900-01-01

## 2025-09-10 ENCOUNTER — APPOINTMENT (OUTPATIENT)
Dept: PALLIATIVE MEDICINE | Facility: CLINIC | Age: 70
End: 2025-09-10
Payer: MEDICARE

## 2025-09-10 VITALS
DIASTOLIC BLOOD PRESSURE: 69 MMHG | WEIGHT: 152.38 LBS | RESPIRATION RATE: 18 BRPM | HEART RATE: 102 BPM | TEMPERATURE: 97.8 F | SYSTOLIC BLOOD PRESSURE: 110 MMHG | OXYGEN SATURATION: 96 % | BODY MASS INDEX: 27 KG/M2 | HEIGHT: 63 IN

## 2025-09-10 DIAGNOSIS — K59.00 CONSTIPATION, UNSPECIFIED: ICD-10-CM

## 2025-09-10 DIAGNOSIS — Z51.5 ENCOUNTER FOR PALLIATIVE CARE: ICD-10-CM

## 2025-09-10 PROCEDURE — 99205 OFFICE O/P NEW HI 60 MIN: CPT

## 2025-09-10 RX ORDER — NALOXONE HYDROCHLORIDE NASAL 4 MG/.1ML
4 SPRAY NASAL
Qty: 1 | Refills: 0 | Status: ACTIVE | COMMUNITY
Start: 2025-09-10 | End: 1900-01-01

## 2025-09-11 ENCOUNTER — APPOINTMENT (OUTPATIENT)
Dept: INFUSION THERAPY | Facility: CLINIC | Age: 70
End: 2025-09-11

## 2025-09-12 RX ORDER — PREGABALIN 75 MG/1
75 CAPSULE ORAL
Qty: 60 | Refills: 2 | Status: ACTIVE | COMMUNITY
Start: 2025-09-10 | End: 1900-01-01

## 2025-09-15 ENCOUNTER — APPOINTMENT (OUTPATIENT)
Dept: PALLIATIVE MEDICINE | Facility: CLINIC | Age: 70
End: 2025-09-15

## 2025-09-15 ENCOUNTER — NON-APPOINTMENT (OUTPATIENT)
Age: 70
End: 2025-09-15

## 2025-09-15 RX ORDER — OXYCODONE 10 MG/1
10 TABLET ORAL EVERY 4 HOURS
Qty: 42 | Refills: 0 | Status: ACTIVE | COMMUNITY
Start: 2025-09-10 | End: 1900-01-01

## 2025-09-16 ENCOUNTER — NON-APPOINTMENT (OUTPATIENT)
Age: 70
End: 2025-09-16

## 2025-09-18 ENCOUNTER — APPOINTMENT (OUTPATIENT)
Dept: HEMATOLOGY ONCOLOGY | Facility: CLINIC | Age: 70
End: 2025-09-18

## 2025-09-18 ENCOUNTER — NON-APPOINTMENT (OUTPATIENT)
Age: 70
End: 2025-09-18

## 2025-09-18 DIAGNOSIS — R10.2 PELVIC AND PERINEAL PAIN: ICD-10-CM

## 2025-09-18 DIAGNOSIS — C67.9 MALIGNANT NEOPLASM OF BLADDER, UNSPECIFIED: ICD-10-CM

## 2025-09-18 RX ORDER — METRONIDAZOLE 500 MG/1
500 TABLET ORAL
Qty: 14 | Refills: 0 | Status: ACTIVE | COMMUNITY
Start: 2025-09-18 | End: 1900-01-01

## (undated) DEVICE — SUT VICRYL 2-0 27" SH

## (undated) DEVICE — GOWN XL

## (undated) DEVICE — SP SHEATH

## (undated) DEVICE — Device

## (undated) DEVICE — XI STAPLER SUREFORM 60

## (undated) DEVICE — DRAPE INSTRUMENT POUCH 10" X 18"

## (undated) DEVICE — LIGASURE IMPACT

## (undated) DEVICE — TUBING TUR 2 PRONG

## (undated) DEVICE — SUT VLOC 180 2-0 6" GS-22 GREEN

## (undated) DEVICE — XI SEAL UNIV 5- 8 MM

## (undated) DEVICE — DRSG DERMABOND 0.7ML

## (undated) DEVICE — DRAINAGE BAG URINARY 2L

## (undated) DEVICE — XI OBTURATOR OPTICAL BLADELESS 8MM

## (undated) DEVICE — SUT VLOC 180 3-0 6" V-20 GREEN

## (undated) DEVICE — TROCAR COVIDIEN VERSAPORT BLADELESS OPTICAL 12MM STANDARD

## (undated) DEVICE — DRSG MASTISOL

## (undated) DEVICE — FOLEY CATH 2-WAY 22FR 5CC LATEX COUNCIL RED

## (undated) DEVICE — ELCTR PLASMA BUTTON 24FR 12-30 DEG

## (undated) DEVICE — TUBING RANGER FLUID IRRIGATION SET DISP

## (undated) DEVICE — FOLEY CATH 2-WAY 18FR 5CC LATEX HYDROGEL

## (undated) DEVICE — SP KIT LAP ENTRYGUIDE STND L100X25MM

## (undated) DEVICE — OSTOMY UROSTOMY POUCH 2-PIECE ULTRA CLEAR GREEN 1.75"

## (undated) DEVICE — XI ENDOWRIST 12 - 8 MM CANNULA REDUCER

## (undated) DEVICE — LAP PAD 4 X 18"

## (undated) DEVICE — TIP METZENBAUM SCISSOR MONOPOLAR ENDOCUT (ORANGE)

## (undated) DEVICE — SP MARYLAND BIPOLAR FORCEP 6MM

## (undated) DEVICE — PREP BETADINE SPONGE STICKS

## (undated) DEVICE — PACK CYSTO

## (undated) DEVICE — FOLEY CATH 2-WAY 18FR 30CC LATEX HYDROGEL

## (undated) DEVICE — TROCAR SURGIQUEST AIRSEAL 12MMX100MM

## (undated) DEVICE — APPLICATOR SURGICEL LAP TROCAR POINT 2.5MM X 150MM

## (undated) DEVICE — TUBING AIRSEAL TRI-LUMEN FILTERED

## (undated) DEVICE — DRAPE C ARM 41X74"

## (undated) DEVICE — ENDOCATCH II 15MM

## (undated) DEVICE — D HELP - CLEARVIEW CLEARIFY SYSTEM

## (undated) DEVICE — TROCAR SURGIQUEST AIRSEAL 8MMX100MM

## (undated) DEVICE — FOLEY TRAY 16FR 5CC LF UMETER CLOSED

## (undated) DEVICE — FOLEY HOLDER STATLOCK 2 WAY ADULT

## (undated) DEVICE — PACK GENERAL LAPAROSCOPY

## (undated) DEVICE — TIP SCISSOR MCS TIP 10/PK

## (undated) DEVICE — SUT VICRYL 0 27" UR-6

## (undated) DEVICE — GLV 7.5 PROTEXIS (WHITE)

## (undated) DEVICE — SUT SILK 3-0 18" SH (POP-OFF)

## (undated) DEVICE — GOWN ROYAL SILK XL

## (undated) DEVICE — SUT VICRYL 0 54" TIES

## (undated) DEVICE — XI DRAPE ARM

## (undated) DEVICE — SP CLIP APPLIER MEDIUM-LARGE 6MM

## (undated) DEVICE — XI TIP COVER

## (undated) DEVICE — SUT MONOCRYL 4-0 27" PS-2 UNDYED

## (undated) DEVICE — ELCTR BOVIE PENCIL BLADE 10FT

## (undated) DEVICE — OSTOMY WAFER FLAT CERAPLUS 1.75"

## (undated) DEVICE — SUT PROLENE 4-0 36" RB-1

## (undated) DEVICE — FOLEY CATH 2-WAY 20FR 5CC UNCOATED SILICONE

## (undated) DEVICE — DRAINAGE BAG URINARY 4L

## (undated) DEVICE — SP COVER CAMERA SHEATH

## (undated) DEVICE — SUT CLIP LAPRA-TY ABSORBABLE SIZE 0.118 TO 0.12" VIOLET

## (undated) DEVICE — TROCAR GELPOINT MINI ADVANCED

## (undated) DEVICE — SP BIPOLAR FORCEP FENSTRATED 6MM

## (undated) DEVICE — VENODYNE/SCD SLEEVE CALF MEDIUM

## (undated) DEVICE — ENDOCATCH 10MM SPECIMEN POUCH

## (undated) DEVICE — XI 12MM AND STAPLER CANNULA SEAL

## (undated) DEVICE — XI DRAPE COLUMN

## (undated) DEVICE — SP DRAPE ARM

## (undated) DEVICE — SUT SILK 2-0 30" SH

## (undated) DEVICE — INZII RETRIEVAL SYSTEM 12/15MM

## (undated) DEVICE — FOLEY CATH 3-WAY 24FR 30CC SOFT SIMPLASTIC

## (undated) DEVICE — SOL IRR BAG NS 0.9% 3000ML

## (undated) DEVICE — XI VESSEL SEALER

## (undated) DEVICE — SP MONOPOLAR SCISSOR CURVED 6MM

## (undated) DEVICE — SYR CATH TIP 2 OZ

## (undated) DEVICE — DRAIN RESERVOIR FOR JACKSON PRATT 100CC CARDINAL

## (undated) DEVICE — DRAIN JACKSON PRATT 10MM FLAT 3/4 NO TROCAR

## (undated) DEVICE — POSITIONER FOAM EGG CRATE ULNAR 2PCS (PINK)

## (undated) DEVICE — TROCAR COVIDIEN VERSAPORT BLADELESS OPTICAL 5MM STANDARD

## (undated) DEVICE — SUT VICRYL 4-0 27" RB-1 UNDYED

## (undated) DEVICE — PACK ROBOTIC UROLOGY

## (undated) DEVICE — DRSG TAPE UMBILICAL COTTON 2" X 30 X 1/8"

## (undated) DEVICE — SUT VLOC 180 2-0 9" GS-22 GREEN

## (undated) DEVICE — INSUFFLATION NDL COVIDIEN SURGINEEDLE VERESS 120MM

## (undated) DEVICE — SUT VICRYL 3-0 27" SH

## (undated) DEVICE — SP NEEDLE DRIVER 6MM

## (undated) DEVICE — POSITIONER PINK PAD PIGAZZI SYSTEM XL W ARM PROTECTOR

## (undated) DEVICE — TUBING ROSI REMOTE VTI

## (undated) DEVICE — WARMING BLANKET UPPER ADULT

## (undated) DEVICE — UROVAC